# Patient Record
Sex: FEMALE | Race: WHITE | NOT HISPANIC OR LATINO | Employment: OTHER | URBAN - METROPOLITAN AREA
[De-identification: names, ages, dates, MRNs, and addresses within clinical notes are randomized per-mention and may not be internally consistent; named-entity substitution may affect disease eponyms.]

---

## 2022-01-27 ENCOUNTER — HOSPITAL ENCOUNTER (OUTPATIENT)
Facility: HOSPITAL | Age: 67
Setting detail: OBSERVATION
Discharge: HOME | End: 2022-01-29
Attending: EMERGENCY MEDICINE | Admitting: HOSPITALIST

## 2022-01-27 ENCOUNTER — OFFICE VISIT (OUTPATIENT)
Dept: FAMILY MEDICINE CLINIC | Facility: CLINIC | Age: 67
End: 2022-01-27

## 2022-01-27 VITALS
BODY MASS INDEX: 31.39 KG/M2 | TEMPERATURE: 97.3 F | DIASTOLIC BLOOD PRESSURE: 64 MMHG | HEIGHT: 67 IN | SYSTOLIC BLOOD PRESSURE: 86 MMHG | OXYGEN SATURATION: 99 % | HEART RATE: 92 BPM

## 2022-01-27 DIAGNOSIS — I95.9 HYPOTENSION, UNSPECIFIED HYPOTENSION TYPE: Primary | ICD-10-CM

## 2022-01-27 DIAGNOSIS — E83.51 HYPOCALCEMIA: ICD-10-CM

## 2022-01-27 DIAGNOSIS — I26.94 MULTIPLE SUBSEGMENTAL PULMONARY EMBOLI WITHOUT ACUTE COR PULMONALE: ICD-10-CM

## 2022-01-27 DIAGNOSIS — R53.83 FATIGUE, UNSPECIFIED TYPE: ICD-10-CM

## 2022-01-27 DIAGNOSIS — E87.6 HYPOKALEMIA: Primary | ICD-10-CM

## 2022-01-27 DIAGNOSIS — R73.9 ELEVATED BLOOD SUGAR: ICD-10-CM

## 2022-01-27 PROBLEM — Z91.89 HISTORY OF ANXIETY STATE: Status: ACTIVE | Noted: 2019-04-29

## 2022-01-27 PROBLEM — E66.9 OBESITY (BMI 30-39.9): Status: ACTIVE | Noted: 2022-01-27

## 2022-01-27 PROBLEM — E66.3 OVERWEIGHT WITH BODY MASS INDEX (BMI) 25.0-29.9: Status: ACTIVE | Noted: 2019-06-27

## 2022-01-27 PROBLEM — J45.909 ASTHMA: Status: ACTIVE | Noted: 2022-01-27

## 2022-01-27 PROBLEM — F03.90 DEMENTIA: Status: ACTIVE | Noted: 2020-08-20

## 2022-01-27 PROBLEM — F41.8 MIXED ANXIETY AND DEPRESSIVE DISORDER: Status: ACTIVE | Noted: 2018-12-03

## 2022-01-27 PROBLEM — R07.9 CHEST PAIN: Status: ACTIVE | Noted: 2022-01-27

## 2022-01-27 PROBLEM — Z86.711 HISTORY OF PULMONARY EMBOLISM: Status: ACTIVE | Noted: 2022-01-27

## 2022-01-27 PROBLEM — Z66 DNR (DO NOT RESUSCITATE): Status: ACTIVE | Noted: 2022-01-27

## 2022-01-27 PROBLEM — R41.3 MEMORY LOSS: Status: ACTIVE | Noted: 2019-04-25

## 2022-01-27 PROBLEM — Z86.59 HISTORY OF ANXIETY STATE: Status: ACTIVE | Noted: 2019-04-29

## 2022-01-27 LAB
ALBUMIN SERPL-MCNC: 1.6 G/DL (ref 3.5–5.2)
ALBUMIN/GLOB SERPL: 0.8 G/DL
ALP SERPL-CCNC: 65 U/L (ref 39–117)
ALT SERPL W P-5'-P-CCNC: 43 U/L (ref 1–33)
ANION GAP SERPL CALCULATED.3IONS-SCNC: 8.2 MMOL/L (ref 5–15)
AST SERPL-CCNC: 46 U/L (ref 1–32)
BACTERIA UR QL AUTO: ABNORMAL /HPF
BASOPHILS # BLD AUTO: 0.05 10*3/MM3 (ref 0–0.2)
BASOPHILS NFR BLD AUTO: 0.5 % (ref 0–1.5)
BILIRUB SERPL-MCNC: 0.3 MG/DL (ref 0–1.2)
BILIRUB UR QL STRIP: NEGATIVE
BUN SERPL-MCNC: 14 MG/DL (ref 8–23)
BUN/CREAT SERPL: 21.2 (ref 7–25)
CALCIUM SPEC-SCNC: 5.1 MG/DL (ref 8.6–10.5)
CHLORIDE SERPL-SCNC: 114 MMOL/L (ref 98–107)
CLARITY UR: ABNORMAL
CO2 SERPL-SCNC: 17.8 MMOL/L (ref 22–29)
COLOR UR: ABNORMAL
CREAT SERPL-MCNC: 0.66 MG/DL (ref 0.57–1)
DEPRECATED RDW RBC AUTO: 39.1 FL (ref 37–54)
EOSINOPHIL # BLD AUTO: 0.37 10*3/MM3 (ref 0–0.4)
EOSINOPHIL NFR BLD AUTO: 3.5 % (ref 0.3–6.2)
ERYTHROCYTE [DISTWIDTH] IN BLOOD BY AUTOMATED COUNT: 12.1 % (ref 12.3–15.4)
GFR SERPL CREATININE-BSD FRML MDRD: 90 ML/MIN/1.73
GLOBULIN UR ELPH-MCNC: 2 GM/DL
GLUCOSE BLDC GLUCOMTR-MCNC: 161 MG/DL (ref 70–130)
GLUCOSE SERPL-MCNC: 75 MG/DL (ref 65–99)
GLUCOSE UR STRIP-MCNC: NEGATIVE MG/DL
HCT VFR BLD AUTO: 30 % (ref 34–46.6)
HGB BLD-MCNC: 10.5 G/DL (ref 12–15.9)
HGB UR QL STRIP.AUTO: NEGATIVE
HYALINE CASTS UR QL AUTO: ABNORMAL /LPF
IMM GRANULOCYTES # BLD AUTO: 0.3 10*3/MM3 (ref 0–0.05)
IMM GRANULOCYTES NFR BLD AUTO: 2.9 % (ref 0–0.5)
KETONES UR QL STRIP: ABNORMAL
LEUKOCYTE ESTERASE UR QL STRIP.AUTO: ABNORMAL
LYMPHOCYTES # BLD AUTO: 1.01 10*3/MM3 (ref 0.7–3.1)
LYMPHOCYTES NFR BLD AUTO: 9.7 % (ref 19.6–45.3)
MCH RBC QN AUTO: 31.2 PG (ref 26.6–33)
MCHC RBC AUTO-ENTMCNC: 35 G/DL (ref 31.5–35.7)
MCV RBC AUTO: 89 FL (ref 79–97)
MONOCYTES # BLD AUTO: 0.97 10*3/MM3 (ref 0.1–0.9)
MONOCYTES NFR BLD AUTO: 9.3 % (ref 5–12)
NEUTROPHILS NFR BLD AUTO: 7.76 10*3/MM3 (ref 1.7–7)
NEUTROPHILS NFR BLD AUTO: 74.1 % (ref 42.7–76)
NITRITE UR QL STRIP: NEGATIVE
NRBC BLD AUTO-RTO: 0 /100 WBC (ref 0–0.2)
PH UR STRIP.AUTO: <=5 [PH] (ref 5–8)
PLATELET # BLD AUTO: 391 10*3/MM3 (ref 140–450)
PMV BLD AUTO: 10.3 FL (ref 6–12)
POTASSIUM SERPL-SCNC: 2.2 MMOL/L (ref 3.5–5.2)
PROT SERPL-MCNC: 3.6 G/DL (ref 6–8.5)
PROT UR QL STRIP: ABNORMAL
QT INTERVAL: 424 MS
RBC # BLD AUTO: 3.37 10*6/MM3 (ref 3.77–5.28)
RBC # UR STRIP: ABNORMAL /HPF
REF LAB TEST METHOD: ABNORMAL
SODIUM SERPL-SCNC: 140 MMOL/L (ref 136–145)
SP GR UR STRIP: 1.02 (ref 1–1.03)
SQUAMOUS #/AREA URNS HPF: ABNORMAL /HPF
TROPONIN T SERPL-MCNC: <0.01 NG/ML (ref 0–0.03)
UROBILINOGEN UR QL STRIP: ABNORMAL
WBC # UR STRIP: ABNORMAL /HPF
WBC NRBC COR # BLD: 10.46 10*3/MM3 (ref 3.4–10.8)

## 2022-01-27 PROCEDURE — 82962 GLUCOSE BLOOD TEST: CPT | Performed by: NURSE PRACTITIONER

## 2022-01-27 PROCEDURE — 93005 ELECTROCARDIOGRAM TRACING: CPT | Performed by: EMERGENCY MEDICINE

## 2022-01-27 PROCEDURE — U0004 COV-19 TEST NON-CDC HGH THRU: HCPCS | Performed by: EMERGENCY MEDICINE

## 2022-01-27 PROCEDURE — G0378 HOSPITAL OBSERVATION PER HR: HCPCS

## 2022-01-27 PROCEDURE — 99285 EMERGENCY DEPT VISIT HI MDM: CPT

## 2022-01-27 PROCEDURE — 99203 OFFICE O/P NEW LOW 30 MIN: CPT | Performed by: NURSE PRACTITIONER

## 2022-01-27 PROCEDURE — 84484 ASSAY OF TROPONIN QUANT: CPT | Performed by: EMERGENCY MEDICINE

## 2022-01-27 PROCEDURE — C9803 HOPD COVID-19 SPEC COLLECT: HCPCS

## 2022-01-27 PROCEDURE — 96365 THER/PROPH/DIAG IV INF INIT: CPT

## 2022-01-27 PROCEDURE — 93010 ELECTROCARDIOGRAM REPORT: CPT | Performed by: INTERNAL MEDICINE

## 2022-01-27 PROCEDURE — 25010000002 CALCIUM GLUCONATE-NACL 1-0.675 GM/50ML-% SOLUTION: Performed by: EMERGENCY MEDICINE

## 2022-01-27 PROCEDURE — 81001 URINALYSIS AUTO W/SCOPE: CPT | Performed by: EMERGENCY MEDICINE

## 2022-01-27 PROCEDURE — 80053 COMPREHEN METABOLIC PANEL: CPT | Performed by: EMERGENCY MEDICINE

## 2022-01-27 PROCEDURE — 85025 COMPLETE CBC W/AUTO DIFF WBC: CPT | Performed by: EMERGENCY MEDICINE

## 2022-01-27 RX ORDER — ACETAMINOPHEN 650 MG/1
650 SUPPOSITORY RECTAL EVERY 4 HOURS PRN
Status: DISCONTINUED | OUTPATIENT
Start: 2022-01-27 | End: 2022-01-29 | Stop reason: HOSPADM

## 2022-01-27 RX ORDER — ONDANSETRON 4 MG/1
4 TABLET, FILM COATED ORAL EVERY 6 HOURS PRN
Status: DISCONTINUED | OUTPATIENT
Start: 2022-01-27 | End: 2022-01-29 | Stop reason: HOSPADM

## 2022-01-27 RX ORDER — MEMANTINE HYDROCHLORIDE 10 MG/1
10 TABLET ORAL 2 TIMES DAILY
COMMUNITY
Start: 2022-01-24 | End: 2022-05-03 | Stop reason: SDUPTHER

## 2022-01-27 RX ORDER — BENZONATATE 100 MG/1
200 CAPSULE ORAL 3 TIMES DAILY PRN
COMMUNITY
Start: 2022-01-20 | End: 2022-02-07

## 2022-01-27 RX ORDER — SODIUM CHLORIDE 0.9 % (FLUSH) 0.9 %
10 SYRINGE (ML) INJECTION AS NEEDED
Status: DISCONTINUED | OUTPATIENT
Start: 2022-01-27 | End: 2022-01-29 | Stop reason: HOSPADM

## 2022-01-27 RX ORDER — MAGNESIUM SULFATE HEPTAHYDRATE 40 MG/ML
2 INJECTION, SOLUTION INTRAVENOUS AS NEEDED
Status: DISCONTINUED | OUTPATIENT
Start: 2022-01-27 | End: 2022-01-29 | Stop reason: HOSPADM

## 2022-01-27 RX ORDER — POTASSIUM CHLORIDE 1.5 G/1.77G
40 POWDER, FOR SOLUTION ORAL AS NEEDED
Status: DISCONTINUED | OUTPATIENT
Start: 2022-01-27 | End: 2022-01-29 | Stop reason: HOSPADM

## 2022-01-27 RX ORDER — DONEPEZIL HYDROCHLORIDE 10 MG/1
10 TABLET, FILM COATED ORAL NIGHTLY
COMMUNITY
Start: 2021-12-27

## 2022-01-27 RX ORDER — MAGNESIUM SULFATE HEPTAHYDRATE 40 MG/ML
4 INJECTION, SOLUTION INTRAVENOUS AS NEEDED
Status: DISCONTINUED | OUTPATIENT
Start: 2022-01-27 | End: 2022-01-29 | Stop reason: HOSPADM

## 2022-01-27 RX ORDER — NITROGLYCERIN 0.4 MG/1
0.4 TABLET SUBLINGUAL
Status: DISCONTINUED | OUTPATIENT
Start: 2022-01-27 | End: 2022-01-29 | Stop reason: HOSPADM

## 2022-01-27 RX ORDER — SODIUM CHLORIDE 0.9 % (FLUSH) 0.9 %
10 SYRINGE (ML) INJECTION EVERY 12 HOURS SCHEDULED
Status: DISCONTINUED | OUTPATIENT
Start: 2022-01-27 | End: 2022-01-29 | Stop reason: HOSPADM

## 2022-01-27 RX ORDER — POTASSIUM CHLORIDE 7.45 MG/ML
10 INJECTION INTRAVENOUS
Status: DISCONTINUED | OUTPATIENT
Start: 2022-01-27 | End: 2022-01-29 | Stop reason: HOSPADM

## 2022-01-27 RX ORDER — ONDANSETRON 2 MG/ML
4 INJECTION INTRAMUSCULAR; INTRAVENOUS EVERY 6 HOURS PRN
Status: DISCONTINUED | OUTPATIENT
Start: 2022-01-27 | End: 2022-01-29 | Stop reason: HOSPADM

## 2022-01-27 RX ORDER — ACETAMINOPHEN 325 MG/1
650 TABLET ORAL EVERY 4 HOURS PRN
Status: DISCONTINUED | OUTPATIENT
Start: 2022-01-27 | End: 2022-01-29 | Stop reason: HOSPADM

## 2022-01-27 RX ORDER — ACETAMINOPHEN 160 MG/5ML
650 SOLUTION ORAL EVERY 4 HOURS PRN
Status: DISCONTINUED | OUTPATIENT
Start: 2022-01-27 | End: 2022-01-29 | Stop reason: HOSPADM

## 2022-01-27 RX ORDER — FUROSEMIDE 40 MG/1
40 TABLET ORAL DAILY PRN
COMMUNITY
Start: 2021-12-27 | End: 2022-01-29 | Stop reason: HOSPADM

## 2022-01-27 RX ORDER — FENOFIBRATE 160 MG/1
1 TABLET ORAL DAILY
COMMUNITY

## 2022-01-27 RX ORDER — POTASSIUM CHLORIDE 750 MG/1
40 TABLET, FILM COATED, EXTENDED RELEASE ORAL AS NEEDED
Status: DISCONTINUED | OUTPATIENT
Start: 2022-01-27 | End: 2022-01-29 | Stop reason: HOSPADM

## 2022-01-27 RX ORDER — CALCIUM GLUCONATE 20 MG/ML
1 INJECTION, SOLUTION INTRAVENOUS AS NEEDED
Status: DISCONTINUED | OUTPATIENT
Start: 2022-01-27 | End: 2022-01-29 | Stop reason: HOSPADM

## 2022-01-27 RX ORDER — GUAIFENESIN 600 MG/1
1200 TABLET, EXTENDED RELEASE ORAL
COMMUNITY
Start: 2022-01-20 | End: 2022-02-07

## 2022-01-27 RX ADMIN — POTASSIUM CHLORIDE 40 MEQ: 750 TABLET, EXTENDED RELEASE ORAL at 23:53

## 2022-01-27 RX ADMIN — POTASSIUM CHLORIDE 40 MEQ: 750 TABLET, EXTENDED RELEASE ORAL at 18:46

## 2022-01-27 RX ADMIN — CALCIUM GLUCONATE 1 G: 20 INJECTION, SOLUTION INTRAVENOUS at 18:50

## 2022-01-27 RX ADMIN — SODIUM CHLORIDE 500 ML: 9 INJECTION, SOLUTION INTRAVENOUS at 16:55

## 2022-01-27 RX ADMIN — SODIUM CHLORIDE, PRESERVATIVE FREE 10 ML: 5 INJECTION INTRAVENOUS at 23:54

## 2022-01-27 RX ADMIN — APIXABAN 5 MG: 5 TABLET, FILM COATED ORAL at 23:53

## 2022-01-27 NOTE — PROGRESS NOTES
"Chief Complaint  Establish Care and Fatigue    Subjective          Erin Jones presents to [facility]  History of Present Illness  This is a 66-year-old female patient here today to establish care.  Patient was recently hospitalized for bilateral pulmonary emboli and DVT of the right popliteal vein.  She was started on Eliquis and has an appointment with Dr. Simmons in [place] next week.  Her sister also suffers from blood clot . Echo showed EF of 55 to 60%. She will follow up with Dr Menezes. She has a history of asthma and dementia. She is on aricept and namenda. She is currently being followed by  neurology. Her  is with her today and states since returning home from hospital she is become more lethargic each day.  She does have a mild cough.  She denies any chest pain or shortness of breath.  She is very weak and lightheaded.  Patient's blood pressure today is 86/64.    Objective   Vital Signs:   BP (!) 86/64   Pulse 92   Temp 97.3 °F (36.3 °C)   Ht 170 cm (66.93\")   SpO2 99%   BMI 31.39 kg/m²     Physical Exam  Vitals and nursing note reviewed.   HENT:      Head: Normocephalic.      Nose: Nose normal.   Eyes:      Pupils: Pupils are equal, round, and reactive to light.   Cardiovascular:      Rate and Rhythm: Normal rate and regular rhythm.      Pulses: Normal pulses.      Heart sounds: Normal heart sounds.   Pulmonary:      Effort: Pulmonary effort is normal. No respiratory distress.      Breath sounds: Normal breath sounds. No wheezing or rales.   Abdominal:      General: Bowel sounds are normal. There is no distension.      Tenderness: There is no abdominal tenderness.   Musculoskeletal:         General: No swelling.      Cervical back: Neck supple.      Right lower leg: No edema.      Left lower leg: No edema.   Skin:     General: Skin is warm and dry.   Neurological:      Mental Status: She is alert and oriented to person, place, and time.   Psychiatric:    "      Mood and Affect: Mood normal.        Result Review :                 Assessment and Plan    Diagnoses and all orders for this visit:    1. Hypotension, unspecified hypotension type (Primary)    2. Fatigue, unspecified type  -     POCT Glucose    3. Elevated blood sugar  -     POCT Glucose    4. Multiple subsegmental pulmonary emboli without acute cor pulmonale (HCC)    Due to patient's presentation and blood pressure I will send her to ER for further evaluation.  Patient's  verbalizes understanding and will have her return once more stable at home      Follow Up   No follow-ups on file.  Patient was given instructions and counseling regarding her condition or for health maintenance advice. Please see specific information pulled into the AVS if appropriate.

## 2022-01-28 LAB
ANION GAP SERPL CALCULATED.3IONS-SCNC: 8.9 MMOL/L (ref 5–15)
BASOPHILS # BLD AUTO: 0.11 10*3/MM3 (ref 0–0.2)
BASOPHILS NFR BLD AUTO: 1 % (ref 0–1.5)
BUN SERPL-MCNC: 16 MG/DL (ref 8–23)
BUN/CREAT SERPL: 13.1 (ref 7–25)
CALCIUM SPEC-SCNC: 10 MG/DL (ref 8.6–10.5)
CALCIUM SPEC-SCNC: 9 MG/DL (ref 8.6–10.5)
CHLORIDE SERPL-SCNC: 101 MMOL/L (ref 98–107)
CO2 SERPL-SCNC: 25.1 MMOL/L (ref 22–29)
CREAT SERPL-MCNC: 1.22 MG/DL (ref 0.57–1)
DEPRECATED RDW RBC AUTO: 39.1 FL (ref 37–54)
EOSINOPHIL # BLD AUTO: 0.88 10*3/MM3 (ref 0–0.4)
EOSINOPHIL NFR BLD AUTO: 7.7 % (ref 0.3–6.2)
ERYTHROCYTE [DISTWIDTH] IN BLOOD BY AUTOMATED COUNT: 12.3 % (ref 12.3–15.4)
GFR SERPL CREATININE-BSD FRML MDRD: 44 ML/MIN/1.73
GLUCOSE SERPL-MCNC: 99 MG/DL (ref 65–99)
HCT VFR BLD AUTO: 35 % (ref 34–46.6)
HGB BLD-MCNC: 12.3 G/DL (ref 12–15.9)
IMM GRANULOCYTES # BLD AUTO: 0.4 10*3/MM3 (ref 0–0.05)
IMM GRANULOCYTES NFR BLD AUTO: 3.5 % (ref 0–0.5)
LYMPHOCYTES # BLD AUTO: 1.8 10*3/MM3 (ref 0.7–3.1)
LYMPHOCYTES NFR BLD AUTO: 15.8 % (ref 19.6–45.3)
MAGNESIUM SERPL-MCNC: 2.4 MG/DL (ref 1.6–2.4)
MCH RBC QN AUTO: 31 PG (ref 26.6–33)
MCHC RBC AUTO-ENTMCNC: 35.1 G/DL (ref 31.5–35.7)
MCV RBC AUTO: 88.2 FL (ref 79–97)
MONOCYTES # BLD AUTO: 1.05 10*3/MM3 (ref 0.1–0.9)
MONOCYTES NFR BLD AUTO: 9.2 % (ref 5–12)
NEUTROPHILS NFR BLD AUTO: 62.8 % (ref 42.7–76)
NEUTROPHILS NFR BLD AUTO: 7.15 10*3/MM3 (ref 1.7–7)
NRBC BLD AUTO-RTO: 0.1 /100 WBC (ref 0–0.2)
PLATELET # BLD AUTO: 440 10*3/MM3 (ref 140–450)
PMV BLD AUTO: 10.6 FL (ref 6–12)
POTASSIUM SERPL-SCNC: 5.2 MMOL/L (ref 3.5–5.2)
RBC # BLD AUTO: 3.97 10*6/MM3 (ref 3.77–5.28)
SARS-COV-2 ORF1AB RESP QL NAA+PROBE: NOT DETECTED
SODIUM SERPL-SCNC: 135 MMOL/L (ref 136–145)
WBC NRBC COR # BLD: 11.39 10*3/MM3 (ref 3.4–10.8)

## 2022-01-28 PROCEDURE — 83735 ASSAY OF MAGNESIUM: CPT | Performed by: EMERGENCY MEDICINE

## 2022-01-28 PROCEDURE — G0378 HOSPITAL OBSERVATION PER HR: HCPCS

## 2022-01-28 PROCEDURE — 85025 COMPLETE CBC W/AUTO DIFF WBC: CPT | Performed by: INTERNAL MEDICINE

## 2022-01-28 PROCEDURE — 97110 THERAPEUTIC EXERCISES: CPT

## 2022-01-28 PROCEDURE — 80048 BASIC METABOLIC PNL TOTAL CA: CPT | Performed by: INTERNAL MEDICINE

## 2022-01-28 PROCEDURE — 25010000002 CALCIUM GLUCONATE PER 10 ML: Performed by: EMERGENCY MEDICINE

## 2022-01-28 PROCEDURE — 36415 COLL VENOUS BLD VENIPUNCTURE: CPT | Performed by: INTERNAL MEDICINE

## 2022-01-28 PROCEDURE — 82310 ASSAY OF CALCIUM: CPT | Performed by: HOSPITALIST

## 2022-01-28 PROCEDURE — 97161 PT EVAL LOW COMPLEX 20 MIN: CPT

## 2022-01-28 RX ADMIN — APIXABAN 5 MG: 5 TABLET, FILM COATED ORAL at 09:08

## 2022-01-28 RX ADMIN — APIXABAN 5 MG: 5 TABLET, FILM COATED ORAL at 20:19

## 2022-01-28 RX ADMIN — CALCIUM GLUCONATE 6 G: 98 INJECTION, SOLUTION INTRAVENOUS at 06:24

## 2022-01-28 RX ADMIN — SODIUM CHLORIDE, PRESERVATIVE FREE 10 ML: 5 INJECTION INTRAVENOUS at 09:09

## 2022-01-28 RX ADMIN — POTASSIUM CHLORIDE 40 MEQ: 750 TABLET, EXTENDED RELEASE ORAL at 04:06

## 2022-01-28 RX ADMIN — SODIUM CHLORIDE, PRESERVATIVE FREE 10 ML: 5 INJECTION INTRAVENOUS at 20:19

## 2022-01-29 ENCOUNTER — READMISSION MANAGEMENT (OUTPATIENT)
Dept: CALL CENTER | Facility: HOSPITAL | Age: 67
End: 2022-01-29

## 2022-01-29 VITALS
BODY MASS INDEX: 30.31 KG/M2 | SYSTOLIC BLOOD PRESSURE: 122 MMHG | DIASTOLIC BLOOD PRESSURE: 76 MMHG | TEMPERATURE: 98.2 F | RESPIRATION RATE: 18 BRPM | HEART RATE: 76 BPM | OXYGEN SATURATION: 99 % | HEIGHT: 66 IN | WEIGHT: 188.6 LBS

## 2022-01-29 LAB
ALBUMIN SERPL-MCNC: 2.7 G/DL (ref 3.5–5.2)
ALBUMIN/GLOB SERPL: 0.9 G/DL
ALP SERPL-CCNC: 106 U/L (ref 39–117)
ALT SERPL W P-5'-P-CCNC: 93 U/L (ref 1–33)
ANION GAP SERPL CALCULATED.3IONS-SCNC: 9.4 MMOL/L (ref 5–15)
AST SERPL-CCNC: 75 U/L (ref 1–32)
BILIRUB SERPL-MCNC: 0.4 MG/DL (ref 0–1.2)
BUN SERPL-MCNC: 14 MG/DL (ref 8–23)
BUN/CREAT SERPL: 13.5 (ref 7–25)
CA-I BLD-MCNC: 5.2 MG/DL (ref 4.6–5.4)
CA-I SERPL ISE-MCNC: 1.31 MMOL/L (ref 1.15–1.35)
CALCIUM SPEC-SCNC: 8.8 MG/DL (ref 8.6–10.5)
CHLORIDE SERPL-SCNC: 100 MMOL/L (ref 98–107)
CO2 SERPL-SCNC: 26.6 MMOL/L (ref 22–29)
CREAT SERPL-MCNC: 1.04 MG/DL (ref 0.57–1)
GFR SERPL CREATININE-BSD FRML MDRD: 53 ML/MIN/1.73
GLOBULIN UR ELPH-MCNC: 3 GM/DL
GLUCOSE SERPL-MCNC: 98 MG/DL (ref 65–99)
MAGNESIUM SERPL-MCNC: 2.2 MG/DL (ref 1.6–2.4)
PHOSPHATE SERPL-MCNC: 3.5 MG/DL (ref 2.5–4.5)
POTASSIUM SERPL-SCNC: 4.3 MMOL/L (ref 3.5–5.2)
PREALB SERPL-MCNC: 7.2 MG/DL (ref 20–40)
PROT SERPL-MCNC: 5.7 G/DL (ref 6–8.5)
SODIUM SERPL-SCNC: 136 MMOL/L (ref 136–145)
TSH SERPL DL<=0.05 MIU/L-ACNC: 2.91 UIU/ML (ref 0.27–4.2)

## 2022-01-29 PROCEDURE — 84443 ASSAY THYROID STIM HORMONE: CPT | Performed by: HOSPITALIST

## 2022-01-29 PROCEDURE — 90686 IIV4 VACC NO PRSV 0.5 ML IM: CPT | Performed by: INTERNAL MEDICINE

## 2022-01-29 PROCEDURE — 84100 ASSAY OF PHOSPHORUS: CPT | Performed by: HOSPITALIST

## 2022-01-29 PROCEDURE — 25010000002 INFLUENZA VAC SPLIT QUAD 0.5 ML SUSPENSION PREFILLED SYRINGE: Performed by: INTERNAL MEDICINE

## 2022-01-29 PROCEDURE — 84134 ASSAY OF PREALBUMIN: CPT | Performed by: HOSPITALIST

## 2022-01-29 PROCEDURE — G0008 ADMIN INFLUENZA VIRUS VAC: HCPCS | Performed by: INTERNAL MEDICINE

## 2022-01-29 PROCEDURE — G0378 HOSPITAL OBSERVATION PER HR: HCPCS

## 2022-01-29 PROCEDURE — 83735 ASSAY OF MAGNESIUM: CPT | Performed by: HOSPITALIST

## 2022-01-29 PROCEDURE — 80053 COMPREHEN METABOLIC PANEL: CPT | Performed by: HOSPITALIST

## 2022-01-29 PROCEDURE — 82330 ASSAY OF CALCIUM: CPT | Performed by: HOSPITALIST

## 2022-01-29 RX ADMIN — INFLUENZA VIRUS VACCINE 0.5 ML: 15; 15; 15; 15 SUSPENSION INTRAMUSCULAR at 15:17

## 2022-01-29 RX ADMIN — APIXABAN 5 MG: 5 TABLET, FILM COATED ORAL at 08:43

## 2022-01-29 NOTE — OUTREACH NOTE
Prep Survey      Responses   [facility] facility patient discharged from? [place]   Is LACE score < 7 ? No   Emergency Room discharge w/ pulse ox? No   Eligibility [facility]   Date of Admission 01/27/22   Date of Discharge 01/29/22   Discharge Disposition Home or Self Care   Discharge diagnosis hypokalemia   Does the patient have one of the following disease processes/diagnoses(primary or secondary)? Other   Does the patient have Home health ordered? No   Is there a DME ordered? No   Prep survey completed? Yes          Viviane Lowery RN

## 2022-01-31 ENCOUNTER — TELEPHONE (OUTPATIENT)
Dept: FAMILY MEDICINE CLINIC | Facility: CLINIC | Age: 67
End: 2022-01-31

## 2022-01-31 ENCOUNTER — TRANSITIONAL CARE MANAGEMENT TELEPHONE ENCOUNTER (OUTPATIENT)
Dept: CALL CENTER | Facility: HOSPITAL | Age: 67
End: 2022-01-31

## 2022-01-31 NOTE — OUTREACH NOTE
Call Center TCM Note      Responses   [facility] patient discharged from? [place]   Does the patient have one of the following disease processes/diagnoses(primary or secondary)? Other   TCM attempt successful? Yes   Call start time 1059   Call end time 1100   Discharge diagnosis hypokalemia   Person spoke with today (if not patient) and relationship Candace, daughter   Meds reviewed with patient/caregiver? Yes   Is the patient having any side effects they believe may be caused by any medication additions or changes? No   Does the patient have all medications ordered at discharge? N/A   Is the patient taking all medications as directed (includes completed medication regime)? Yes   Does the patient have a primary care provider?  Yes   Does the patient have an appointment with their PCP within 7 days of discharge? Yes   Comments regarding PCP hospital fu appt on 1/31/22 at 2:00 PM   Has the patient kept scheduled appointments due by today? N/A   Psychosocial issues? No   Did the patient receive a copy of their discharge instructions? Yes   Nursing interventions Reviewed instructions with patient   What is the patient's perception of their health status since discharge? Improving   Is the patient/caregiver able to teach back signs and symptoms related to disease process for when to call PCP? Yes   Is the patient/caregiver able to teach back signs and symptoms related to disease process for when to call 911? Yes   Is the patient/caregiver able to teach back the hierarchy of who to call/visit for symptoms/problems? PCP, Specialist, Home health nurse, Urgent Care, ED, 911 Yes   If the patient is a current smoker, are they able to teach back resources for cessation? Not a smoker   TCM call completed? Yes   Wrap up additional comments Candace, daughter, states pt is not doing well, and is at [facility] ER. Candace reports pt had an appt with hematologist this morning, but could not go r/t being lethargic.  Hematologist office advised pt to go to ER. Candace guzman they are waiting for lab results from blood draw taken at [facility] ER.           Viviana Curiel RN    1/31/2022, 11:07 EST

## 2022-02-07 ENCOUNTER — OFFICE VISIT (OUTPATIENT)
Dept: FAMILY MEDICINE CLINIC | Facility: CLINIC | Age: 67
End: 2022-02-07

## 2022-02-07 VITALS
TEMPERATURE: 97.5 F | HEIGHT: 66 IN | HEART RATE: 78 BPM | DIASTOLIC BLOOD PRESSURE: 58 MMHG | SYSTOLIC BLOOD PRESSURE: 98 MMHG | BODY MASS INDEX: 28.45 KG/M2 | OXYGEN SATURATION: 98 % | RESPIRATION RATE: 20 BRPM | WEIGHT: 177 LBS

## 2022-02-07 DIAGNOSIS — I26.94 MULTIPLE SUBSEGMENTAL PULMONARY EMBOLI WITHOUT ACUTE COR PULMONALE: ICD-10-CM

## 2022-02-07 DIAGNOSIS — R53.1 GENERALIZED WEAKNESS: ICD-10-CM

## 2022-02-07 DIAGNOSIS — G30.9 ALZHEIMER'S DISEASE, UNSPECIFIED (CODE): ICD-10-CM

## 2022-02-07 DIAGNOSIS — R53.83 LETHARGY: Primary | ICD-10-CM

## 2022-02-07 PROCEDURE — 99214 OFFICE O/P EST MOD 30 MIN: CPT | Performed by: NURSE PRACTITIONER

## 2022-02-07 NOTE — PROGRESS NOTES
"Chief Complaint  Hospital Follow Up Visit    Subjective          Erin Jones presents to [facility]  History of Present Illness  This is a 66-year-old female patient being seen today for an additional hospital visit.  Patient was seen by me on 1/27/2022 after being diagnosed with bilateral PEs and started on Eliquis.  She was lethargic in my office with a very low blood pressure and was sent to the hospital by ambulance.  She was found to have potassium of 2.2 and low calcium.  She received replacement and improved.  She was discharged back home.  She had an appointment with Dr. Simmons following week with signs of lethargy again and was sent to the emergency room at [facility].  CT of head was normal but CT of chest showed large right pleural effusion.  She was sent home and return the following day after holding Eliquis to do a right pleurocentesis where they removed 1.2 L of fluid.  Since returning home her  and daughter reports she has slowly become more lethargic.  She does have Alzheimer's dementia and is on Namenda and Aricept.  Those medicines were held last night due to continuing lethargy.  Family reports she is night wanting to eat.  Ms. Jones is very lethargic in my office today.  All blood work was normal other than WBC of 13.2.  Blood cultures and urine was both negative.  Flu and Covid was both negative.  Patient denies any chest pain or shortness of breath today.  She does report weakness.  She denies any nausea vomiting or abdominal pain.  She is afebrile today.       Objective   Vital Signs:   BP 98/58   Pulse 78   Temp 97.5 °F (36.4 °C)   Resp 20   Ht 167 cm (65.75\")   Wt 80.3 kg (177 lb)   SpO2 98%   BMI 28.79 kg/m²     Physical Exam  Vitals and nursing note reviewed. Exam conducted with a chaperone present.   HENT:      Head: Normocephalic.      Nose: Nose normal.   Eyes:      Pupils: Pupils are equal, round, and reactive to light. "   Cardiovascular:      Rate and Rhythm: Normal rate and regular rhythm.      Pulses: Normal pulses.      Heart sounds: Normal heart sounds.   Pulmonary:      Effort: Pulmonary effort is normal. No respiratory distress.      Breath sounds: Normal breath sounds. No wheezing or rales.   Abdominal:      General: Bowel sounds are normal. There is no distension.      Tenderness: There is no abdominal tenderness.   Musculoskeletal:         General: No swelling.      Cervical back: Neck supple.      Right lower leg: No edema.      Left lower leg: No edema.   Skin:     General: Skin is warm and dry.   Neurological:      Mental Status: She is alert. She is disoriented.   Psychiatric:         Mood and Affect: Mood normal.        Result Review :                 Assessment and Plan    Diagnoses and all orders for this visit:    1. Lethargy (Primary)    2. Multiple subsegmental pulmonary emboli without acute cor pulmonale (HCC)    3. Generalized weakness    4. Alzheimer's disease, unspecified (CODE) (HCC)         Due to patient's continuing lethargy I have asked for them to return to ER for further evaluation.  Patient continues to be weak and with so many hospitalizations in the last month I do feel like she could benefit from home health.  We have discussed this and will consider this depending on plans for evaluation.  Family would also like to get neurologist in the [facility] system so we will work on getting referral for that as well.  Follow Up   No follow-ups on file.  Patient was given instructions and counseling regarding her condition or for health maintenance advice. Please see specific information pulled into the AVS if appropriate.

## 2022-02-08 ENCOUNTER — READMISSION MANAGEMENT (OUTPATIENT)
Dept: CALL CENTER | Facility: HOSPITAL | Age: 67
End: 2022-02-08

## 2022-02-08 NOTE — OUTREACH NOTE
Medical Week 2 Survey      Responses   [facility] patient discharged from? [place]   Does the patient have one of the following disease processes/diagnoses(primary or secondary)? Other   Week 2 attempt successful? No   Unsuccessful attempts Attempt 1   Discharge diagnosis hypokalemia          Cyndy Bowman RN

## 2022-02-10 ENCOUNTER — READMISSION MANAGEMENT (OUTPATIENT)
Dept: CALL CENTER | Facility: HOSPITAL | Age: 67
End: 2022-02-10

## 2022-02-10 NOTE — OUTREACH NOTE
Medical Week 2 Survey      Responses   [facility] patient discharged from? [place]   Does the patient have one of the following disease processes/diagnoses(primary or secondary)? Other   Week 2 attempt successful? Yes   Call start time 1228   Discharge diagnosis hypokalemia   Call end time 1230   Is patient permission given to speak with other caregiver? Yes   Person spoke with today (if not patient) and relationship Candace, daughter   Meds reviewed with patient/caregiver? Yes   Is the patient having any side effects they believe may be caused by any medication additions or changes? No   Does the patient have all medications ordered at discharge? Yes   Is the patient taking all medications as directed (includes completed medication regime)? Yes   Does the patient have a primary care provider?  Yes   Does the patient have an appointment with their PCP within 7 days of discharge? Yes   Has the patient kept scheduled appointments due by today? Yes   Has home health visited the patient within 72 hours of discharge? Yes   Psychosocial issues? No   Did the patient receive a copy of their discharge instructions? Yes   Nursing interventions Reviewed instructions with patient   What is the patient's perception of their health status since discharge? Improving   Is the patient/caregiver able to teach back signs and symptoms related to disease process for when to call PCP? Yes   Is the patient/caregiver able to teach back signs and symptoms related to disease process for when to call 911? Yes   Is the patient/caregiver able to teach back the hierarchy of who to call/visit for symptoms/problems? PCP, Specialist, Home health nurse, Urgent Care, ED, 911 Yes   If the patient is a current smoker, are they able to teach back resources for cessation? Not a smoker   Week 2 Call Completed? Yes   Wrap up additional comments Daughter states she is doing a lot better, She has HH now.           Polina James RN

## 2022-02-17 ENCOUNTER — READMISSION MANAGEMENT (OUTPATIENT)
Dept: CALL CENTER | Facility: HOSPITAL | Age: 67
End: 2022-02-17

## 2022-02-17 NOTE — OUTREACH NOTE
Medical Week 3 Survey      Responses   [facility] patient discharged from? [place]   Does the patient have one of the following disease processes/diagnoses(primary or secondary)? Other   Week 3 attempt successful? Yes   Call start time 1537   Call end time 1539   Discharge diagnosis hypokalemia   Is patient permission given to speak with other caregiver? Yes   List who call center can speak with MIRELLA REYES   Person spoke with today (if not patient) and relationship MIRELLA REYES- DAUGHTER    Meds reviewed with patient/caregiver? Yes   Is the patient having any side effects they believe may be caused by any medication additions or changes? No   Does the patient have all medications ordered at discharge? N/A   Is the patient taking all medications as directed (includes completed medication regime)? Yes   Does the patient have a primary care provider?  Yes   Has the patient kept scheduled appointments due by today? Yes   Has home health visited the patient within 72 hours of discharge? N/A   Psychosocial issues? No   Did the patient receive a copy of their discharge instructions? Yes   Nursing interventions Reviewed instructions with patient   What is the patient's perception of their health status since discharge? Improving   Is the patient/caregiver able to teach back signs and symptoms related to disease process for when to call PCP? Yes   Is the patient/caregiver able to teach back signs and symptoms related to disease process for when to call 911? Yes   Is the patient/caregiver able to teach back the hierarchy of who to call/visit for symptoms/problems? PCP, Specialist, Home health nurse, Urgent Care, ED, 911 Yes   Week 3 Call Completed? Yes          Violet Nair LPN

## 2022-02-22 ENCOUNTER — TELEPHONE (OUTPATIENT)
Dept: FAMILY MEDICINE CLINIC | Facility: CLINIC | Age: 67
End: 2022-02-22

## 2022-03-08 DIAGNOSIS — R05.9 COUGH: Primary | ICD-10-CM

## 2022-03-15 RX ORDER — AZITHROMYCIN 250 MG/1
TABLET, FILM COATED ORAL
Qty: 6 TABLET | Refills: 0 | Status: SHIPPED | OUTPATIENT
Start: 2022-03-15 | End: 2022-07-12

## 2022-05-03 ENCOUNTER — OFFICE VISIT (OUTPATIENT)
Dept: NEUROLOGY | Facility: CLINIC | Age: 67
End: 2022-05-03

## 2022-05-03 ENCOUNTER — LAB (OUTPATIENT)
Dept: LAB | Facility: HOSPITAL | Age: 67
End: 2022-05-03

## 2022-05-03 VITALS
HEIGHT: 65 IN | HEART RATE: 80 BPM | SYSTOLIC BLOOD PRESSURE: 139 MMHG | WEIGHT: 193.9 LBS | BODY MASS INDEX: 32.3 KG/M2 | DIASTOLIC BLOOD PRESSURE: 78 MMHG

## 2022-05-03 DIAGNOSIS — R41.3 MEMORY LOSS: Primary | ICD-10-CM

## 2022-05-03 DIAGNOSIS — R41.3 MEMORY LOSS: ICD-10-CM

## 2022-05-03 DIAGNOSIS — E83.51 HYPOCALCEMIA: ICD-10-CM

## 2022-05-03 DIAGNOSIS — E87.6 HYPOKALEMIA: ICD-10-CM

## 2022-05-03 LAB
ALBUMIN SERPL-MCNC: 4.3 G/DL (ref 3.5–5.2)
ALBUMIN/GLOB SERPL: 1.7 G/DL
ALP SERPL-CCNC: 101 U/L (ref 39–117)
ALT SERPL W P-5'-P-CCNC: 13 U/L (ref 1–33)
ANION GAP SERPL CALCULATED.3IONS-SCNC: 10 MMOL/L (ref 5–15)
AST SERPL-CCNC: 17 U/L (ref 1–32)
BILIRUB SERPL-MCNC: 0.4 MG/DL (ref 0–1.2)
BUN SERPL-MCNC: 14 MG/DL (ref 8–23)
BUN/CREAT SERPL: 9.9 (ref 7–25)
CALCIUM SPEC-SCNC: 9.5 MG/DL (ref 8.6–10.5)
CHLORIDE SERPL-SCNC: 106 MMOL/L (ref 98–107)
CO2 SERPL-SCNC: 26 MMOL/L (ref 22–29)
CREAT SERPL-MCNC: 1.41 MG/DL (ref 0.57–1)
DEPRECATED RDW RBC AUTO: 47.9 FL (ref 37–54)
EGFRCR SERPLBLD CKD-EPI 2021: 41.2 ML/MIN/1.73
ERYTHROCYTE [DISTWIDTH] IN BLOOD BY AUTOMATED COUNT: 14.1 % (ref 12.3–15.4)
FOLATE SERPL-MCNC: 6.34 NG/ML (ref 4.78–24.2)
GLOBULIN UR ELPH-MCNC: 2.6 GM/DL
GLUCOSE SERPL-MCNC: 91 MG/DL (ref 65–99)
HCT VFR BLD AUTO: 43.4 % (ref 34–46.6)
HCYS SERPL-MCNC: 23.8 UMOL/L (ref 0–15)
HGB BLD-MCNC: 14.3 G/DL (ref 12–15.9)
MAGNESIUM SERPL-MCNC: 2.4 MG/DL (ref 1.6–2.4)
MCH RBC QN AUTO: 30.6 PG (ref 26.6–33)
MCHC RBC AUTO-ENTMCNC: 32.9 G/DL (ref 31.5–35.7)
MCV RBC AUTO: 92.7 FL (ref 79–97)
PHOSPHATE SERPL-MCNC: 4 MG/DL (ref 2.5–4.5)
PLATELET # BLD AUTO: 273 10*3/MM3 (ref 140–450)
PMV BLD AUTO: 9.6 FL (ref 6–12)
POTASSIUM SERPL-SCNC: 4.2 MMOL/L (ref 3.5–5.2)
PROT SERPL-MCNC: 6.9 G/DL (ref 6–8.5)
RBC # BLD AUTO: 4.68 10*6/MM3 (ref 3.77–5.28)
SODIUM SERPL-SCNC: 142 MMOL/L (ref 136–145)
VIT B12 BLD-MCNC: 274 PG/ML (ref 211–946)
WBC NRBC COR # BLD: 7.66 10*3/MM3 (ref 3.4–10.8)

## 2022-05-03 PROCEDURE — 83735 ASSAY OF MAGNESIUM: CPT

## 2022-05-03 PROCEDURE — 85027 COMPLETE CBC AUTOMATED: CPT

## 2022-05-03 PROCEDURE — 84100 ASSAY OF PHOSPHORUS: CPT

## 2022-05-03 PROCEDURE — 82746 ASSAY OF FOLIC ACID SERUM: CPT

## 2022-05-03 PROCEDURE — 83090 ASSAY OF HOMOCYSTEINE: CPT

## 2022-05-03 PROCEDURE — 80053 COMPREHEN METABOLIC PANEL: CPT

## 2022-05-03 PROCEDURE — 99215 OFFICE O/P EST HI 40 MIN: CPT | Performed by: NURSE PRACTITIONER

## 2022-05-03 PROCEDURE — 36415 COLL VENOUS BLD VENIPUNCTURE: CPT

## 2022-05-03 PROCEDURE — 82607 VITAMIN B-12: CPT

## 2022-05-03 RX ORDER — RIVAROXABAN 20 MG/1
20 TABLET, FILM COATED ORAL DAILY
COMMUNITY
Start: 2022-04-22 | End: 2022-06-29

## 2022-05-03 RX ORDER — MEMANTINE HYDROCHLORIDE 5 MG/1
TABLET ORAL
Qty: 120 TABLET | Refills: 3 | Status: SHIPPED | OUTPATIENT
Start: 2022-05-03

## 2022-05-03 RX ORDER — MEMANTINE HYDROCHLORIDE 5 MG/1
5 TABLET ORAL 2 TIMES DAILY
Qty: 60 TABLET | Refills: 3 | Status: SHIPPED | OUTPATIENT
Start: 2022-05-03 | End: 2022-07-12 | Stop reason: SDUPTHER

## 2022-05-03 NOTE — PROGRESS NOTES
"Chief Complaint  Memory Loss    Subjective          Erin Jones presents to [facility]  States she was diagnosed with Alzheimer's disease by [facility] neurology about 5-6 years ago.  Was admitted to [facility] in Feb for pulmonary embolism.  States she has difficulty with short term memory.  Endorses repetitiveness in conversations. Denies progression in memory over the last several years.   states memory has remained the same as when she was initially diagnosed.       Objective   Vital Signs:   /78   Pulse 80   Ht 165.1 cm (65\")   Wt 88 kg (193 lb 14.4 oz)   BMI 32.27 kg/m²     Physical Exam  HENT:      Head: Normocephalic.   Pulmonary:      Effort: Pulmonary effort is normal.   Neurological:      Mental Status: She is alert and oriented to person, place, and time.      Cranial Nerves: Cranial nerves are intact.      Sensory: Sensation is intact.      Motor: Motor function is intact.      Coordination: Coordination is intact.      Deep Tendon Reflexes: Reflexes are normal and symmetric.        Neurologic Exam     Mental Status   Oriented to person, place, and time.        Result Review :            MoCa 21/30      CT Head [place]: Normal     Assessment and Plan    Diagnoses and all orders for this visit:    1. Memory loss (Primary)  Assessment & Plan:  Will request records from Dr. Harris at [facility], as well as previous imaging.  Will restart memantine for memory.  Will order labs to further evaluate memory.  Discussed that alzheimer's disease is typically progressive and she has not had any progression per the  in the last 5 years.  I am not confident in that diagnosis at this point.  Will obtain records and will order repeat MRI brain.     Orders:  -     CBC (No Diff); Future  -     Comprehensive Metabolic Panel; Future  -     Vitamin B12 & Folate; Future  -     Homocysteine, serum; Future  -     MRI Brain With & Without Contrast; Future    Other " orders  -     memantine (NAMENDA) 5 MG tablet; 1 tab daily for 1 week, then 1 tab twice daily for 1 week, then 2 tabs in the morning and 1 tab at night for 1 week, then 2 tabs twice daily  Dispense: 120 tablet; Refill: 3  -     memantine (NAMENDA) 5 MG tablet; Take 1 tablet by mouth 2 (Two) Times a Day.  Dispense: 60 tablet; Refill: 3    I spent 60 minutes caring for Erin on this date of service. This time includes time spent by me in the following activities:preparing for the visit, reviewing tests, obtaining and/or reviewing a separately obtained history, performing a medically appropriate examination and/or evaluation , counseling and educating the patient/family/caregiver, ordering medications, tests, or procedures, documenting information in the medical record and independently interpreting results and communicating that information with the patient/family/caregiver  Follow Up   Return in about 2 months (around 7/3/2022) for memory f/u.  Patient was given instructions and counseling regarding her condition or for health maintenance advice. Please see specific information pulled into the AVS if appropriate.

## 2022-05-04 RX ORDER — FOLIC ACID 1 MG/1
1 TABLET ORAL DAILY
Qty: 30 TABLET | Refills: 3 | Status: SHIPPED | OUTPATIENT
Start: 2022-05-04 | End: 2022-07-12 | Stop reason: SDUPTHER

## 2022-05-04 RX ORDER — MAGNESIUM 200 MG
1000 TABLET ORAL DAILY
Qty: 30 EACH | Refills: 3 | Status: SHIPPED | OUTPATIENT
Start: 2022-05-04 | End: 2022-07-12 | Stop reason: SDUPTHER

## 2022-05-04 RX ORDER — MULTIVITAMIN WITH IRON
100 TABLET ORAL DAILY
Qty: 30 TABLET | Refills: 3 | Status: SHIPPED | OUTPATIENT
Start: 2022-05-04 | End: 2022-07-12 | Stop reason: SDUPTHER

## 2022-05-04 NOTE — PROGRESS NOTES
Please notify patient that homocysteine was elevated.  I am calling in B12, B6 and Folic acid supplements to help decrease homocysteine level.

## 2022-05-05 NOTE — ASSESSMENT & PLAN NOTE
Will request records from Dr. Harris at [facility], as well as previous imaging.  Will restart memantine for memory.  Will order labs to further evaluate memory.  Discussed that alzheimer's disease is typically progressive and she has not had any progression per the  in the last 5 years.  I am not confident in that diagnosis at this point.  Will obtain records and will order repeat MRI brain.

## 2022-05-25 ENCOUNTER — PRIOR AUTHORIZATION (OUTPATIENT)
Dept: NEUROLOGY | Facility: CLINIC | Age: 67
End: 2022-05-25

## 2022-06-03 ENCOUNTER — APPOINTMENT (OUTPATIENT)
Dept: MRI IMAGING | Facility: HOSPITAL | Age: 67
End: 2022-06-03

## 2022-06-03 ENCOUNTER — HOSPITAL ENCOUNTER (OUTPATIENT)
Dept: MRI IMAGING | Facility: HOSPITAL | Age: 67
Discharge: HOME | End: 2022-06-03
Admitting: NURSE PRACTITIONER

## 2022-06-03 DIAGNOSIS — R41.3 MEMORY LOSS: ICD-10-CM

## 2022-06-03 PROCEDURE — 0 GADOBENATE DIMEGLUMINE 529 MG/ML SOLUTION: Performed by: NURSE PRACTITIONER

## 2022-06-03 PROCEDURE — A9577 INJ MULTIHANCE: HCPCS | Performed by: NURSE PRACTITIONER

## 2022-06-03 PROCEDURE — 70553 MRI BRAIN STEM W/O & W/DYE: CPT

## 2022-06-03 RX ADMIN — GADOBENATE DIMEGLUMINE 17 ML: 529 INJECTION, SOLUTION INTRAVENOUS at 09:31

## 2022-06-29 RX ORDER — RIVAROXABAN 20 MG/1
TABLET, FILM COATED ORAL
Qty: 30 TABLET | Refills: 3 | Status: SHIPPED | OUTPATIENT
Start: 2022-06-29

## 2022-07-06 ENCOUNTER — OFFICE VISIT (OUTPATIENT)
Dept: FAMILY MEDICINE CLINIC | Facility: CLINIC | Age: 67
End: 2022-07-06

## 2022-07-06 VITALS
HEIGHT: 65 IN | BODY MASS INDEX: 31.89 KG/M2 | DIASTOLIC BLOOD PRESSURE: 82 MMHG | HEART RATE: 79 BPM | OXYGEN SATURATION: 98 % | WEIGHT: 191.4 LBS | TEMPERATURE: 97.4 F | SYSTOLIC BLOOD PRESSURE: 128 MMHG

## 2022-07-06 DIAGNOSIS — G30.9 ALZHEIMER'S DISEASE, UNSPECIFIED (CODE): ICD-10-CM

## 2022-07-06 DIAGNOSIS — R06.02 SHORTNESS OF BREATH: Primary | ICD-10-CM

## 2022-07-06 DIAGNOSIS — Z11.59 NEED FOR HEPATITIS C SCREENING TEST: ICD-10-CM

## 2022-07-06 DIAGNOSIS — I95.9 HYPOTENSION, UNSPECIFIED HYPOTENSION TYPE: ICD-10-CM

## 2022-07-06 DIAGNOSIS — Z13.220 SCREENING FOR HYPERLIPIDEMIA: ICD-10-CM

## 2022-07-06 DIAGNOSIS — Z13.0 SCREENING FOR DEFICIENCY ANEMIA: ICD-10-CM

## 2022-07-06 PROCEDURE — 99214 OFFICE O/P EST MOD 30 MIN: CPT | Performed by: NURSE PRACTITIONER

## 2022-07-06 NOTE — PROGRESS NOTES
"Chief Complaint  Follow-up (Patient is here to follow up regarding medications. ) and Shortness of Breath    Subjective        Erin Jones presents to [facility]  History of Present Illness  This is a 65 yo female patient here today for follow up. Patient has a history of bilateral pulmonary emboli. She is currently been evaluated by DR Simmons with hematology and remains on xareto daily. She does have history of having rt pulonary effusion where fluid was drained at [facility] and in march had rt pleural effusion. She has a hx of Alzheimers dementia and is being followed by neurology. She is here with her sister today. She reports to me that last week she felt short of breath for a couple of days and mainly on her right side. She states it then resolved and has not had any issues. She denies chest pain or shortness of breath today. No fever or chills.    Objective   Vital Signs:  /82 (BP Location: Left arm, Patient Position: Sitting, Cuff Size: Adult)   Pulse 79   Temp 97.4 °F (36.3 °C) (Infrared)   Ht 165.1 cm (65\")   Wt 86.8 kg (191 lb 6.4 oz)   SpO2 98%   BMI 31.85 kg/m²   Estimated body mass index is 31.85 kg/m² as calculated from the following:    Height as of this encounter: 165.1 cm (65\").    Weight as of this encounter: 86.8 kg (191 lb 6.4 oz).          Physical Exam  Vitals and nursing note reviewed.   HENT:      Head: Normocephalic.      Nose: Nose normal.   Eyes:      Pupils: Pupils are equal, round, and reactive to light.   Cardiovascular:      Rate and Rhythm: Normal rate and regular rhythm.      Pulses: Normal pulses.      Heart sounds: Normal heart sounds.   Pulmonary:      Effort: Pulmonary effort is normal. No respiratory distress.      Breath sounds: Normal breath sounds. No wheezing or rales.   Abdominal:      General: Bowel sounds are normal. There is no distension.      Tenderness: There is no abdominal tenderness.   Musculoskeletal:         " General: No swelling.      Cervical back: Neck supple.      Right lower leg: No edema.      Left lower leg: No edema.   Skin:     General: Skin is warm and dry.   Neurological:      Mental Status: She is alert and oriented to person, place, and time.   Psychiatric:         Mood and Affect: Mood normal.        Result Review :                Assessment and Plan   Diagnoses and all orders for this visit:    1. Shortness of breath (Primary)  -     XR Chest PA & Lateral (In Office)    2. Hypotension, unspecified hypotension type    3. Need for hepatitis C screening test  -     Hepatitis C Antibody    4. Screening for deficiency anemia  -     CBC & Differential    5. Screening for hyperlipidemia  -     Comprehensive Metabolic Panel  -     Lipid Panel    6. Alzheimer's disease, unspecified (CODE) (HCC)    Other orders  -     SCANNED - IMAGING    chest xray appears normal but will send for radiology to review  I will obtain routine labs today  She will let me know if shortness of breath returns    I spent a total of  30minutes with the patient today including face-to-face encounter, reviewing data in the system, coordination of care with the nursing staff as well as consultants, documentation and entering orders.              Needs mammo   Follow Up   No follow-ups on file.  Patient was given instructions and counseling regarding her condition or for health maintenance advice. Please see specific information pulled into the AVS if appropriate.

## 2022-07-07 LAB
ALBUMIN SERPL-MCNC: 4.8 G/DL (ref 3.8–4.8)
ALBUMIN/GLOB SERPL: 2.4 {RATIO} (ref 1.2–2.2)
ALP SERPL-CCNC: 94 IU/L (ref 44–121)
ALT SERPL-CCNC: 11 IU/L (ref 0–32)
AST SERPL-CCNC: 17 IU/L (ref 0–40)
BASOPHILS # BLD AUTO: 0.1 X10E3/UL (ref 0–0.2)
BASOPHILS NFR BLD AUTO: 1 %
BILIRUB SERPL-MCNC: 0.8 MG/DL (ref 0–1.2)
BUN SERPL-MCNC: 11 MG/DL (ref 8–27)
BUN/CREAT SERPL: 8 (ref 12–28)
CALCIUM SERPL-MCNC: 9.9 MG/DL (ref 8.7–10.3)
CHLORIDE SERPL-SCNC: 101 MMOL/L (ref 96–106)
CHOLEST SERPL-MCNC: 201 MG/DL (ref 100–199)
CO2 SERPL-SCNC: 24 MMOL/L (ref 20–29)
CREAT SERPL-MCNC: 1.44 MG/DL (ref 0.57–1)
EGFRCR SERPLBLD CKD-EPI 2021: 40 ML/MIN/1.73
EOSINOPHIL # BLD AUTO: 0.5 X10E3/UL (ref 0–0.4)
EOSINOPHIL NFR BLD AUTO: 6 %
ERYTHROCYTE [DISTWIDTH] IN BLOOD BY AUTOMATED COUNT: 13.3 % (ref 11.7–15.4)
GLOBULIN SER CALC-MCNC: 2 G/DL (ref 1.5–4.5)
GLUCOSE SERPL-MCNC: 99 MG/DL (ref 65–99)
HCT VFR BLD AUTO: 46 % (ref 34–46.6)
HCV AB S/CO SERPL IA: 0.1 S/CO RATIO (ref 0–0.9)
HDLC SERPL-MCNC: 67 MG/DL
HGB BLD-MCNC: 15.6 G/DL (ref 11.1–15.9)
IMM GRANULOCYTES # BLD AUTO: 0 X10E3/UL (ref 0–0.1)
IMM GRANULOCYTES NFR BLD AUTO: 0 %
LDLC SERPL CALC-MCNC: 119 MG/DL (ref 0–99)
LYMPHOCYTES # BLD AUTO: 1.6 X10E3/UL (ref 0.7–3.1)
LYMPHOCYTES NFR BLD AUTO: 19 %
MCH RBC QN AUTO: 31.4 PG (ref 26.6–33)
MCHC RBC AUTO-ENTMCNC: 33.9 G/DL (ref 31.5–35.7)
MCV RBC AUTO: 93 FL (ref 79–97)
MONOCYTES # BLD AUTO: 0.4 X10E3/UL (ref 0.1–0.9)
MONOCYTES NFR BLD AUTO: 5 %
NEUTROPHILS # BLD AUTO: 5.6 X10E3/UL (ref 1.4–7)
NEUTROPHILS NFR BLD AUTO: 69 %
PLATELET # BLD AUTO: 259 X10E3/UL (ref 150–450)
POTASSIUM SERPL-SCNC: 3.9 MMOL/L (ref 3.5–5.2)
PROT SERPL-MCNC: 6.8 G/DL (ref 6–8.5)
RBC # BLD AUTO: 4.97 X10E6/UL (ref 3.77–5.28)
SODIUM SERPL-SCNC: 139 MMOL/L (ref 134–144)
TRIGL SERPL-MCNC: 83 MG/DL (ref 0–149)
VLDLC SERPL CALC-MCNC: 15 MG/DL (ref 5–40)
WBC # BLD AUTO: 8.2 X10E3/UL (ref 3.4–10.8)

## 2022-07-12 ENCOUNTER — OFFICE VISIT (OUTPATIENT)
Dept: NEUROLOGY | Facility: CLINIC | Age: 67
End: 2022-07-12

## 2022-07-12 VITALS
SYSTOLIC BLOOD PRESSURE: 140 MMHG | WEIGHT: 194.6 LBS | HEART RATE: 72 BPM | BODY MASS INDEX: 32.42 KG/M2 | DIASTOLIC BLOOD PRESSURE: 79 MMHG | HEIGHT: 65 IN

## 2022-07-12 DIAGNOSIS — I67.9 CEREBROVASCULAR DISEASE: ICD-10-CM

## 2022-07-12 DIAGNOSIS — R79.89 ELEVATED SERUM HOMOCYSTEINE LEVEL: ICD-10-CM

## 2022-07-12 DIAGNOSIS — F03.90 DEMENTIA WITHOUT BEHAVIORAL DISTURBANCE, UNSPECIFIED DEMENTIA TYPE: Primary | ICD-10-CM

## 2022-07-12 PROCEDURE — 99214 OFFICE O/P EST MOD 30 MIN: CPT | Performed by: NURSE PRACTITIONER

## 2022-07-12 RX ORDER — MEMANTINE HYDROCHLORIDE 10 MG/1
10 TABLET ORAL 2 TIMES DAILY
Qty: 60 TABLET | Refills: 3 | Status: SHIPPED | OUTPATIENT
Start: 2022-07-12

## 2022-07-12 RX ORDER — FOLIC ACID 1 MG/1
1 TABLET ORAL DAILY
Qty: 30 TABLET | Refills: 3 | Status: SHIPPED | OUTPATIENT
Start: 2022-07-12 | End: 2022-12-15

## 2022-07-12 RX ORDER — MAGNESIUM 200 MG
1000 TABLET ORAL DAILY
Qty: 30 EACH | Refills: 3 | Status: SHIPPED | OUTPATIENT
Start: 2022-07-12

## 2022-07-12 RX ORDER — MULTIVITAMIN WITH IRON
100 TABLET ORAL DAILY
Qty: 30 TABLET | Refills: 3 | Status: SHIPPED | OUTPATIENT
Start: 2022-07-12

## 2022-07-12 NOTE — ASSESSMENT & PLAN NOTE
Continue xarelto.  Discussed signs and symptoms of stroke and instructed her to go to the ER immediately at the onset of those symptoms.

## 2022-07-12 NOTE — ASSESSMENT & PLAN NOTE
Significant cerebrovascular disease as well as medial temporal atrophy noted on MRI brain.  Likely a mixed etiology of dementia. Will order neurocognitive testing for further evaluation.

## 2022-07-12 NOTE — PROGRESS NOTES
"Chief Complaint  Neurologic Problem    Subjective          Erin Jones presents to [facility]  Presents to the office to discuss MRI brain results.  Previously diagnosed with Alzheimer's disease.  Continuing to endorse short term memory loss and repetitiveness.  Did start B12, B6 and folic acid supplements as recommended. Is currently on memantine 5mg BID.     Interval History:   States she was diagnosed with Alzheimer's disease by [facility] neurology about 5-6 years ago.  Was admitted to [facility] in Feb for pulmonary embolism.  States she has difficulty with short term memory.  Endorses repetitiveness in conversations. Denies progression in memory over the last several years.   states memory has remained the same as when she was initially diagnosed.       Objective   Vital Signs:   /79   Pulse 72   Ht 165.1 cm (65\")   Wt 88.3 kg (194 lb 9.6 oz)   BMI 32.38 kg/m²     Physical Exam  HENT:      Head: Normocephalic.   Pulmonary:      Effort: Pulmonary effort is normal.   Neurological:      Mental Status: She is alert and oriented to person, place, and time.      Cranial Nerves: Cranial nerves are intact.      Sensory: Sensation is intact.      Motor: Motor function is intact.      Coordination: Coordination is intact.      Deep Tendon Reflexes: Reflexes are normal and symmetric.        Neurologic Exam     Mental Status   Oriented to person, place, and time.        Result Review :   CBC:  Lab Results   Component Value Date    WBC 8.2 07/06/2022    RBC 4.97 07/06/2022    HGB 15.6 07/06/2022    HCT 46.0 07/06/2022    MCV 93 07/06/2022    MCH 31.4 07/06/2022    MCHC 33.9 07/06/2022    RDW 13.3 07/06/2022     07/06/2022     CMP:  Lab Results   Component Value Date    BUN 11 07/06/2022    CREATININE 1.44 (H) 07/06/2022    EGFRIFNONA 53 (L) 01/29/2022     07/06/2022    K 3.9 07/06/2022     07/06/2022    CALCIUM 9.9 07/06/2022    PROTENTOTREF " 6.8 07/06/2022    ALBUMIN 4.8 07/06/2022    LABGLOBREF 2.0 07/06/2022    BILITOT 0.8 07/06/2022    ALKPHOS 94 07/06/2022    AST 17 07/06/2022    ALT 11 07/06/2022     B12:   Lab Results   Component Value Date    WHODHKJN17 274 05/03/2022      FOLATE:   Lab Results   Component Value Date    FOLATE 6.34 05/03/2022     Homocysteine, Plasma (Quant)   0.0 - 15.0 umol/L 23.8 High       Total Cholesterol   100 - 199 mg/dL 201 High     Triglycerides   0 - 149 mg/dL 83    HDL Cholesterol   >39 mg/dL 67    VLDL Cholesterol Spencer   5 - 40 mg/dL 15    LDL Chol Calc (NIH)   0 - 99 mg/dL 119 High         Data reviewed: Consult[facility]        MRI Brain:   1. No acute ischemic change noted on diffusion weighted imaging.     2. Progression of FLAIR and T2 white matter changes most compatible small-vessel ischemic disease   in this age group.  Other etiologies such as demyelinating disease, sequela of chronic migraine   headaches, sequela inflammatory infectious change and mild other etiologies are considered less   likely .     3. No abnormal areas of postcontrast enhancement        Assessment and Plan    Diagnoses and all orders for this visit:    1. Dementia without behavioral disturbance, unspecified dementia type (HCC) (Primary)  Assessment & Plan:  Significant cerebrovascular disease as well as medial temporal atrophy noted on MRI brain.  Likely a mixed etiology of dementia. Will order neurocognitive testing for further evaluation.     Orders:  -     Ambulatory Referral to Neuropsychology    2. Cerebrovascular disease  Assessment & Plan:  Continue xarelto.  Discussed signs and symptoms of stroke and instructed her to go to the ER immediately at the onset of those symptoms.       3. Elevated serum homocysteine level  Assessment & Plan:  Continue B12, B6 and folic acid supplementation.       Other orders  -     memantine (NAMENDA) 10 MG tablet; Take 1 tablet by mouth 2 (Two) Times a Day.  Dispense: 60  tablet; Refill: 3  -     vitamin B-6 (PYRIDOXINE) 100 MG tablet; Take 1 tablet by mouth Daily.  Dispense: 30 tablet; Refill: 3  -     Cyanocobalamin (Vitamin B-12) 1000 MCG sublingual tablet; Place 1 tablet under the tongue Daily.  Dispense: 30 each; Refill: 3  -     folic acid (FOLVITE) 1 MG tablet; Take 1 tablet by mouth Daily.  Dispense: 30 tablet; Refill: 3      Follow Up   Return in about 4 months (around 11/12/2022) for memory f/u.  Patient was given instructions and counseling regarding her condition or for health maintenance advice. Please see specific information pulled into the AVS if appropriate.

## 2022-07-12 NOTE — PATIENT INSTRUCTIONS
Alzheimer's Disease Caregiver Guide  Alzheimer's disease is a condition that makes a person:  Forget things.  Act differently.  Have trouble paying attention and doing simple tasks.  These things get worse with time. The tips below can help you care for the person.  How to help manage lifestyle changes  Tips to help with symptoms  Be calm and patient.  Give simple, short answers to questions.  Avoid correcting the person in a negative way.  Try not to take things personally, even if the person forgets your name.  Do not argue with the person. This may make the person more upset.  Tips to lessen frustration  Make appointments and do daily tasks when the person is at his or her best.  Take your time. Simple tasks may take longer. Allow plenty of time to complete tasks.  Limit choices for the person.  Involve the person in what you are doing.  Keep things organized:  Keep a daily routine.  Organize medicines in a pillbox for each day of the week.  Keep a calendar in a central location to remind the person of meetings or other activities.  Avoid new or crowded places, if possible.  Use simple words, short sentences, and a calm voice. Only give one direction at a time.  Buy clothes and shoes that are easy to put on and take off.  Try to change the subject if the person becomes frustrated or angry.  Tips to prevent injury    Keep floors clear. Remove rugs, magazine racks, and floor lamps.  Keep hallways well-lit.  Put a handrail and non-slip mat in the bathtub or shower.  Put childproof locks on cabinets that have dangerous items in them. These items include medicine, alcohol, guns, toxic cleaning items, sharp tools, matches, and lighters.  Put locks on doors where the person cannot see or reach them. This helps keep the person from going out of the house and getting lost.  Be ready for emergencies. Keep a list of emergency phone numbers and addresses close by.  Remove car keys and lock garage doors so that the person  does not try to drive.  Bracelets may be worn that track location and identify the person as having memory problems. This should be worn at all times for safety.    Tips for the future    Discuss financial and legal planning early. People with this disease have trouble managing their money as the disease gets worse. Get help from a professional.  Talk about advance directives, safety, and daily care. Take these steps:  Create a living will and choose a power of . This is someone who can make decisions for the person with Alzheimer's disease when he or she can no longer do so.  Discuss driving safety and when to stop driving. The person's doctor can help with this.  If the person lives alone, make sure he or she is safe. Some people need extra help at home. Other people need more care at a nursing home or care center.    How to recognize changes in the person's condition  With this disease, memory problems and confusion slowly get worse. In time, the person may not know his or her friends and family members.  The disease can also cause changes in behavior and mood, such as anxiety or anger. The person may see, hear, taste, smell, or feel things that are not real (hallucinate). These changes can come on all of a sudden. They may happen in response to something such as:  Pain.  An infection.  Changes in temperature or noise.  Too much stimulation.  Feeling lost or scared.  Medicines.  Where to find support  Find out about services that can provide short-term care (respite care). These can allow you to take a break when you need it.  Join a support group near you. These groups can help you:  Learn ways to manage stress.  Share experiences with others.  Get emotional comfort and support.  Learn about caregiving as the disease gets worse.  Know what community resources are available.  Where to find more information  Alzheimer's Association: www.alz.org  Contact a doctor if:  The person has a fever.  The person has  a sudden behavior change that does not get better with calming strategies.  The person is not able to take care of himself or herself at home.  You are no longer able to care for the person.  Get help right away if:  The person has a sudden increase in confusion or new hallucinations.  The person threatens you or anyone else, including himself or herself.  Get help right away if you feel like your loved one may hurt himself or herself or others, or has thoughts about taking his or her own life. Go to your nearest emergency room or:  Call your local emergency services (911 in the [place]  Call the National Suicide Prevention Lifeline at 1-575.683.7006. This is open 24 hours a day.  Text the Crisis Text Line at 209065.  Summary  Alzheimer's disease causes a person to forget things.  A person who has this condition may have trouble doing simple tasks.  Take steps to keep the person from getting hurt. Plan for future care.  You can find support by joining a support group near you.  This information is not intended to replace advice given to you by your health care provider. Make sure you discuss any questions you have with your health care provider.  Document Revised: 04/05/2021 Document Reviewed: 04/05/2021  Elsevier Patient Education © 2021 Elsevier Inc.

## 2022-08-15 ENCOUNTER — TELEPHONE (OUTPATIENT)
Dept: FAMILY MEDICINE CLINIC | Facility: CLINIC | Age: 67
End: 2022-08-15

## 2022-08-15 NOTE — TELEPHONE ENCOUNTER
"Patient called stating she has swelling in her ankles, pain under her right breast, and a history of blood clots. Due to not having any openings today or the rest of the week, I told her next Monday would be the soonest we could get her in. I advised pt to go to urgent care or the ER and she stated \"\"Im just gonna forget about it\"   "

## 2022-08-30 RX ORDER — BUDESONIDE AND FORMOTEROL FUMARATE DIHYDRATE 80; 4.5 UG/1; UG/1
2 AEROSOL RESPIRATORY (INHALATION)
Qty: 10.2 G | Refills: 6 | Status: SHIPPED | OUTPATIENT
Start: 2022-08-30

## 2022-12-15 RX ORDER — FOLIC ACID 1 MG/1
TABLET ORAL
Qty: 30 TABLET | Refills: 3 | Status: SHIPPED | OUTPATIENT
Start: 2022-12-15

## 2023-02-13 ENCOUNTER — TELEPHONE (OUTPATIENT)
Dept: FAMILY MEDICINE CLINIC | Facility: CLINIC | Age: 68
End: 2023-02-13

## 2023-02-13 NOTE — TELEPHONE ENCOUNTER
Caller: Erin Jones    Relationship to patient: Self    Best call back number: [phone]    Patient is needing: PATIENT IS REQUESTING TO KNOW IF THERE ARE ANY SAMPLES OF budesonide-formoterol (Symbicort) 80-4.5 MCG/ACT inhaler AVAILABLE FOR HER TO . PATIENT STATES THAT PHARMACY IS NOT ABLE TO FILL THE MEDICATION UNTIL 2/22/23 DUE TO INSURANCE AND RECOMMENDED THAT REQUEST SAMPLES.

## 2023-02-14 RX ORDER — ALBUTEROL SULFATE 90 UG/1
2 AEROSOL, METERED RESPIRATORY (INHALATION) EVERY 4 HOURS PRN
Qty: 8 G | Refills: 0 | Status: SHIPPED | OUTPATIENT
Start: 2023-02-14

## 2023-02-24 ENCOUNTER — OFFICE VISIT (OUTPATIENT)
Dept: FAMILY MEDICINE CLINIC | Facility: CLINIC | Age: 68
End: 2023-02-24
Payer: MEDICARE

## 2023-02-24 VITALS
OXYGEN SATURATION: 98 % | WEIGHT: 203 LBS | DIASTOLIC BLOOD PRESSURE: 78 MMHG | RESPIRATION RATE: 16 BRPM | TEMPERATURE: 97.1 F | BODY MASS INDEX: 33.82 KG/M2 | SYSTOLIC BLOOD PRESSURE: 124 MMHG | HEART RATE: 79 BPM | HEIGHT: 65 IN

## 2023-02-24 DIAGNOSIS — F03.90 DEMENTIA WITHOUT BEHAVIORAL DISTURBANCE: ICD-10-CM

## 2023-02-24 DIAGNOSIS — Z78.0 POST-MENOPAUSAL: ICD-10-CM

## 2023-02-24 DIAGNOSIS — Z13.220 SCREENING FOR HYPERLIPIDEMIA: ICD-10-CM

## 2023-02-24 DIAGNOSIS — Z13.89 SCREENING FOR HEMATURIA OR PROTEINURIA: ICD-10-CM

## 2023-02-24 DIAGNOSIS — R79.89 ELEVATED SERUM HOMOCYSTEINE LEVEL: ICD-10-CM

## 2023-02-24 DIAGNOSIS — I67.9 CEREBROVASCULAR DISEASE: ICD-10-CM

## 2023-02-24 DIAGNOSIS — Z13.0 SCREENING FOR DEFICIENCY ANEMIA: ICD-10-CM

## 2023-02-24 DIAGNOSIS — I10 ESSENTIAL (PRIMARY) HYPERTENSION: ICD-10-CM

## 2023-02-24 DIAGNOSIS — R05.3 CHRONIC COUGH: ICD-10-CM

## 2023-02-24 DIAGNOSIS — R41.3 MEMORY LOSS: ICD-10-CM

## 2023-02-24 DIAGNOSIS — R06.02 SHORTNESS OF BREATH: Primary | ICD-10-CM

## 2023-02-24 DIAGNOSIS — Z12.31 ENCOUNTER FOR SCREENING MAMMOGRAM FOR MALIGNANT NEOPLASM OF BREAST: ICD-10-CM

## 2023-02-24 DIAGNOSIS — Z12.11 SCREENING FOR COLON CANCER: ICD-10-CM

## 2023-02-24 PROCEDURE — 90732 PPSV23 VACC 2 YRS+ SUBQ/IM: CPT | Performed by: NURSE PRACTITIONER

## 2023-02-24 PROCEDURE — G0009 ADMIN PNEUMOCOCCAL VACCINE: HCPCS | Performed by: NURSE PRACTITIONER

## 2023-02-24 PROCEDURE — 99214 OFFICE O/P EST MOD 30 MIN: CPT | Performed by: NURSE PRACTITIONER

## 2023-03-17 LAB
ALBUMIN SERPL-MCNC: 4.1 G/DL (ref 3.8–4.8)
ALBUMIN/GLOB SERPL: 1.6 {RATIO} (ref 1.2–2.2)
ALP SERPL-CCNC: 106 IU/L (ref 44–121)
ALT SERPL-CCNC: 12 IU/L (ref 0–32)
APPEARANCE UR: ABNORMAL
AST SERPL-CCNC: 16 IU/L (ref 0–40)
BACTERIA #/AREA URNS HPF: ABNORMAL /[HPF]
BASOPHILS # BLD AUTO: 0.1 X10E3/UL (ref 0–0.2)
BASOPHILS NFR BLD AUTO: 1 %
BILIRUB SERPL-MCNC: 0.5 MG/DL (ref 0–1.2)
BUN SERPL-MCNC: 16 MG/DL (ref 8–27)
BUN/CREAT SERPL: 11 (ref 12–28)
CALCIUM SERPL-MCNC: 9.4 MG/DL (ref 8.7–10.3)
CASTS URNS QL MICRO: ABNORMAL /LPF
CHLORIDE SERPL-SCNC: 104 MMOL/L (ref 96–106)
CHOLEST SERPL-MCNC: 191 MG/DL (ref 100–199)
CO2 SERPL-SCNC: 24 MMOL/L (ref 20–29)
COLOR UR: YELLOW
CREAT SERPL-MCNC: 1.42 MG/DL (ref 0.57–1)
EGFRCR SERPLBLD CKD-EPI 2021: 41 ML/MIN/1.73
EOSINOPHIL # BLD AUTO: 0.2 X10E3/UL (ref 0–0.4)
EOSINOPHIL NFR BLD AUTO: 3 %
EPI CELLS #/AREA URNS HPF: >10 /HPF (ref 0–10)
ERYTHROCYTE [DISTWIDTH] IN BLOOD BY AUTOMATED COUNT: 13 % (ref 11.7–15.4)
FOLATE SERPL-MCNC: >20 NG/ML
GLOBULIN SER CALC-MCNC: 2.6 G/DL (ref 1.5–4.5)
GLUCOSE SERPL-MCNC: 89 MG/DL (ref 70–99)
GLUCOSE UR QL STRIP: ABNORMAL
HCT VFR BLD AUTO: 42.1 % (ref 34–46.6)
HCYS SERPL-SCNC: 18.1 UMOL/L (ref 0–17.2)
HDLC SERPL-MCNC: 75 MG/DL
HGB BLD-MCNC: 14.5 G/DL (ref 11.1–15.9)
IMM GRANULOCYTES # BLD AUTO: 0.1 X10E3/UL (ref 0–0.1)
IMM GRANULOCYTES NFR BLD AUTO: 1 %
KETONES UR QL STRIP: ABNORMAL
LDLC SERPL CALC-MCNC: 103 MG/DL (ref 0–99)
LYMPHOCYTES # BLD AUTO: 1.8 X10E3/UL (ref 0.7–3.1)
LYMPHOCYTES NFR BLD AUTO: 21 %
MCH RBC QN AUTO: 30.7 PG (ref 26.6–33)
MCHC RBC AUTO-ENTMCNC: 34.4 G/DL (ref 31.5–35.7)
MCV RBC AUTO: 89 FL (ref 79–97)
MICRO URNS: ABNORMAL
MICRO URNS: ABNORMAL
MONOCYTES # BLD AUTO: 0.5 X10E3/UL (ref 0.1–0.9)
MONOCYTES NFR BLD AUTO: 5 %
NEUTROPHILS # BLD AUTO: 5.9 X10E3/UL (ref 1.4–7)
NEUTROPHILS NFR BLD AUTO: 69 %
PH UR STRIP: ABNORMAL [PH]
PLATELET # BLD AUTO: 229 X10E3/UL (ref 150–450)
POTASSIUM SERPL-SCNC: 4.1 MMOL/L (ref 3.5–5.2)
PROT SERPL-MCNC: 6.7 G/DL (ref 6–8.5)
PROT UR QL STRIP: ABNORMAL
RBC # BLD AUTO: 4.72 X10E6/UL (ref 3.77–5.28)
RBC #/AREA URNS HPF: ABNORMAL /HPF (ref 0–2)
SODIUM SERPL-SCNC: 141 MMOL/L (ref 134–144)
SP GR UR STRIP: ABNORMAL
TRIGL SERPL-MCNC: 69 MG/DL (ref 0–149)
VIT B12 SERPL-MCNC: 738 PG/ML (ref 232–1245)
VLDLC SERPL CALC-MCNC: 13 MG/DL (ref 5–40)
WBC # BLD AUTO: 8.5 X10E3/UL (ref 3.4–10.8)
WBC #/AREA URNS HPF: ABNORMAL /HPF (ref 0–5)

## 2023-05-11 RX ORDER — ALBUTEROL SULFATE 90 UG/1
AEROSOL, METERED RESPIRATORY (INHALATION)
Qty: 8.5 G | Refills: 0 | Status: SHIPPED | OUTPATIENT
Start: 2023-05-11

## 2023-05-19 RX ORDER — FOLIC ACID 1 MG/1
TABLET ORAL
Qty: 30 TABLET | Refills: 0 | Status: SHIPPED | OUTPATIENT
Start: 2023-05-19

## 2023-05-26 ENCOUNTER — HOSPITAL ENCOUNTER (OUTPATIENT)
Dept: MAMMOGRAPHY | Facility: HOSPITAL | Age: 68
Discharge: HOME | End: 2023-05-26
Payer: MEDICARE

## 2023-05-26 ENCOUNTER — HOSPITAL ENCOUNTER (OUTPATIENT)
Dept: BONE DENSITY | Facility: HOSPITAL | Age: 68
Discharge: HOME | End: 2023-05-26
Payer: MEDICARE

## 2023-05-26 DIAGNOSIS — Z12.31 ENCOUNTER FOR SCREENING MAMMOGRAM FOR MALIGNANT NEOPLASM OF BREAST: ICD-10-CM

## 2023-05-26 PROCEDURE — 77080 DXA BONE DENSITY AXIAL: CPT

## 2023-05-26 PROCEDURE — 77063 BREAST TOMOSYNTHESIS BI: CPT

## 2023-05-26 PROCEDURE — 77067 SCR MAMMO BI INCL CAD: CPT

## 2023-05-30 ENCOUNTER — TELEPHONE (OUTPATIENT)
Dept: FAMILY MEDICINE CLINIC | Facility: CLINIC | Age: 68
End: 2023-05-30

## 2023-08-09 RX ORDER — FOLIC ACID 1 MG/1
TABLET ORAL
Qty: 30 TABLET | Refills: 0 | OUTPATIENT
Start: 2023-08-09

## 2023-08-09 RX ORDER — DILTIAZEM HYDROCHLORIDE 60 MG/1
TABLET, FILM COATED ORAL
Qty: 10.2 G | Refills: 6 | Status: SHIPPED | OUTPATIENT
Start: 2023-08-09

## 2023-08-24 RX ORDER — FOLIC ACID 1 MG/1
TABLET ORAL
Qty: 30 TABLET | Refills: 0 | Status: SHIPPED | OUTPATIENT
Start: 2023-08-24

## 2023-09-12 ENCOUNTER — OFFICE VISIT (OUTPATIENT)
Dept: FAMILY MEDICINE CLINIC | Facility: CLINIC | Age: 68
End: 2023-09-12
Payer: MEDICARE

## 2023-09-12 VITALS
OXYGEN SATURATION: 98 % | TEMPERATURE: 97.1 F | BODY MASS INDEX: 34.32 KG/M2 | SYSTOLIC BLOOD PRESSURE: 118 MMHG | WEIGHT: 206 LBS | HEART RATE: 78 BPM | HEIGHT: 65 IN | DIASTOLIC BLOOD PRESSURE: 82 MMHG

## 2023-09-12 DIAGNOSIS — I67.9 CEREBROVASCULAR DISEASE: ICD-10-CM

## 2023-09-12 DIAGNOSIS — Z13.89 SCREENING FOR HEMATURIA OR PROTEINURIA: ICD-10-CM

## 2023-09-12 DIAGNOSIS — Z13.0 SCREENING FOR DEFICIENCY ANEMIA: ICD-10-CM

## 2023-09-12 DIAGNOSIS — Z23 NEED FOR SHINGLES VACCINE: ICD-10-CM

## 2023-09-12 DIAGNOSIS — T78.40XA ALLERGY, INITIAL ENCOUNTER: ICD-10-CM

## 2023-09-12 DIAGNOSIS — F03.90 DEMENTIA WITHOUT BEHAVIORAL DISTURBANCE: ICD-10-CM

## 2023-09-12 DIAGNOSIS — I10 ESSENTIAL (PRIMARY) HYPERTENSION: Primary | ICD-10-CM

## 2023-09-12 DIAGNOSIS — Z00.00 ENCOUNTER FOR ANNUAL WELLNESS EXAM IN MEDICARE PATIENT: ICD-10-CM

## 2023-09-12 DIAGNOSIS — Z13.220 SCREENING FOR HYPERLIPIDEMIA: ICD-10-CM

## 2023-09-12 RX ORDER — CETIRIZINE HYDROCHLORIDE 10 MG/1
10 TABLET ORAL DAILY
Qty: 30 TABLET | Refills: 3 | Status: SHIPPED | OUTPATIENT
Start: 2023-09-12

## 2023-09-12 NOTE — PROGRESS NOTES
"Chief Complaint  Follow-up (Patient is here for a 6 month follow up ) and Shortness of Breath (Patient states that she does have SOB every once in a while )    Subjective        Erin Jones presents to [facility]  History of Present Illness  This is a 68-year-old female patient here today to follow-up.  She does have Alzheimer's where she is being followed by neurology and has an appointment next week.  We have been dealing with some mild shortness of breath where she is been using an inhaler.  Her  is a long-term smoker and she has been referred to allergy and asthma for further testing.  She does report constant drainage and cough.  She is using her Zyrtec.  She denies any increased shortness of breath fever or chills.  She has had pulmonary embolus in the past and is currently on Xarelto.  She was evaluated hematology where she will continue to stay on Xarelto.  She does have hyperlipidemia and is on fenofibrate and Lipitor.  She is tolerating those medications well.       WELLNESS VISIT   Objective   Vital Signs:  /82   Pulse 78   Temp 97.1 °F (36.2 °C)   Ht 165.1 cm (65\")   Wt 93.4 kg (206 lb)   SpO2 98%   BMI 34.28 kg/m²   Estimated body mass index is 34.28 kg/m² as calculated from the following:    Height as of this encounter: 165.1 cm (65\").    Weight as of this encounter: 93.4 kg (206 lb).       BMI is >= 30 and <35. (Class 1 Obesity). The following options were offered after discussion;: exercise counseling/recommendations and nutrition counseling/recommendations      Physical Exam  Vitals and nursing note reviewed.   Constitutional:       Appearance: Normal appearance.   HENT:      Head: Normocephalic.      Right Ear: Tympanic membrane normal.      Left Ear: Tympanic membrane normal.      Nose: Nose normal. Congestion present.      Mouth/Throat:      Mouth: Mucous membranes are moist.      Pharynx: No oropharyngeal exudate or posterior oropharyngeal " erythema.   Eyes:      Pupils: Pupils are equal, round, and reactive to light.   Cardiovascular:      Rate and Rhythm: Normal rate and regular rhythm.      Pulses: Normal pulses.      Heart sounds: Normal heart sounds.   Pulmonary:      Effort: Pulmonary effort is normal. No respiratory distress.      Breath sounds: Normal breath sounds. No wheezing or rales.   Abdominal:      General: Bowel sounds are normal. There is no distension.      Tenderness: There is no abdominal tenderness.   Musculoskeletal:         General: No swelling.      Cervical back: Neck supple.      Right lower leg: No edema.      Left lower leg: No edema.   Lymphadenopathy:      Cervical: No cervical adenopathy.   Skin:     General: Skin is warm and dry.   Neurological:      Mental Status: She is alert and oriented to person, place, and time.   Psychiatric:         Mood and Affect: Mood normal.      Result Review :                   Assessment and Plan   Diagnoses and all orders for this visit:    1. Essential (primary) hypertension (Primary)    2. Dementia without behavioral disturbance    3. Screening for hyperlipidemia  -     Comprehensive Metabolic Panel  -     Lipid Panel    4. Screening for deficiency anemia  -     CBC & Differential    5. Cerebrovascular disease    6. Screening for hematuria or proteinuria  -     Urinalysis With Culture If Indicated - Urine, Clean Catch    7. Need for shingles vaccine  -     Shingrix Vaccine    8. Allergy, initial encounter    Other orders  -     cetirizine (zyrTEC) 10 MG tablet; Take 1 tablet by mouth Daily.  Dispense: 30 tablet; Refill: 3  -     Microscopic Examination -  -     Urine culture, Comprehensive - ,  -     Verbal Order  -     Hemoglobin A1c  -     Written Authorization    Continue her current medications.  I will give her Zyrtec for her increased drainage and she will go to allergy and asthma.  We will obtain fasting labs today  She will get her shingles vaccine here in the office  today      Follow Up   No follow-ups on file.  Patient was given instructions and counseling regarding her condition or for health maintenance advice. Please see specific information pulled into the AVS if appropriate.

## 2023-09-14 DIAGNOSIS — R73.09 ELEVATED GLUCOSE: Primary | ICD-10-CM

## 2023-09-14 LAB
ALBUMIN SERPL-MCNC: 4.3 G/DL (ref 3.9–4.9)
ALBUMIN/GLOB SERPL: 1.8 {RATIO} (ref 1.2–2.2)
ALP SERPL-CCNC: 121 IU/L (ref 44–121)
ALT SERPL-CCNC: 9 IU/L (ref 0–32)
APPEARANCE UR: ABNORMAL
AST SERPL-CCNC: 12 IU/L (ref 0–40)
BACTERIA #/AREA URNS HPF: ABNORMAL /[HPF]
BACTERIA UR CULT: NORMAL
BACTERIA UR CULT: NORMAL
BASOPHILS # BLD AUTO: 0.1 X10E3/UL (ref 0–0.2)
BASOPHILS NFR BLD AUTO: 1 %
BILIRUB SERPL-MCNC: 0.7 MG/DL (ref 0–1.2)
BILIRUB UR QL STRIP: NEGATIVE
BUN SERPL-MCNC: 10 MG/DL (ref 8–27)
BUN/CREAT SERPL: 6 (ref 12–28)
CALCIUM SERPL-MCNC: 9.8 MG/DL (ref 8.7–10.3)
CASTS URNS QL MICRO: ABNORMAL /LPF
CHLORIDE SERPL-SCNC: 103 MMOL/L (ref 96–106)
CHOLEST SERPL-MCNC: 209 MG/DL (ref 100–199)
CO2 SERPL-SCNC: 23 MMOL/L (ref 20–29)
COLOR UR: YELLOW
CREAT SERPL-MCNC: 1.8 MG/DL (ref 0.57–1)
EGFRCR SERPLBLD CKD-EPI 2021: 30 ML/MIN/1.73
EOSINOPHIL # BLD AUTO: 0.6 X10E3/UL (ref 0–0.4)
EOSINOPHIL NFR BLD AUTO: 6 %
EPI CELLS #/AREA URNS HPF: >10 /HPF (ref 0–10)
ERYTHROCYTE [DISTWIDTH] IN BLOOD BY AUTOMATED COUNT: 13.5 % (ref 11.7–15.4)
GLOBULIN SER CALC-MCNC: 2.4 G/DL (ref 1.5–4.5)
GLUCOSE SERPL-MCNC: 115 MG/DL (ref 70–99)
GLUCOSE UR QL STRIP: NEGATIVE
HCT VFR BLD AUTO: 42.4 % (ref 34–46.6)
HDLC SERPL-MCNC: 70 MG/DL
HGB BLD-MCNC: 14.6 G/DL (ref 11.1–15.9)
HGB UR QL STRIP: NEGATIVE
IMM GRANULOCYTES # BLD AUTO: 0.1 X10E3/UL (ref 0–0.1)
IMM GRANULOCYTES NFR BLD AUTO: 1 %
KETONES UR QL STRIP: ABNORMAL
LDLC SERPL CALC-MCNC: 123 MG/DL (ref 0–99)
LEUKOCYTE ESTERASE UR QL STRIP: NEGATIVE
LYMPHOCYTES # BLD AUTO: 1.9 X10E3/UL (ref 0.7–3.1)
LYMPHOCYTES NFR BLD AUTO: 20 %
Lab: NORMAL
Lab: NORMAL
MCH RBC QN AUTO: 30.7 PG (ref 26.6–33)
MCHC RBC AUTO-ENTMCNC: 34.4 G/DL (ref 31.5–35.7)
MCV RBC AUTO: 89 FL (ref 79–97)
MICRO URNS: ABNORMAL
MICRO URNS: ABNORMAL
MONOCYTES # BLD AUTO: 0.6 X10E3/UL (ref 0.1–0.9)
MONOCYTES NFR BLD AUTO: 6 %
MUCOUS THREADS URNS QL MICRO: PRESENT
NEUTROPHILS # BLD AUTO: 6.3 X10E3/UL (ref 1.4–7)
NEUTROPHILS NFR BLD AUTO: 66 %
NITRITE UR QL STRIP: NEGATIVE
PH UR STRIP: 5.5 [PH] (ref 5–7.5)
PLATELET # BLD AUTO: 272 X10E3/UL (ref 150–450)
POTASSIUM SERPL-SCNC: 5 MMOL/L (ref 3.5–5.2)
PROT SERPL-MCNC: 6.7 G/DL (ref 6–8.5)
PROT UR QL STRIP: ABNORMAL
RBC # BLD AUTO: 4.76 X10E6/UL (ref 3.77–5.28)
RBC #/AREA URNS HPF: ABNORMAL /HPF (ref 0–2)
SODIUM SERPL-SCNC: 142 MMOL/L (ref 134–144)
SP GR UR STRIP: 1.02 (ref 1–1.03)
TRIGL SERPL-MCNC: 91 MG/DL (ref 0–149)
URINALYSIS REFLEX: ABNORMAL
UROBILINOGEN UR STRIP-MCNC: 0.2 MG/DL (ref 0.2–1)
VLDLC SERPL CALC-MCNC: 16 MG/DL (ref 5–40)
WBC # BLD AUTO: 9.5 X10E3/UL (ref 3.4–10.8)
WBC #/AREA URNS HPF: ABNORMAL /HPF (ref 0–5)

## 2023-09-15 LAB
HBA1C MFR BLD: 5.5 % (ref 4.8–5.6)
WRITTEN AUTHORIZATION: NORMAL

## 2023-09-19 ENCOUNTER — OFFICE VISIT (OUTPATIENT)
Dept: NEUROLOGY | Facility: CLINIC | Age: 68
End: 2023-09-19
Payer: COMMERCIAL

## 2023-09-19 VITALS
HEART RATE: 79 BPM | HEIGHT: 65 IN | SYSTOLIC BLOOD PRESSURE: 141 MMHG | DIASTOLIC BLOOD PRESSURE: 94 MMHG | WEIGHT: 207.4 LBS | BODY MASS INDEX: 34.55 KG/M2

## 2023-09-19 DIAGNOSIS — F02.B4 MODERATE LATE ONSET ALZHEIMER'S DEMENTIA WITH ANXIETY: Primary | ICD-10-CM

## 2023-09-19 DIAGNOSIS — I67.9 CEREBROVASCULAR DISEASE: ICD-10-CM

## 2023-09-19 DIAGNOSIS — G30.1 MODERATE LATE ONSET ALZHEIMER'S DEMENTIA WITH ANXIETY: Primary | ICD-10-CM

## 2023-09-19 RX ORDER — DONEPEZIL HYDROCHLORIDE 10 MG/1
10 TABLET, FILM COATED ORAL NIGHTLY
Qty: 90 TABLET | Refills: 1 | Status: SHIPPED | OUTPATIENT
Start: 2023-09-19

## 2023-09-19 RX ORDER — MEMANTINE HYDROCHLORIDE 10 MG/1
10 TABLET ORAL 2 TIMES DAILY
Qty: 180 TABLET | Refills: 1 | Status: SHIPPED | OUTPATIENT
Start: 2023-09-19

## 2023-09-19 NOTE — PATIENT INSTRUCTIONS
Alzheimer's Disease Caregiver Guide  Alzheimer's disease is a condition that makes a person:  Forget things.  Act differently.  Have trouble paying attention and doing simple tasks.  These things get worse with time. The tips below can help you care for the person.  How to help manage lifestyle changes  Tips to help with symptoms  Be calm and patient.  Give simple, short answers to questions.  Avoid correcting the person in a negative way.  Try not to take things personally, even if the person forgets your name.  Do not argue with the person. This may make the person more upset.  Tips to lessen frustration  Make appointments and do daily tasks when the person is at his or her best.  Take your time. Simple tasks may take longer. Allow plenty of time to complete tasks.  Limit choices for the person.  Involve the person in what you are doing.  Keep things organized:  Keep a daily routine.  Organize medicines in a pillbox for each day of the week.  Keep a calendar in a central location to remind the person of meetings or other activities.  Avoid new or crowded places, if possible.  Use simple words, short sentences, and a calm voice. Only give one direction at a time.  Buy clothes and shoes that are easy to put on and take off.  Try to change the subject if the person becomes frustrated or angry.  Tips to prevent injury    Keep floors clear. Remove rugs, magazine racks, and floor lamps.  Keep hallways well-lit.  Put a handrail and non-slip mat in the bathtub or shower.  Put childproof locks on cabinets that have dangerous items in them. These items include medicine, alcohol, guns, toxic cleaning items, sharp tools, matches, and lighters.  Put locks on doors where the person cannot see or reach them. This helps keep the person from going out of the house and getting lost.  Be ready for emergencies. Keep a list of emergency phone numbers and addresses close by.  Remove car keys and lock garage doors so that the person  does not try to drive.  Bracelets may be worn that track location and identify the person as having memory problems. This should be worn at all times for safety.  Tips for the future    Discuss financial and legal planning early. People with this disease have trouble managing their money as the disease gets worse. Get help from a professional.  Talk about advance directives, safety, and daily care. Take these steps:  Create a living will and choose a power of . This is someone who can make decisions for the person with Alzheimer's disease when he or she can no longer do so.  Discuss driving safety and when to stop driving. The person's doctor can help with this.  If the person lives alone, make sure he or she is safe. Some people need extra help at home. Other people need more care at a nursing home or care center.  How to recognize changes in the person's condition  With this disease, memory problems and confusion slowly get worse. In time, the person may not know his or her friends and family members.  The disease can also cause changes in behavior and mood, such as anxiety or anger. The person may see, hear, taste, smell, or feel things that are not real (hallucinate). These changes can come on all of a sudden. They may happen in response to something such as:  Pain.  An infection.  Changes in temperature or noise.  Too much stimulation.  Feeling lost or scared.  Medicines.  Where to find support  Find out about services that can provide short-term care (respite care). These can allow you to take a break when you need it.  Join a support group near you. These groups can help you:  Learn ways to manage stress.  Share experiences with others.  Get emotional comfort and support.  Learn about caregiving as the disease gets worse.  Know what community resources are available.  Where to find more information  Alzheimer's Association: www.alz.org  Contact a doctor if:  The person has a fever.  The person has a  sudden behavior change that does not get better with calming strategies.  The person is not able to take care of himself or herself at home.  You are no longer able to care for the person.  Get help right away if:  The person has a sudden increase in confusion or new hallucinations.  The person threatens you or anyone else, including himself or herself.  Get help right away if you feel like your loved one may hurt himself or herself or others, or has thoughts about taking his or her own life. Go to your nearest emergency room or:  Call your local emergency services (911 in the [place]  Call the National Suicide Prevention Lifeline at 1-395.211.2082 or 296 in the [place] This is open 24 hours a day.  Text the Crisis Text Line at 538329.  Summary  Alzheimer's disease causes a person to forget things.  A person who has this condition may have trouble doing simple tasks.  Take steps to keep the person from getting hurt. Plan for future care.  You can find support by joining a support group near you.  This information is not intended to replace advice given to you by your health care provider. Make sure you discuss any questions you have with your health care provider.  Document Revised: 07/13/2022 Document Reviewed: 04/05/2021  Elsevier Patient Education © 2023 Elsevier Inc.

## 2023-09-19 NOTE — PROGRESS NOTES
"Chief Complaint  Memory Loss    Subjective          Erin Jones presents to [facility]  History of Present Illness  Presents to the office to discuss neurocognitive testing.  Presents with her daughter. Previously diagnosed with Alzheimer's disease.  Continuing to endorse short term memory loss and repetitiveness.        Interval History:   States she was diagnosed with Alzheimer's disease by [facility] neurology about 5-6 years ago.  Was admitted to [facility] in Feb for pulmonary embolism.  States she has difficulty with short term memory.  Endorses repetitiveness in conversations. Denies progression in memory over the last several years.   states memory has remained the same as when she was initially diagnosed.     Objective   Vital Signs:   /94   Pulse 79   Ht 165.1 cm (65\")   Wt 94.1 kg (207 lb 6.4 oz)   BMI 34.51 kg/m²     Physical Exam  HENT:      Head: Normocephalic.   Pulmonary:      Effort: Pulmonary effort is normal.   Neurological:      Mental Status: She is alert and oriented to person, place, and time.      Sensory: Sensation is intact.      Motor: Motor function is intact.      Coordination: Coordination is intact.      Deep Tendon Reflexes: Reflexes are normal and symmetric.      Neurologic Exam     Mental Status   Oriented to person, place, and time.      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   Adjustment disorder with mixed anxiety and depression     Assessment and Plan    Diagnoses and all orders for this visit:    1. Moderate late onset Alzheimer's dementia with anxiety (Primary)  Assessment & Plan:  Continue memantine and donepezil.       2. Cerebrovascular disease    Other orders  -     donepezil (ARICEPT) 10 MG tablet; Take 1 tablet by mouth Every Night.  Dispense: 90 tablet; Refill: 1  -     " memantine (NAMENDA) 10 MG tablet; Take 1 tablet by mouth 2 (Two) Times a Day.  Dispense: 180 tablet; Refill: 1        Follow Up   Return in about 6 months (around 3/19/2024) for memory f/u.  Patient was given instructions and counseling regarding her condition or for health maintenance advice. Please see specific information pulled into the AVS if appropriate.

## 2023-09-24 DIAGNOSIS — N18.32 STAGE 3B CHRONIC KIDNEY DISEASE: Primary | ICD-10-CM

## 2023-09-24 RX ORDER — ATORVASTATIN CALCIUM 10 MG/1
10 TABLET, FILM COATED ORAL DAILY
Qty: 90 TABLET | Refills: 1 | Status: SHIPPED | OUTPATIENT
Start: 2023-09-24

## 2023-09-24 NOTE — PROGRESS NOTES
Kidney function has declined and would like to refer her to kidney specialist. They is a group in [facility] so I will plan for that group. Avoid ibuprofen, advil, motrin if possible. Cholesterol is elevated. I would like for her to start a statin. Will send to pharmacy. All other labs are ok. Would like for her to return in 6 months.

## 2023-09-27 PROBLEM — G30.1 MODERATE LATE ONSET ALZHEIMER'S DEMENTIA WITH ANXIETY: Status: ACTIVE | Noted: 2020-08-20

## 2023-09-27 PROBLEM — F02.B4 MODERATE LATE ONSET ALZHEIMER'S DEMENTIA WITH ANXIETY: Status: ACTIVE | Noted: 2020-08-20

## 2023-09-28 NOTE — PROGRESS NOTES
The ABCs of the Annual Wellness Visit  Subsequent Medicare Wellness Visit    Subjective      Erin Jones is a 68 y.o. female who presents for a Subsequent Medicare Wellness Visit.    The following portions of the patient's history were reviewed and   updated as appropriate: allergies, current medications, past family history, past medical history, past social history, past surgical history, and problem list.    Compared to one year ago, the patient feels her physical   health is the same.    Compared to one year ago, the patient feels her mental   health is the same.    Recent Hospitalizations:  She was not admitted to the hospital during the last year.       Current Medical Providers:  Patient Care Team:  Laurie De La Rosa APRN as PCP - General (Family Medicine)    Outpatient Medications Prior to Visit   Medication Sig Dispense Refill    albuterol sulfate  (90 Base) MCG/ACT inhaler inhale TWO puffs BY MOUTH EVERY 4 HOURS AS NEEDED FOR WHEEZING 8.5 g 0    Aspirin Buf,CaCarb-MgCarb-MgO, 81 MG tablet Take  by mouth.      Cyanocobalamin (Vitamin B-12) 1000 MCG sublingual tablet Place 1 tablet under the tongue Daily. 30 each 3    fenofibrate 160 MG tablet Take 1 tablet by mouth Daily.      folic acid (FOLVITE) 1 MG tablet TAKE ONE TABLET BY MOUTH DAILY 30 tablet 0    Symbicort 80-4.5 MCG/ACT inhaler INHALE TWO puffs TWICE DAILY 10.2 g 6    vitamin B-6 (PYRIDOXINE) 100 MG tablet Take 1 tablet by mouth Daily. 30 tablet 3    Xarelto 20 MG tablet TAKE ONE TABLET BY MOUTH EVERY DAY 30 tablet 3    donepezil (ARICEPT) 10 MG tablet Take 1 tablet by mouth Every Night.      memantine (NAMENDA) 10 MG tablet Take 1 tablet by mouth 2 (Two) Times a Day. 60 tablet 3    memantine (NAMENDA) 5 MG tablet 1 tab daily for 1 week, then 1 tab twice daily for 1 week, then 2 tabs in the morning and 1 tab at night for 1 week, then 2 tabs twice daily (Patient not taking: Reported on 9/19/2023) 120 tablet 3     No  "facility-administered medications prior to visit.       No opioid medication identified on active medication list. I have reviewed chart for other potential  high risk medication/s and harmful drug interactions in the elderly.        Aspirin is on active medication list. Aspirin use is not indicated based on review of current medical condition/s. Risk of harm outweighs potential benefits. Patient instructed to discontinue this medication.  .      Patient Active Problem List   Diagnosis    Moderate late onset Alzheimer's dementia with anxiety    Asthma    Chest pain    History of anxiety state    Hyperlipidemia    Hypertension    Memory loss    Mixed anxiety and depressive disorder    Stage 3 chronic kidney disease    Overweight with body mass index (BMI) 25.0-29.9    Hypokalemia    Hypocalcemia    Obesity (BMI 30-39.9)    DNR (do not resuscitate)    History of pulmonary embolism    Cerebrovascular disease    Elevated serum homocysteine level     Advance Care Planning   Advance Care Planning     Advance Directive is not on file.  ACP discussion was held with the patient during this visit. Patient does not have an advance directive, declines further assistance.     Objective    Vitals:    09/12/23 1455   BP: 118/82   Pulse: 78   Temp: 97.1 °F (36.2 °C)   SpO2: 98%   Weight: 93.4 kg (206 lb)   Height: 165.1 cm (65\")     Estimated body mass index is 34.28 kg/m² as calculated from the following:    Height as of this encounter: 165.1 cm (65\").    Weight as of this encounter: 93.4 kg (206 lb).    BMI is >= 30 and <35. (Class 1 Obesity). The following options were offered after discussion;: exercise counseling/recommendations and nutrition counseling/recommendations      Does the patient have evidence of cognitive impairment?   Yes: Referral to neurology ordered.    Lab Results   Component Value Date    CHLPL 209 (H) 09/12/2023    TRIG 91 09/12/2023    HDL 70 09/12/2023     (H) 09/12/2023    VLDL 16 09/12/2023    " HGBA1C 5.5 2023          HEALTH RISK ASSESSMENT    Smoking Status:  Social History     Tobacco Use   Smoking Status Never   Smokeless Tobacco Never     Alcohol Consumption:  Social History     Substance and Sexual Activity   Alcohol Use Not Currently     Fall Risk Screen:    MATEO Fall Risk Assessment was completed, and patient is at LOW risk for falls.Assessment completed on:2023    Depression Screenin/12/2023     2:58 PM   PHQ-2/PHQ-9 Depression Screening   Little Interest or Pleasure in Doing Things 0-->not at all   Feeling Down, Depressed or Hopeless 0-->not at all   PHQ-9: Brief Depression Severity Measure Score 0       Health Habits and Functional and Cognitive Screenin/12/2023     3:00 PM   Functional & Cognitive Status   Do you have difficulty preparing food and eating? No   Do you have difficulty bathing yourself, getting dressed or grooming yourself? No   Do you have difficulty using the toilet? No   Do you have difficulty moving around from place to place? No   Do you have trouble with steps or getting out of a bed or a chair? No   Current Diet Unhealthy Diet   Dental Exam Up to date   Eye Exam Not up to date   Exercise (times per week) 0 times per week   Do you need help using the phone?  No   Are you deaf or do you have serious difficulty hearing?  No   Do you need help to go to places out of walking distance? No   Do you need help shopping? No   Do you need help preparing meals?  No   Do you need help with housework?  No   Do you need help with laundry? No   Do you need help taking your medications? No   Do you need help managing money? No   Do you ever drive or ride in a car without wearing a seat belt? No   Have you felt unusual stress, anger or loneliness in the last month? No   Who do you live with? Spouse   If you need help, do you have trouble finding someone available to you? No   Have you been bothered in the last four weeks by sexual problems? No   Do you have  difficulty concentrating, remembering or making decisions? Yes       Age-appropriate Screening Schedule:  Refer to the list below for future screening recommendations based on patient's age, sex and/or medical conditions. Orders for these recommended tests are listed in the plan section. The patient has been provided with a written plan.    Health Maintenance   Topic Date Due    BMI FOLLOWUP  Never done    COLORECTAL CANCER SCREENING  Never done    TDAP/TD VACCINES (1 - Tdap) Never done    COVID-19 Vaccine (3 - Mixed Product series) 05/27/2021    ANNUAL WELLNESS VISIT  Never done    INFLUENZA VACCINE  10/01/2023    ZOSTER VACCINE (2 of 2) 11/07/2023    Pneumococcal Vaccine 65+ (2 - PCV) 02/24/2024    LIPID PANEL  09/12/2024    MAMMOGRAM  05/26/2025    DXA SCAN  05/26/2025    HEPATITIS C SCREENING  Completed                  CMS Preventative Services Quick Reference  Risk Factors Identified During Encounter:    Fall Risk-High or Moderate: Discussed Fall Prevention in the home    The above risks/problems have been discussed with the patient.  Pertinent information has been shared with the patient in the After Visit Summary.    Diagnoses and all orders for this visit:    1. Essential (primary) hypertension (Primary)    2. Dementia without behavioral disturbance    3. Screening for hyperlipidemia  -     Comprehensive Metabolic Panel  -     Lipid Panel    4. Screening for deficiency anemia  -     CBC & Differential    5. Cerebrovascular disease    6. Screening for hematuria or proteinuria  -     Urinalysis With Culture If Indicated - Urine, Clean Catch    7. Need for shingles vaccine  -     Shingrix Vaccine    8. Allergy, initial encounter    9. Encounter for annual wellness exam in Medicare patient    Other orders  -     cetirizine (zyrTEC) 10 MG tablet; Take 1 tablet by mouth Daily.  Dispense: 30 tablet; Refill: 3  -     Microscopic Examination -  -     Urine culture, Comprehensive - ,  -     Verbal Order  -      Hemoglobin A1c  -     Written Authorization        Follow Up:   Next Medicare Wellness visit to be scheduled in 1 year.      An After Visit Summary and PPPS were made available to the patient.

## 2023-10-19 DIAGNOSIS — R73.09 ELEVATED GLUCOSE: ICD-10-CM

## 2023-10-19 DIAGNOSIS — R79.89 ELEVATED SERUM CREATININE: Primary | ICD-10-CM

## 2023-10-26 LAB
BUN SERPL-MCNC: 11 MG/DL (ref 8–27)
BUN/CREAT SERPL: 7 (ref 12–28)
CALCIUM SERPL-MCNC: 9.8 MG/DL (ref 8.7–10.3)
CHLORIDE SERPL-SCNC: 103 MMOL/L (ref 96–106)
CO2 SERPL-SCNC: 22 MMOL/L (ref 20–29)
CREAT SERPL-MCNC: 1.62 MG/DL (ref 0.57–1)
EGFRCR SERPLBLD CKD-EPI 2021: 34 ML/MIN/1.73
GLUCOSE SERPL-MCNC: 134 MG/DL (ref 70–99)
HBA1C MFR BLD: 5.3 % (ref 4.8–5.6)
POTASSIUM SERPL-SCNC: 3.9 MMOL/L (ref 3.5–5.2)
SODIUM SERPL-SCNC: 141 MMOL/L (ref 134–144)

## 2023-10-31 RX ORDER — FOLIC ACID 1 MG/1
TABLET ORAL
Qty: 30 TABLET | Refills: 0 | Status: SHIPPED | OUTPATIENT
Start: 2023-10-31

## 2024-02-20 RX ORDER — FOLIC ACID 1 MG/1
TABLET ORAL
Qty: 30 TABLET | Refills: 0 | Status: SHIPPED | OUTPATIENT
Start: 2024-02-20

## 2024-03-18 ENCOUNTER — TELEPHONE (OUTPATIENT)
Dept: NEUROLOGY | Facility: CLINIC | Age: 69
End: 2024-03-18

## 2024-03-18 NOTE — TELEPHONE ENCOUNTER
LVM for patients daughter. We can r/s in [place] on Friday 3/22 in the afternoon if she is available.

## 2024-06-11 ENCOUNTER — APPOINTMENT (OUTPATIENT)
Dept: CT IMAGING | Facility: HOSPITAL | Age: 69
DRG: 271 | End: 2024-06-11
Payer: MEDICARE

## 2024-06-11 ENCOUNTER — ANESTHESIA EVENT (OUTPATIENT)
Dept: PERIOP | Facility: HOSPITAL | Age: 69
End: 2024-06-11
Payer: MEDICARE

## 2024-06-11 ENCOUNTER — ANESTHESIA (OUTPATIENT)
Dept: PERIOP | Facility: HOSPITAL | Age: 69
End: 2024-06-11
Payer: MEDICARE

## 2024-06-11 ENCOUNTER — HOSPITAL ENCOUNTER (INPATIENT)
Facility: HOSPITAL | Age: 69
LOS: 3 days | Discharge: NURSING, REHAB OR LONG-TERM CARE | DRG: 271 | End: 2024-06-14
Attending: STUDENT IN AN ORGANIZED HEALTH CARE EDUCATION/TRAINING PROGRAM | Admitting: INTERNAL MEDICINE
Payer: MEDICARE

## 2024-06-11 ENCOUNTER — APPOINTMENT (OUTPATIENT)
Dept: GENERAL RADIOLOGY | Facility: HOSPITAL | Age: 69
DRG: 271 | End: 2024-06-11
Payer: MEDICARE

## 2024-06-11 DIAGNOSIS — I99.8 ISCHEMIC LEG: Primary | ICD-10-CM

## 2024-06-11 DIAGNOSIS — I82.409 DVT (DEEP VENOUS THROMBOSIS): ICD-10-CM

## 2024-06-11 PROBLEM — I70.209 ARTERIAL OCCLUSION, LOWER EXTREMITY: Status: ACTIVE | Noted: 2024-06-11

## 2024-06-11 LAB
ABO GROUP BLD: NORMAL
ALBUMIN SERPL-MCNC: 3.8 G/DL (ref 3.5–5.2)
ALBUMIN/GLOB SERPL: 1.4 G/DL
ALP SERPL-CCNC: 132 U/L (ref 39–117)
ALT SERPL W P-5'-P-CCNC: 14 U/L (ref 1–33)
ANION GAP SERPL CALCULATED.3IONS-SCNC: 11.5 MMOL/L (ref 5–15)
APTT PPP: 26.5 SECONDS (ref 22.7–35.4)
AST SERPL-CCNC: 25 U/L (ref 1–32)
BASOPHILS # BLD AUTO: 0.07 10*3/MM3 (ref 0–0.2)
BASOPHILS NFR BLD AUTO: 0.9 % (ref 0–1.5)
BILIRUB SERPL-MCNC: 0.7 MG/DL (ref 0–1.2)
BLD GP AB SCN SERPL QL: NEGATIVE
BUN SERPL-MCNC: 13 MG/DL (ref 8–23)
BUN/CREAT SERPL: 10.2 (ref 7–25)
CALCIUM SPEC-SCNC: 9.5 MG/DL (ref 8.6–10.5)
CHLORIDE SERPL-SCNC: 103 MMOL/L (ref 98–107)
CO2 SERPL-SCNC: 21.5 MMOL/L (ref 22–29)
CREAT SERPL-MCNC: 1.28 MG/DL (ref 0.57–1)
DEPRECATED RDW RBC AUTO: 41.5 FL (ref 37–54)
EGFRCR SERPLBLD CKD-EPI 2021: 45.7 ML/MIN/1.73
EOSINOPHIL # BLD AUTO: 0.27 10*3/MM3 (ref 0–0.4)
EOSINOPHIL NFR BLD AUTO: 3.3 % (ref 0.3–6.2)
ERYTHROCYTE [DISTWIDTH] IN BLOOD BY AUTOMATED COUNT: 13.1 % (ref 12.3–15.4)
GLOBULIN UR ELPH-MCNC: 2.8 GM/DL
GLUCOSE SERPL-MCNC: 119 MG/DL (ref 65–99)
HCT VFR BLD AUTO: 42.4 % (ref 34–46.6)
HGB BLD-MCNC: 14.7 G/DL (ref 12–15.9)
IMM GRANULOCYTES # BLD AUTO: 0.08 10*3/MM3 (ref 0–0.05)
IMM GRANULOCYTES NFR BLD AUTO: 1 % (ref 0–0.5)
INR PPP: 1.02 (ref 0.9–1.1)
LYMPHOCYTES # BLD AUTO: 1.56 10*3/MM3 (ref 0.7–3.1)
LYMPHOCYTES NFR BLD AUTO: 19.2 % (ref 19.6–45.3)
MCH RBC QN AUTO: 30.4 PG (ref 26.6–33)
MCHC RBC AUTO-ENTMCNC: 34.7 G/DL (ref 31.5–35.7)
MCV RBC AUTO: 87.8 FL (ref 79–97)
MONOCYTES # BLD AUTO: 0.57 10*3/MM3 (ref 0.1–0.9)
MONOCYTES NFR BLD AUTO: 7 % (ref 5–12)
NEUTROPHILS NFR BLD AUTO: 5.58 10*3/MM3 (ref 1.7–7)
NEUTROPHILS NFR BLD AUTO: 68.6 % (ref 42.7–76)
NRBC BLD AUTO-RTO: 0 /100 WBC (ref 0–0.2)
PLATELET # BLD AUTO: 246 10*3/MM3 (ref 140–450)
PMV BLD AUTO: 10.2 FL (ref 6–12)
POTASSIUM SERPL-SCNC: 3.6 MMOL/L (ref 3.5–5.2)
PROT SERPL-MCNC: 6.6 G/DL (ref 6–8.5)
PROTHROMBIN TIME: 13.6 SECONDS (ref 11.7–14.2)
RBC # BLD AUTO: 4.83 10*6/MM3 (ref 3.77–5.28)
RH BLD: POSITIVE
SODIUM SERPL-SCNC: 136 MMOL/L (ref 136–145)
T&S EXPIRATION DATE: NORMAL
WBC NRBC COR # BLD AUTO: 8.13 10*3/MM3 (ref 3.4–10.8)

## 2024-06-11 PROCEDURE — 25510000001 IOPAMIDOL PER 1 ML: Performed by: STUDENT IN AN ORGANIZED HEALTH CARE EDUCATION/TRAINING PROGRAM

## 2024-06-11 PROCEDURE — 04CS3ZZ EXTIRPATION OF MATTER FROM LEFT POSTERIOR TIBIAL ARTERY, PERCUTANEOUS APPROACH: ICD-10-PCS | Performed by: SURGERY

## 2024-06-11 PROCEDURE — 04CJ3ZZ EXTIRPATION OF MATTER FROM LEFT EXTERNAL ILIAC ARTERY, PERCUTANEOUS APPROACH: ICD-10-PCS | Performed by: SURGERY

## 2024-06-11 PROCEDURE — C1769 GUIDE WIRE: HCPCS | Performed by: SURGERY

## 2024-06-11 PROCEDURE — C1894 INTRO/SHEATH, NON-LASER: HCPCS | Performed by: SURGERY

## 2024-06-11 PROCEDURE — 85610 PROTHROMBIN TIME: CPT | Performed by: STUDENT IN AN ORGANIZED HEALTH CARE EDUCATION/TRAINING PROGRAM

## 2024-06-11 PROCEDURE — 80053 COMPREHEN METABOLIC PANEL: CPT | Performed by: STUDENT IN AN ORGANIZED HEALTH CARE EDUCATION/TRAINING PROGRAM

## 2024-06-11 PROCEDURE — 25010000002 NICARDIPINE 2.5 MG/ML SOLUTION: Performed by: NURSE ANESTHETIST, CERTIFIED REGISTERED

## 2024-06-11 PROCEDURE — 25010000002 CEFAZOLIN PER 500 MG: Performed by: SURGERY

## 2024-06-11 PROCEDURE — 75710 ARTERY X-RAYS ARM/LEG: CPT | Performed by: SURGERY

## 2024-06-11 PROCEDURE — 25010000002 SUGAMMADEX 200 MG/2ML SOLUTION: Performed by: NURSE ANESTHETIST, CERTIFIED REGISTERED

## 2024-06-11 PROCEDURE — 25810000003 LACTATED RINGERS PER 1000 ML: Performed by: NURSE ANESTHETIST, CERTIFIED REGISTERED

## 2024-06-11 PROCEDURE — 86900 BLOOD TYPING SEROLOGIC ABO: CPT | Performed by: SURGERY

## 2024-06-11 PROCEDURE — C1757 CATH, THROMBECTOMY/EMBOLECT: HCPCS | Performed by: SURGERY

## 2024-06-11 PROCEDURE — 85025 COMPLETE CBC W/AUTO DIFF WBC: CPT | Performed by: STUDENT IN AN ORGANIZED HEALTH CARE EDUCATION/TRAINING PROGRAM

## 2024-06-11 PROCEDURE — 25010000002 FENTANYL CITRATE (PF) 50 MCG/ML SOLUTION: Performed by: NURSE ANESTHETIST, CERTIFIED REGISTERED

## 2024-06-11 PROCEDURE — 25010000002 MORPHINE PER 10 MG: Performed by: SURGERY

## 2024-06-11 PROCEDURE — 04CQ3ZZ EXTIRPATION OF MATTER FROM LEFT ANTERIOR TIBIAL ARTERY, PERCUTANEOUS APPROACH: ICD-10-PCS | Performed by: SURGERY

## 2024-06-11 PROCEDURE — 88304 TISSUE EXAM BY PATHOLOGIST: CPT | Performed by: SURGERY

## 2024-06-11 PROCEDURE — 25010000002 FENTANYL CITRATE (PF) 50 MCG/ML SOLUTION

## 2024-06-11 PROCEDURE — 25010000002 HEPARIN (PORCINE) PER 1000 UNITS: Performed by: SURGERY

## 2024-06-11 PROCEDURE — C1887 CATHETER, GUIDING: HCPCS | Performed by: SURGERY

## 2024-06-11 PROCEDURE — 25510000001 IOPAMIDOL PER 1 ML: Performed by: INTERNAL MEDICINE

## 2024-06-11 PROCEDURE — 04CL3ZZ EXTIRPATION OF MATTER FROM LEFT FEMORAL ARTERY, PERCUTANEOUS APPROACH: ICD-10-PCS | Performed by: SURGERY

## 2024-06-11 PROCEDURE — 86850 RBC ANTIBODY SCREEN: CPT | Performed by: SURGERY

## 2024-06-11 PROCEDURE — 04CN3ZZ EXTIRPATION OF MATTER FROM LEFT POPLITEAL ARTERY, PERCUTANEOUS APPROACH: ICD-10-PCS | Performed by: SURGERY

## 2024-06-11 PROCEDURE — 75635 CT ANGIO ABDOMINAL ARTERIES: CPT

## 2024-06-11 PROCEDURE — B41G1ZZ FLUOROSCOPY OF LEFT LOWER EXTREMITY ARTERIES USING LOW OSMOLAR CONTRAST: ICD-10-PCS | Performed by: SURGERY

## 2024-06-11 PROCEDURE — 25010000002 HEPARIN (PORCINE) 25000-0.45 UT/250ML-% SOLUTION: Performed by: SURGERY

## 2024-06-11 PROCEDURE — 34201 REMOVAL OF ARTERY CLOT: CPT | Performed by: SURGERY

## 2024-06-11 PROCEDURE — 04CD3ZZ EXTIRPATION OF MATTER FROM LEFT COMMON ILIAC ARTERY, PERCUTANEOUS APPROACH: ICD-10-PCS | Performed by: SURGERY

## 2024-06-11 PROCEDURE — 99223 1ST HOSP IP/OBS HIGH 75: CPT | Performed by: SURGERY

## 2024-06-11 PROCEDURE — 25010000002 MORPHINE PER 10 MG: Performed by: STUDENT IN AN ORGANIZED HEALTH CARE EDUCATION/TRAINING PROGRAM

## 2024-06-11 PROCEDURE — 25010000002 HEPARIN (PORCINE) 25000-0.45 UT/250ML-% SOLUTION: Performed by: STUDENT IN AN ORGANIZED HEALTH CARE EDUCATION/TRAINING PROGRAM

## 2024-06-11 PROCEDURE — 82565 ASSAY OF CREATININE: CPT

## 2024-06-11 PROCEDURE — 25010000002 HEPARIN (PORCINE) PER 1000 UNITS: Performed by: NURSE ANESTHETIST, CERTIFIED REGISTERED

## 2024-06-11 PROCEDURE — 25010000002 HEPARIN (PORCINE) PER 1000 UNITS: Performed by: STUDENT IN AN ORGANIZED HEALTH CARE EDUCATION/TRAINING PROGRAM

## 2024-06-11 PROCEDURE — 85347 COAGULATION TIME ACTIVATED: CPT

## 2024-06-11 PROCEDURE — 85730 THROMBOPLASTIN TIME PARTIAL: CPT | Performed by: STUDENT IN AN ORGANIZED HEALTH CARE EDUCATION/TRAINING PROGRAM

## 2024-06-11 PROCEDURE — 34203 REMOVAL OF LEG ARTERY CLOT: CPT | Performed by: SURGERY

## 2024-06-11 PROCEDURE — 99291 CRITICAL CARE FIRST HOUR: CPT

## 2024-06-11 PROCEDURE — 86901 BLOOD TYPING SEROLOGIC RH(D): CPT | Performed by: SURGERY

## 2024-06-11 PROCEDURE — 25010000002 PROPOFOL 200 MG/20ML EMULSION: Performed by: ANESTHESIOLOGY

## 2024-06-11 PROCEDURE — 25010000002 ONDANSETRON PER 1 MG: Performed by: NURSE ANESTHETIST, CERTIFIED REGISTERED

## 2024-06-11 PROCEDURE — 99285 EMERGENCY DEPT VISIT HI MDM: CPT

## 2024-06-11 DEVICE — LIGACLIP MCA MULTIPLE CLIP APPLIERS, 20 MEDIUM CLIPS
Type: IMPLANTABLE DEVICE | Site: GROIN | Status: FUNCTIONAL
Brand: LIGACLIP

## 2024-06-11 DEVICE — LIGACLIP MCA MULTIPLE CLIP APPLIERS, 20 SMALL CLIPS
Type: IMPLANTABLE DEVICE | Site: GROIN | Status: FUNCTIONAL
Brand: LIGACLIP

## 2024-06-11 DEVICE — FLOSEAL WITH RECOTHROM - 10ML.
Type: IMPLANTABLE DEVICE | Site: GROIN | Status: FUNCTIONAL
Brand: FLOSEAL HEMOSTATIC MATRIX

## 2024-06-11 RX ORDER — SODIUM CHLORIDE 0.9 % (FLUSH) 0.9 %
10 SYRINGE (ML) INJECTION AS NEEDED
Status: DISCONTINUED | OUTPATIENT
Start: 2024-06-11 | End: 2024-06-15 | Stop reason: HOSPADM

## 2024-06-11 RX ORDER — SODIUM CHLORIDE, SODIUM LACTATE, POTASSIUM CHLORIDE, CALCIUM CHLORIDE 600; 310; 30; 20 MG/100ML; MG/100ML; MG/100ML; MG/100ML
INJECTION, SOLUTION INTRAVENOUS CONTINUOUS PRN
Status: DISCONTINUED | OUTPATIENT
Start: 2024-06-11 | End: 2024-06-11 | Stop reason: SURG

## 2024-06-11 RX ORDER — FLUMAZENIL 0.1 MG/ML
0.2 INJECTION INTRAVENOUS AS NEEDED
Status: DISCONTINUED | OUTPATIENT
Start: 2024-06-11 | End: 2024-06-11 | Stop reason: HOSPADM

## 2024-06-11 RX ORDER — SODIUM CHLORIDE 0.9 % (FLUSH) 0.9 %
10 SYRINGE (ML) INJECTION EVERY 12 HOURS SCHEDULED
Status: DISCONTINUED | OUTPATIENT
Start: 2024-06-11 | End: 2024-06-15 | Stop reason: HOSPADM

## 2024-06-11 RX ORDER — LIDOCAINE HYDROCHLORIDE 20 MG/ML
INJECTION, SOLUTION INFILTRATION; PERINEURAL AS NEEDED
Status: DISCONTINUED | OUTPATIENT
Start: 2024-06-11 | End: 2024-06-11 | Stop reason: SURG

## 2024-06-11 RX ORDER — IPRATROPIUM BROMIDE AND ALBUTEROL SULFATE 2.5; .5 MG/3ML; MG/3ML
3 SOLUTION RESPIRATORY (INHALATION) ONCE AS NEEDED
Status: DISCONTINUED | OUTPATIENT
Start: 2024-06-11 | End: 2024-06-11 | Stop reason: HOSPADM

## 2024-06-11 RX ORDER — ACETAMINOPHEN 160 MG/5ML
650 SOLUTION ORAL EVERY 4 HOURS PRN
Status: DISCONTINUED | OUTPATIENT
Start: 2024-06-11 | End: 2024-06-11

## 2024-06-11 RX ORDER — ALBUTEROL SULFATE 90 UG/1
2 AEROSOL, METERED RESPIRATORY (INHALATION) EVERY 4 HOURS PRN
Status: DISCONTINUED | OUTPATIENT
Start: 2024-06-11 | End: 2024-06-11 | Stop reason: CLARIF

## 2024-06-11 RX ORDER — EPHEDRINE SULFATE 50 MG/ML
5 INJECTION, SOLUTION INTRAVENOUS ONCE AS NEEDED
Status: DISCONTINUED | OUTPATIENT
Start: 2024-06-11 | End: 2024-06-11 | Stop reason: HOSPADM

## 2024-06-11 RX ORDER — MORPHINE SULFATE 2 MG/ML
4 INJECTION, SOLUTION INTRAMUSCULAR; INTRAVENOUS ONCE
Status: COMPLETED | OUTPATIENT
Start: 2024-06-11 | End: 2024-06-11

## 2024-06-11 RX ORDER — FENTANYL CITRATE 50 UG/ML
INJECTION, SOLUTION INTRAMUSCULAR; INTRAVENOUS
Status: COMPLETED
Start: 2024-06-11 | End: 2024-06-11

## 2024-06-11 RX ORDER — FOLIC ACID 1 MG/1
1000 TABLET ORAL DAILY
Status: DISCONTINUED | OUTPATIENT
Start: 2024-06-12 | End: 2024-06-15 | Stop reason: HOSPADM

## 2024-06-11 RX ORDER — HEPARIN SODIUM 10000 [USP'U]/100ML
5 INJECTION, SOLUTION INTRAVENOUS
Status: DISCONTINUED | OUTPATIENT
Start: 2024-06-11 | End: 2024-06-13

## 2024-06-11 RX ORDER — HEPARIN SODIUM 5000 [USP'U]/ML
44.1 INJECTION, SOLUTION INTRAVENOUS; SUBCUTANEOUS ONCE
Status: COMPLETED | OUTPATIENT
Start: 2024-06-11 | End: 2024-06-11

## 2024-06-11 RX ORDER — PROPOFOL 10 MG/ML
INJECTION, EMULSION INTRAVENOUS AS NEEDED
Status: DISCONTINUED | OUTPATIENT
Start: 2024-06-11 | End: 2024-06-11 | Stop reason: SURG

## 2024-06-11 RX ORDER — HYDROCODONE BITARTRATE AND ACETAMINOPHEN 7.5; 325 MG/1; MG/1
1 TABLET ORAL EVERY 4 HOURS PRN
Status: DISCONTINUED | OUTPATIENT
Start: 2024-06-11 | End: 2024-06-11 | Stop reason: HOSPADM

## 2024-06-11 RX ORDER — NICARDIPINE HYDROCHLORIDE 2.5 MG/ML
INJECTION INTRAVENOUS AS NEEDED
Status: DISCONTINUED | OUTPATIENT
Start: 2024-06-11 | End: 2024-06-11 | Stop reason: SURG

## 2024-06-11 RX ORDER — SODIUM CHLORIDE 9 MG/ML
40 INJECTION, SOLUTION INTRAVENOUS AS NEEDED
Status: DISCONTINUED | OUTPATIENT
Start: 2024-06-11 | End: 2024-06-15 | Stop reason: HOSPADM

## 2024-06-11 RX ORDER — CETIRIZINE HYDROCHLORIDE 10 MG/1
10 TABLET ORAL DAILY
Status: DISCONTINUED | OUTPATIENT
Start: 2024-06-12 | End: 2024-06-15 | Stop reason: HOSPADM

## 2024-06-11 RX ORDER — ASPIRIN 81 MG/1
81 TABLET ORAL DAILY
Status: DISCONTINUED | OUTPATIENT
Start: 2024-06-12 | End: 2024-06-15 | Stop reason: HOSPADM

## 2024-06-11 RX ORDER — MORPHINE SULFATE 2 MG/ML
4 INJECTION, SOLUTION INTRAMUSCULAR; INTRAVENOUS EVERY 4 HOURS PRN
Status: DISCONTINUED | OUTPATIENT
Start: 2024-06-11 | End: 2024-06-15 | Stop reason: HOSPADM

## 2024-06-11 RX ORDER — DIPHENHYDRAMINE HYDROCHLORIDE 50 MG/ML
12.5 INJECTION INTRAMUSCULAR; INTRAVENOUS
Status: DISCONTINUED | OUTPATIENT
Start: 2024-06-11 | End: 2024-06-11 | Stop reason: HOSPADM

## 2024-06-11 RX ORDER — ROCURONIUM BROMIDE 10 MG/ML
INJECTION, SOLUTION INTRAVENOUS AS NEEDED
Status: DISCONTINUED | OUTPATIENT
Start: 2024-06-11 | End: 2024-06-11 | Stop reason: SURG

## 2024-06-11 RX ORDER — ACETAMINOPHEN 325 MG/1
650 TABLET ORAL EVERY 4 HOURS PRN
Status: DISCONTINUED | OUTPATIENT
Start: 2024-06-11 | End: 2024-06-11

## 2024-06-11 RX ORDER — ONDANSETRON 2 MG/ML
4 INJECTION INTRAMUSCULAR; INTRAVENOUS ONCE AS NEEDED
Status: DISCONTINUED | OUTPATIENT
Start: 2024-06-11 | End: 2024-06-11 | Stop reason: HOSPADM

## 2024-06-11 RX ORDER — HEPARIN SODIUM 1000 [USP'U]/ML
INJECTION, SOLUTION INTRAVENOUS; SUBCUTANEOUS AS NEEDED
Status: DISCONTINUED | OUTPATIENT
Start: 2024-06-11 | End: 2024-06-11 | Stop reason: SURG

## 2024-06-11 RX ORDER — LABETALOL HYDROCHLORIDE 5 MG/ML
5 INJECTION, SOLUTION INTRAVENOUS
Status: DISCONTINUED | OUTPATIENT
Start: 2024-06-11 | End: 2024-06-11 | Stop reason: HOSPADM

## 2024-06-11 RX ORDER — ONDANSETRON 2 MG/ML
INJECTION INTRAMUSCULAR; INTRAVENOUS AS NEEDED
Status: DISCONTINUED | OUTPATIENT
Start: 2024-06-11 | End: 2024-06-11 | Stop reason: SURG

## 2024-06-11 RX ORDER — FENOFIBRATE 145 MG/1
145 TABLET, COATED ORAL DAILY
Status: DISCONTINUED | OUTPATIENT
Start: 2024-06-12 | End: 2024-06-15 | Stop reason: HOSPADM

## 2024-06-11 RX ORDER — ATORVASTATIN CALCIUM 10 MG/1
10 TABLET, FILM COATED ORAL DAILY
Status: DISCONTINUED | OUTPATIENT
Start: 2024-06-12 | End: 2024-06-15 | Stop reason: HOSPADM

## 2024-06-11 RX ORDER — HYDRALAZINE HYDROCHLORIDE 20 MG/ML
5 INJECTION INTRAMUSCULAR; INTRAVENOUS
Status: DISCONTINUED | OUTPATIENT
Start: 2024-06-11 | End: 2024-06-11 | Stop reason: HOSPADM

## 2024-06-11 RX ORDER — BUDESONIDE AND FORMOTEROL FUMARATE DIHYDRATE 160; 4.5 UG/1; UG/1
2 AEROSOL RESPIRATORY (INHALATION) 2 TIMES DAILY
Status: DISCONTINUED | OUTPATIENT
Start: 2024-06-12 | End: 2024-06-15 | Stop reason: HOSPADM

## 2024-06-11 RX ORDER — DONEPEZIL HYDROCHLORIDE 10 MG/1
10 TABLET, FILM COATED ORAL NIGHTLY
Status: DISCONTINUED | OUTPATIENT
Start: 2024-06-11 | End: 2024-06-15 | Stop reason: HOSPADM

## 2024-06-11 RX ORDER — ALBUTEROL SULFATE 2.5 MG/3ML
2.5 SOLUTION RESPIRATORY (INHALATION) EVERY 4 HOURS PRN
Status: DISCONTINUED | OUTPATIENT
Start: 2024-06-11 | End: 2024-06-15 | Stop reason: HOSPADM

## 2024-06-11 RX ORDER — ACETAMINOPHEN 650 MG/1
650 SUPPOSITORY RECTAL EVERY 4 HOURS PRN
Status: DISCONTINUED | OUTPATIENT
Start: 2024-06-11 | End: 2024-06-11

## 2024-06-11 RX ORDER — NALOXONE HCL 0.4 MG/ML
0.2 VIAL (ML) INJECTION AS NEEDED
Status: DISCONTINUED | OUTPATIENT
Start: 2024-06-11 | End: 2024-06-11 | Stop reason: HOSPADM

## 2024-06-11 RX ORDER — MEMANTINE HYDROCHLORIDE 10 MG/1
10 TABLET ORAL 2 TIMES DAILY
Status: DISCONTINUED | OUTPATIENT
Start: 2024-06-11 | End: 2024-06-15 | Stop reason: HOSPADM

## 2024-06-11 RX ORDER — MIDAZOLAM HYDROCHLORIDE 1 MG/ML
INJECTION INTRAMUSCULAR; INTRAVENOUS
Status: ACTIVE
Start: 2024-06-11 | End: 2024-06-12

## 2024-06-11 RX ORDER — SODIUM CHLORIDE 9 MG/ML
75 INJECTION, SOLUTION INTRAVENOUS CONTINUOUS
Status: DISCONTINUED | OUTPATIENT
Start: 2024-06-11 | End: 2024-06-15 | Stop reason: HOSPADM

## 2024-06-11 RX ORDER — PROMETHAZINE HYDROCHLORIDE 25 MG/1
25 TABLET ORAL ONCE AS NEEDED
Status: DISCONTINUED | OUTPATIENT
Start: 2024-06-11 | End: 2024-06-11 | Stop reason: HOSPADM

## 2024-06-11 RX ORDER — HEPARIN SODIUM 10000 [USP'U]/100ML
11 INJECTION, SOLUTION INTRAVENOUS
Status: DISCONTINUED | OUTPATIENT
Start: 2024-06-11 | End: 2024-06-11

## 2024-06-11 RX ORDER — FENTANYL CITRATE 50 UG/ML
25 INJECTION, SOLUTION INTRAMUSCULAR; INTRAVENOUS
Status: DISCONTINUED | OUTPATIENT
Start: 2024-06-11 | End: 2024-06-11 | Stop reason: HOSPADM

## 2024-06-11 RX ORDER — PROMETHAZINE HYDROCHLORIDE 25 MG/1
25 SUPPOSITORY RECTAL ONCE AS NEEDED
Status: DISCONTINUED | OUTPATIENT
Start: 2024-06-11 | End: 2024-06-11 | Stop reason: HOSPADM

## 2024-06-11 RX ORDER — HEPARIN SODIUM 5000 [USP'U]/ML
30-44.1 INJECTION, SOLUTION INTRAVENOUS; SUBCUTANEOUS EVERY 6 HOURS PRN
Status: DISCONTINUED | OUTPATIENT
Start: 2024-06-11 | End: 2024-06-11

## 2024-06-11 RX ORDER — FENTANYL CITRATE 50 UG/ML
INJECTION, SOLUTION INTRAMUSCULAR; INTRAVENOUS AS NEEDED
Status: DISCONTINUED | OUTPATIENT
Start: 2024-06-11 | End: 2024-06-11 | Stop reason: SURG

## 2024-06-11 RX ORDER — HYDROMORPHONE HYDROCHLORIDE 1 MG/ML
0.25 INJECTION, SOLUTION INTRAMUSCULAR; INTRAVENOUS; SUBCUTANEOUS
Status: DISCONTINUED | OUTPATIENT
Start: 2024-06-11 | End: 2024-06-11 | Stop reason: HOSPADM

## 2024-06-11 RX ORDER — DROPERIDOL 2.5 MG/ML
0.62 INJECTION, SOLUTION INTRAMUSCULAR; INTRAVENOUS
Status: DISCONTINUED | OUTPATIENT
Start: 2024-06-11 | End: 2024-06-11 | Stop reason: HOSPADM

## 2024-06-11 RX ORDER — HYDROCODONE BITARTRATE AND ACETAMINOPHEN 5; 325 MG/1; MG/1
1 TABLET ORAL ONCE AS NEEDED
Status: DISCONTINUED | OUTPATIENT
Start: 2024-06-11 | End: 2024-06-11 | Stop reason: HOSPADM

## 2024-06-11 RX ORDER — LANOLIN ALCOHOL/MO/W.PET/CERES
100 CREAM (GRAM) TOPICAL DAILY
Status: DISCONTINUED | OUTPATIENT
Start: 2024-06-12 | End: 2024-06-15 | Stop reason: HOSPADM

## 2024-06-11 RX ORDER — NITROGLYCERIN 0.4 MG/1
0.4 TABLET SUBLINGUAL
Status: DISCONTINUED | OUTPATIENT
Start: 2024-06-11 | End: 2024-06-15 | Stop reason: HOSPADM

## 2024-06-11 RX ORDER — PHENYLEPHRINE HCL IN 0.9% NACL 1 MG/10 ML
SYRINGE (ML) INTRAVENOUS AS NEEDED
Status: DISCONTINUED | OUTPATIENT
Start: 2024-06-11 | End: 2024-06-11 | Stop reason: SURG

## 2024-06-11 RX ADMIN — PROPOFOL 120 MG: 10 INJECTION, EMULSION INTRAVENOUS at 17:10

## 2024-06-11 RX ADMIN — MORPHINE SULFATE 4 MG: 2 INJECTION, SOLUTION INTRAMUSCULAR; INTRAVENOUS at 15:40

## 2024-06-11 RX ADMIN — Medication 100 MCG: at 17:13

## 2024-06-11 RX ADMIN — HEPARIN SODIUM 11 UNITS/KG/HR: 10000 INJECTION, SOLUTION INTRAVENOUS at 15:44

## 2024-06-11 RX ADMIN — SODIUM CHLORIDE, POTASSIUM CHLORIDE, SODIUM LACTATE AND CALCIUM CHLORIDE: 600; 310; 30; 20 INJECTION, SOLUTION INTRAVENOUS at 16:59

## 2024-06-11 RX ADMIN — IOPAMIDOL 80 ML: 510 INJECTION, SOLUTION INTRAVASCULAR at 19:30

## 2024-06-11 RX ADMIN — ROCURONIUM BROMIDE 15 MG: 10 INJECTION, SOLUTION INTRAVENOUS at 18:41

## 2024-06-11 RX ADMIN — NICARDIPINE HYDROCHLORIDE 0.75 MG: 25 INJECTION, SOLUTION INTRAVENOUS at 17:47

## 2024-06-11 RX ADMIN — NICARDIPINE HYDROCHLORIDE 0.75 MG: 25 INJECTION, SOLUTION INTRAVENOUS at 18:12

## 2024-06-11 RX ADMIN — FENTANYL CITRATE 25 MCG: 50 INJECTION, SOLUTION INTRAMUSCULAR; INTRAVENOUS at 20:26

## 2024-06-11 RX ADMIN — HEPARIN SODIUM 10000 UNITS: 1000 INJECTION, SOLUTION INTRAVENOUS; SUBCUTANEOUS at 17:42

## 2024-06-11 RX ADMIN — HEPARIN SODIUM 4000 UNITS: 5000 INJECTION INTRAVENOUS; SUBCUTANEOUS at 15:42

## 2024-06-11 RX ADMIN — IOPAMIDOL 100 ML: 755 INJECTION, SOLUTION INTRAVENOUS at 14:10

## 2024-06-11 RX ADMIN — ROCURONIUM BROMIDE 35 MG: 10 INJECTION, SOLUTION INTRAVENOUS at 17:10

## 2024-06-11 RX ADMIN — SODIUM CHLORIDE 2000 MG: 900 INJECTION INTRAVENOUS at 16:46

## 2024-06-11 RX ADMIN — HEPARIN SODIUM 5 UNITS/KG/HR: 10000 INJECTION, SOLUTION INTRAVENOUS at 20:12

## 2024-06-11 RX ADMIN — FENTANYL CITRATE 50 MCG: 50 INJECTION, SOLUTION INTRAMUSCULAR; INTRAVENOUS at 16:25

## 2024-06-11 RX ADMIN — ONDANSETRON 4 MG: 2 INJECTION INTRAMUSCULAR; INTRAVENOUS at 19:16

## 2024-06-11 RX ADMIN — NICARDIPINE HYDROCHLORIDE 0.75 MG: 25 INJECTION, SOLUTION INTRAVENOUS at 19:21

## 2024-06-11 RX ADMIN — SUGAMMADEX 200 MG: 100 INJECTION, SOLUTION INTRAVENOUS at 19:27

## 2024-06-11 RX ADMIN — FENTANYL CITRATE 25 MCG: 50 INJECTION, SOLUTION INTRAMUSCULAR; INTRAVENOUS at 18:00

## 2024-06-11 RX ADMIN — MEMANTINE HYDROCHLORIDE 10 MG: 10 TABLET, FILM COATED ORAL at 23:38

## 2024-06-11 RX ADMIN — SODIUM CHLORIDE, POTASSIUM CHLORIDE, SODIUM LACTATE AND CALCIUM CHLORIDE: 600; 310; 30; 20 INJECTION, SOLUTION INTRAVENOUS at 18:47

## 2024-06-11 RX ADMIN — DONEPEZIL HYDROCHLORIDE 10 MG: 10 TABLET, FILM COATED ORAL at 23:38

## 2024-06-11 RX ADMIN — LIDOCAINE HYDROCHLORIDE 60 MG: 20 INJECTION, SOLUTION INFILTRATION; PERINEURAL at 17:10

## 2024-06-11 RX ADMIN — FENTANYL CITRATE 25 MCG: 50 INJECTION, SOLUTION INTRAMUSCULAR; INTRAVENOUS at 20:43

## 2024-06-11 RX ADMIN — FENTANYL CITRATE 25 MCG: 50 INJECTION, SOLUTION INTRAMUSCULAR; INTRAVENOUS at 18:39

## 2024-06-11 RX ADMIN — Medication 10 ML: at 23:50

## 2024-06-11 RX ADMIN — MORPHINE SULFATE 4 MG: 2 INJECTION, SOLUTION INTRAMUSCULAR; INTRAVENOUS at 22:06

## 2024-06-11 RX ADMIN — PROPOFOL 50 MG: 10 INJECTION, EMULSION INTRAVENOUS at 17:41

## 2024-06-11 NOTE — H&P
Internal medicine history and physical  INTERNAL MEDICINE   [facility]       Patient Identification:  Name: Erin Jones  Age: 68 y.o.  Sex: female  :  1955  MRN: [id number]                   Primary Care Physician: Laurie De La Rosa APRN                               Date of admission:2024    Chief Complaint: Came to the emergency room with sudden onset of left foot pain and left ankle pain started today.    History of Present Illness:   Source of information review of records as patient was seen after emergent revascularization surgery in PACU and currently still under the effect of anesthesia in the setting of underlying Alzheimer's disease.  Patient is a 68-year-old male who has memory issues and being treated for Alzheimer's dementia along with underlying history of asthma hypertension chronic kidney disease and prior history of DVT and pulmonary embolism and CVA apparently was in her usual state of her health until today when she started complaining of severe pain in her left foot and ankle.  Patient was taken to the emergency room where workup revealed dusky appearing left foot with significant tenderness to palpation and inability by the providers in the ER to feel her posterior tibial and dorsal pedis pulse.  Suspicion for acute arterial occlusion/ischemia of the left foot was raised vascular surgery was consulted and CT angiogram was performed that showed complete occlusion of the left mid popliteal artery as well as nonocclusive thrombus in the left iliac artery and atherosclerotic changes of the right renal artery.  Patient was immediately started on IV heparin and kept n.p.o. and then taken to the operating room ER and had emergent surgery performed to restore acute on chronic left lower extremity ischemia.  Patient underwent left femoral based embolectomy of the common iliac artery, external iliac artery, superficial femoral artery and popliteal artery along with  left anterior tibial artery embolectomy via separate ankle incision and left posterior tibial artery embolectomy via separate incision at the ankle.  Patient was then subsequently transferred to PACU after successful extubation.  Patient able to answer simple questions but does not point to the details of the conversation and according to the caring nurse in the PACU she just received another pain medication for discomfort in her left leg.  She is able to maintain oxygen saturation greater than 95% on room air and noted to be hemodynamically stable.  Patient being admitted to vascular unit with telemetry to monitor and further care.      Past Medical History:  Past Medical History:   Diagnosis Date    Asthma 01/27/2022    Dementia 08/20/2020    Hyperlipidemia 09/21/2015    Hypertension 09/21/2015    Memory loss 04/25/2019    Mixed anxiety and depressive disorder 12/03/2018    Obesity (BMI 30-39.9) 01/27/2022    Stage 3 chronic kidney disease 09/21/2015     Past Surgical History:  History reviewed. No pertinent surgical history.   Home Meds:  Medications Prior to Admission   Medication Sig Dispense Refill Last Dose    albuterol sulfate  (90 Base) MCG/ACT inhaler inhale TWO puffs BY MOUTH EVERY 4 HOURS AS NEEDED FOR WHEEZING 8.5 g 0     Aspirin Buf,CaCarb-MgCarb-MgO, 81 MG tablet Take  by mouth.       atorvastatin (Lipitor) 10 MG tablet Take 1 tablet by mouth Daily. 90 tablet 1     cetirizine (zyrTEC) 10 MG tablet Take 1 tablet by mouth Daily. 30 tablet 3     Cyanocobalamin (Vitamin B-12) 1000 MCG sublingual tablet Place 1 tablet under the tongue Daily. 30 each 3     donepezil (ARICEPT) 10 MG tablet Take 1 tablet by mouth Every Night. 90 tablet 1     fenofibrate 160 MG tablet Take 1 tablet by mouth Daily.       folic acid (FOLVITE) 1 MG tablet TAKE ONE TABLET BY MOUTH DAILY 30 tablet 0     memantine (NAMENDA) 10 MG tablet Take 1 tablet by mouth 2 (Two) Times a Day. 180 tablet 1     Symbicort 80-4.5 MCG/ACT  "inhaler INHALE TWO puffs TWICE DAILY 10.2 g 6     vitamin B-6 (PYRIDOXINE) 100 MG tablet Take 1 tablet by mouth Daily. 30 tablet 3     Xarelto 20 MG tablet TAKE ONE TABLET BY MOUTH EVERY DAY 30 tablet 3      Current Meds:     Current Facility-Administered Medications:     ceFAZolin 2000 mg IVPB in 100 mL NS (MBP), 2,000 mg, Intravenous, Once, Clifton Guy MD, 2,000 mg at 06/11/24 1646    [Transfer Hold] heparin (porcine) 5000 UNIT/ML injection 2,700-4,000 Units, 30-44.1 Units/kg, Intravenous, Q6H PRN, Gilbert Wright MD    heparin 95169 units/250 mL (100 units/mL) in 0.45 % NaCl infusion, 11 Units/kg/hr, Intravenous, Titrated, Gilbert Wright MD, Last Rate: 9.97 mL/hr at 06/11/24 1547, 11 Units/kg/hr at 06/11/24 1547    midazolam (VERSED) 2 MG/2ML injection  - ADS Override Pull, , , ,   Allergies:  No Known Allergies  Social History:   Social History     Tobacco Use    Smoking status: Never    Smokeless tobacco: Never   Substance Use Topics    Alcohol use: Not Currently      Family History:  Family History   Family history unknown: Yes          Review of Systems  See history of present illness and past medical history.        Vitals:   /93   Pulse 81   Temp 97.7 °F (36.5 °C)   Resp 18   Ht 165.1 cm (65\")   Wt 90.7 kg (200 lb)   SpO2 98%   BMI 33.28 kg/m²   I/O: No intake or output data in the 24 hours ending 06/11/24 1653  Exam:  Patient is examined using the personal protective equipment as per guidelines from infection control for this particular patient as enacted.  Hand washing was performed before and after patient interaction.  General Appearance:  Somnolent lethargic but cooperative female   Head:    Normocephalic, without obvious abnormality, atraumatic   Eyes:    PERRL, conjunctiva/corneas clear, EOM's intact, both eyes   Ears:    Normal external ear canals, both ears   Nose:   Nares normal, septum midline, mucosa normal, no drainage    or sinus tenderness   Throat:   Lips, tongue, " gums normal; oral mucosa pink and moist   Neck: Supple and no adenopathy   Back:     Symmetric, no curvature, ROM normal, no CVA tenderness   Lungs:     Clear to auscultation bilaterally, respirations unlabored   Chest Wall:    No tenderness or deformity    Heart:  S1-S2 regular    Abdomen:   Soft nontender   Extremities: Left wrist warm with left leg lateral medial incision.  Capillary refill improved.   Pulses:   Pulses palpable in all extremities; symmetric all extremities   Skin: No rash noted   Neurologic: Somnolent but arousable grossly nonfocal examination       Data Review:      I reviewed the patient's new clinical results.  Results from last 7 days   Lab Units 06/11/24  1354   WBC 10*3/mm3 8.13   HEMOGLOBIN g/dL 14.7   PLATELETS 10*3/mm3 246     Results from last 7 days   Lab Units 06/11/24  1354   SODIUM mmol/L 136   POTASSIUM mmol/L 3.6   CHLORIDE mmol/L 103   CO2 mmol/L 21.5*   BUN mg/dL 13   CREATININE mg/dL 1.28*   CALCIUM mg/dL 9.5   GLUCOSE mg/dL 119*     CT Angio Abdominal Aorta Bilateral Iliofem Runoff    Result Date: 6/11/2024   1. Complete occlusion of the mid left popliteal artery with minimal short segment reconstitution of the posterior tibialis and peroneal arteries at the mid to distal calf and minimal contrast enhancement of the medial and lateral plantar arteries at the level of the midfoot and forefoot. 2. Nonocclusive thrombus in the left common iliac artery. 3. Calcified atherosclerotic disease at the origin of the right renal artery resulting in less than 50% stenosis. 4. Three-vessel runoff to the right ankle.  Results were discussed over the phone with the ordering ER clinician, Dr. Gilbert Wright, at 3:15 p.m. on 6/11/2024  This report was finalized on 6/11/2024 3:20 PM by Luis Manuel Lehman MD on Workstation: [id number]     Telemetry Scan   Final Result          Microbiology Results (last 10 days)       ** No results found for the last 240 hours. **            Assessment:  Active  Hospital Problems    Diagnosis  POA    **Arterial occlusion, lower extremity [I70.209]  Yes    Asthma [J45.909]  Yes    History of pulmonary embolism [Z86.711]  Yes    History of anxiety state [Z91.89]  Yes    Memory loss [R41.3]  Yes    Mixed anxiety and depressive disorder [F41.8]  Yes    Hypertension [I10]  Yes    Stage 3 chronic kidney disease [N18.30]  Yes       Medical decision making/care plan: See admitting orders  Acute on chronic left lower extremity ischemia-status post emergent revascularization-continue with IV heparin aspirin and statin with close vascular surgery follow-up.  History of DVT and pulmonary embolism and prior CVA-continue aspirin and statin and fenofibrate and hold her Xarelto while she is receiving IV heparin per protocol  History of anxiety and underlying dementia and memory issues-puts her at high risk of sundowning-monitor her mental status provide her with continuous pulse ox monitoring and watch for sundowning.  Hypertension-continue antihypertensive regimen and avoid hypotensive episodes.  Chronic kidney disease-monitor her renal function as she has received IV contrast and avoid hypotensive episodes and nephrotoxic agents.  Asthma-continue with as needed nebulizer treatment.  Tana Reyes MD   6/11/2024  16:53 EDT    Parts of this note may be an electronic transcription/translation of spoken language to printed text using the Dragon dictation system.

## 2024-06-11 NOTE — ANESTHESIA PROCEDURE NOTES
Arterial Line      Patient reassessed immediately prior to procedure    Patient location during procedure: holding area  Start time: 6/11/2024 4:30 PM  Stop Time:6/11/2024 4:55 PM       Line placed for hemodynamic monitoring.  Performed By   Anesthesiologist: Barb Martínez MD   Preanesthetic Checklist  Completed: patient identified, IV checked, site marked, risks and benefits discussed, monitors and equipment checked and pre-op evaluation  Arterial Line Prep    Sterile Tech: gloves  Prep: ChloraPrep  Patient monitoring: blood pressure monitoring, continuous pulse oximetry and EKG  Arterial Line Procedure   Laterality:left  Location:  radial artery  Catheter size: 20 G   Guidance: ultrasound guided  PROCEDURE NOTE/ULTRASOUND INTERPRETATION.  Using ultrasound guidance the potential vascular sites for insertion of the catheter were visualized to determine the patency of the vessel to be used for vascular access.  After selecting the appropriate site for insertion, the needle was visualized under ultrasound being inserted into the radial artery, followed by ultrasound confirmation of wire and catheter placement. There were no abnormalities seen on ultrasound; an image was taken; and the patient tolerated the procedure with no complications.   Number of attempts: 2  Successful placement: yes Images: still images obtained, printed/placed on the chart  Post Assessment   Dressing Type: occlusive dressing applied.   Complications no  Circ/Move/Sens Assessment: normal.   Patient Tolerance: patient tolerated the procedure well with no apparent complications

## 2024-06-11 NOTE — ED NOTES
Patient to ER via car from home for left pain and foot pain patient denies injury denies being diabetic

## 2024-06-11 NOTE — PERIOPERATIVE NURSING NOTE
Emergent patient received from the ER. Emily SHEIKH placed at bedside per anesthesia. Sent to OR with heparin gtt running.

## 2024-06-11 NOTE — ANESTHESIA PROCEDURE NOTES
Airway  Urgency: elective    Date/Time: 6/11/2024 5:12 PM    General Information and Staff    Patient location during procedure: OR  Anesthesiologist: Render, Ross Ray, MD    Indications and Patient Condition  Indications for airway management: airway protection    Preoxygenated: yes  MILS maintained throughout  Mask difficulty assessment: 1 - vent by mask    Final Airway Details  Final airway type: endotracheal airway      Successful airway: ETT  Cuffed: yes   Successful intubation technique: direct laryngoscopy  Blade: Shelly  Blade size: 3  ETT size (mm): 7.0  Cormack-Lehane Classification: grade I - full view of glottis  Placement verified by: chest auscultation and capnometry   Inital cuff pressure (cm H2O): 20  Measured from: lips  Number of attempts at approach: 1

## 2024-06-11 NOTE — OP NOTE
Date of Admission:  6/11/2024  Today's Date:  06/11/24  Clifton Guy MD  [facility]    Preoperative Diagnosis:   Acute left lower extremity ischemia.    Postoperative Diagnosis:   Acute on chronic left lower extremity ischemia.    Procedure Performed:   Left femoral base embolectomy of the common iliac artery, external iliac artery, superficial femoral artery, and popliteal artery.  Left leg runoff arteriogram.  Left anterior tibial artery embolectomy via separate incision at ankle.  Left posterior tibial artery embolectomy via separate incision at ankle.    Surgeon:   Clifton Guy MD    Assistant:    Saloni SANCHES, Provided critical assistance in exposure, retraction, and suction that overall decrease blood loss and operative time.    Anesthesia:   General    Estimated Blood Loss:   100-250 cc    Findings:    Well matured subacute thrombus removed from the iliac artery with normal pulsatile flow noted.  Distal thrombectomy via femoral incision removed small amount of thrombus.  Unable to pass Anjel beyond 50 cm.  Attempted over-the-wire embolectomy performed with minimal clot returned.    Tibial embolectomies x 2 via incisions over the posterior and anterior tibial artery at the ankle removed a large amount of acute and subacute thrombus.  Pulsatile inflow bleeding noted.  Pulsatile flow noted at the ankle x 2.  Waterhammer signals in the posterior and anterior tibial artery despite distal/pedal thrombectomies.    Unclear viability of the foot.      Implants:    Implant Name Type Inv. Item Serial No.  Lot No. LRB No. Used Action   CLIPAPPLR M/ ENDO LIGACLIP9 3/8IN MD - [id number] Implant CLIPAPPLR M/ ENDO LIGACLIP9 3/8IN MD  [employer]  [employer] J [employer] J [id number] Left 1 Implanted   CLIPAPPLR M/ ENDO LIGACLIP 9 3/8IN  - [id number] Implant CLIPAPPLR M/ ENDO LIGACLIP 9 3/8IN   [employer]  [employer] J [employer] J [id number] Left 1 Implanted   KT SEAL HEMOS ABS FLOSEAL MATRX  1.5/FAST/PREP 5000/IU 10ML - [id number] Implant KT SEAL HEMOS ABS FLOSEAL MATRX 1.5/FAST/PREP 5000/IU 10ML  [facility] [id number] Left 1 Implanted       Staff:   Circulator: Tory Whelan RN; Bhavya Razo RN  Radiology Technologist: Vickie Reilly  Scrub Person: Romi Harrington  Assistant: Saloni Dunn CSA  Vascular Radiology Technician: Sterling Boswell    Specimen:   none    Complications:   none    Dispo:   to PACU    Indication for procedure:   68 y.o. female with patient with acute onset left lower extremity ischemia.  CT angiogram shows multilevel thrombosis.  She was counseled on the risk benefits alternatives to undergoing emergent thrombectomy.  Informed consent obtained.    Description of procedure:   The patient was taken to the operating room.  Anesthesia was induced without difficulty.  Surgical sites prepped and draped in usual sterile manner.  A full surgical timeout was performed.  Left oblique femoral incision was made.  Bovie electrocautery was used to dissect down to the common femoral artery at the bifurcation.  Vessels were ensnared with vessel loops.  Heparin was administered and after 5 minutes clamps were applied.  Arteriotomy was made in the distal common femoral artery.  A #4 Henry embolectomy catheter was used proximally to retrieve a large amount of iliac thrombus.  Normal pulsatile end bleeding was noted.  Heparin and saline were used to flush the iliac artery.  Distally #4 and #3 Anjel's were used to perform distal embolectomies.  Ultimately a small amount of thrombus was retrieved.  Back bleeding was large in the left.  Catheters were placed down into the superficial femoral artery and angiogram performed that showed acute popliteal cutoff with minimal reconstitution.  Using over-the-wire Anjel embolectomy techniques, the anterior posterior tibial artery was selected and embolectomies were performed.  This pulled some thrombus out but failed to  change anything on angiographic flow.  The artery was closed.  Multi-side hole catheter was accessed into the common femoral artery and iliac angiogram and left lower extremity runoff arteriogram was performed.  Iliac angiogram failed to demonstrate any thrombosis or defects at the iliac clot site.  Runoff arteriogram showed widely patent common deep superficial femoral artery and popliteal artery with acute cutoff in the distal popliteal artery.  There was some collateral flow noted but no reconstitution of any distal vessels was performed.  Flush catheter was removed and arteriotomy primarily closed.  Separate incisions were made over the distal posterior tibial artery and anterior tibial artery and #2 Anjel embolectomies were performed.  Significant thrombotic material was retrieved.  Pulsatile end bleeding noted bilaterally, no back bleeding was performed despite removal of significant clot burden in the pedal vessels.  Arteriotomies were closed with 7-0 Prolene in interrupted manner.  At this point I felt the patient was maximally revascularized.  I think she has significant intrinsic foot disease due to microvascular collapse from acute on chronic atheroembolization from her lesion.  She did have pulses in the posterior tibial artery and anterior tibial artery, but minimal flow on Doppler, more of a water hammer type signal.  At this point I elected not to reverse heparin.  Wounds were all irrigated.  Floseal was applied.  Pedal and tibial incisions were closed with 3-0 Prolene in vertical mattress manner.  Femoral base incision was closed with 2-0 and 3-0 Vicryl and stapled closed.  Overall, the patient tolerated the procedure well.      Viability of the foot remains questionable.        At case completion all instrument count, needle count, and sponge counts were correct x2.    Clifton Guy MD  06/11/24     Active Hospital Problems    Diagnosis  POA    **Arterial occlusion, lower extremity [I70.209]   Yes    Asthma [J45.909]  Yes    History of pulmonary embolism [Z86.711]  Yes    History of anxiety state [Z91.89]  Yes    Memory loss [R41.3]  Yes    Mixed anxiety and depressive disorder [F41.8]  Yes    Hypertension [I10]  Yes    Stage 3 chronic kidney disease [N18.30]  Yes      Resolved Hospital Problems   No resolved problems to display.

## 2024-06-11 NOTE — CONSULTS
Chp made hospitality visit, family at bedside; Pt requested prayer. Chp prayed with pt/family. Thanks expressed.    Chaplains remain available.

## 2024-06-11 NOTE — CONSULTS
[facility]   Consult Note    Patient Name: Erin Jones  : 1955  MRN: [id number]  Primary Care Physician:  Laurie De La Rosa APRN  Referring Physician: No ref. provider found  Date of admission: 2024    Inpatient Vascular Surgery Consult  Consult performed by: Clifton Guy MD  Consult ordered by: Gilbert Wright MD        Subjective   Subjective     Reason for Consult/ Chief Complaint: Decreased perfusion to the left lower extremity.    History of Present Illness  History generated from review of chart and verification of findings at bedside with patient and .  Erin Jones is a 68 y.o. female HTN, HLD, Alzheimer's, CKD, PE who presents to the ED c/o acute left lower extremity pain.  Patient states pain is located in her left foot and ankle.  Patient has no traumatic injury.  Patient states she has had severe difficulty with ambulation has been having to use a chair to get around due to the pain in her foot.  Patient does have a history of PE.  Patient does not appear to be on any blood thinners at this time.  Patient does have history of Alzheimer's.     Review of Systems     Personal History     Past Medical History:   Diagnosis Date   • Asthma 2022   • Dementia 2020   • Hyperlipidemia 2015   • Hypertension 2015   • Memory loss 2019   • Mixed anxiety and depressive disorder 12/3/2018   • Obesity (BMI 30-39.9) 2022   • Stage 3 chronic kidney disease 2015       History reviewed. No pertinent surgical history.    Family History: Her Family history is unknown by patient.     Social History: She  reports that she has never smoked. She has never used smokeless tobacco. She reports that she does not currently use alcohol. She reports that she does not currently use drugs.    Home Medications:  Aspirin Buf(CaCarb-MgCarb-MgO), Vitamin B-12, albuterol sulfate HFA, atorvastatin, budesonide-formoterol, cetirizine, donepezil, fenofibrate, folic acid,  memantine, rivaroxaban, and vitamin B-6    Allergies:  She has No Known Allergies.    Objective    Objective     Vitals:    Temp:  [97.7 °F (36.5 °C)] 97.7 °F (36.5 °C)  Heart Rate:  [81-89] 81  Resp:  [16-18] 18  BP: (143-146)/(93-95) 146/93    Physical Exam  Constitutional:       Appearance: She is well-developed.   Pulmonary:      Effort: Pulmonary effort is normal. No respiratory distress.   Abdominal:      General: There is no distension.      Palpations: Abdomen is soft.   Neurological:      Mental Status: She is alert and oriented to person, place, and time.     Palpable right dorsalis pedis artery pulse.  No perfusion to the left foot with Doppler or palpation.  Motor deficit at the digital and ankle level.    Result Review    Result Review:  I have personally reviewed the results from the time of this admission to 6/11/2024 16:07 EDT and agree with these findings:  [x]  Laboratory list / accordion  []  Microbiology  [x]  Radiology  []  EKG/Telemetry   []  Cardiology/Vascular   []  Pathology  []  Old records  []  Other:    Assessment & Plan   Assessment / Plan     Brief Patient Summary:  Erin Jones is a 68 y.o. female acute left lower extremity arterial ischemia.  Has some sensory and motor deficit at the foot.  Calf is mildly tender both medially and laterally.  CT angiogram reviewed.  Popliteal artery occlusion.  Also not on occluded thrombus in the common iliac artery on the left side.  Unclear exact etiology of this is a ruptured iliac plaque that through thrombus to the popliteal or if this is some sort of central thromboembolic event.  Patient  states that she supposed to be on anticoagulation but has been off it since November.    Active Hospital Problems:  There are no active hospital problems to display for this patient.    Plan:   6/11/2024: To operating room for emergent left femoral embolectomy with possible 4 compartment fasciotomies.    Patient has been recommended for emergent  lower extremity surgical revascularization.  This is a major surgery.  Patient understands the inherent risks associated with lower extremity surgical revascularization .  These include but not are not limited to stroke, heart attack, bleeding, infection, need for secondary surgery/intervention, progression of arterial disease requiring amputation, and even death.  Patient also understands the nature of peripheral arterial disease and if a left untreated surgically would put them at increased risk for worsening ischemia, infection, pain, and possibility for amputation.    VTE Prophylaxis:  Pharmacologic VTE prophylaxis orders are present.         Clifton Guy MD

## 2024-06-11 NOTE — ED PROVIDER NOTES
EMERGENCY DEPARTMENT ENCOUNTER  Room Number:  [facility] OR/MAIN OR  PCP: Laurie De La Rosa APRN  Independent Historians: Patient and Family      HPI:  Chief Complaint: had concerns including Leg Pain.     Context: Erin Jones is a 68 y.o. female with a medical history of HTN, HLD, Alzheimer's, CKD, PE who presents to the ED c/o acute left lower extremity pain.  Patient states pain is located in her left foot and ankle.  Patient has no traumatic injury.  Patient states she has had severe difficulty with ambulation has been having to use a chair to get around due to the pain in her foot.  Patient does have a history of PE.  Patient does not appear to be on any blood thinners at this time.  Patient does have history of Alzheimer's.      Review of prior external notes (non-ED) -and- Review of prior external test results outside of this encounter: Office visit with primary care from 9/12/2023 reviewed and notable for history of hypertension, dementia, hyperlipidemia, cerebrovascular disease.  Plan at that time was to continue cetirizine, obtain UA and urine culture, A1c.    PAST MEDICAL HISTORY  Active Ambulatory Problems     Diagnosis Date Noted    Moderate late onset Alzheimer's dementia with anxiety 08/20/2020    Asthma 01/27/2022    Chest pain 01/27/2022    History of anxiety state 04/29/2019    Hyperlipidemia 09/21/2015    Hypertension 09/21/2015    Memory loss 04/25/2019    Mixed anxiety and depressive disorder 12/03/2018    Stage 3 chronic kidney disease 09/21/2015    Overweight with body mass index (BMI) 25.0-29.9 06/27/2019    Hypokalemia 01/27/2022    Hypocalcemia 01/27/2022    Obesity (BMI 30-39.9) 01/27/2022    DNR (do not resuscitate) 01/27/2022    History of pulmonary embolism 01/27/2022    Cerebrovascular disease 07/12/2022    Elevated serum homocysteine level 07/12/2022     Resolved Ambulatory Problems     Diagnosis Date Noted    No Resolved Ambulatory Problems     Past Medical History:   Diagnosis  Date    Dementia 8/20/2020         PAST SURGICAL HISTORY  History reviewed. No pertinent surgical history.      FAMILY HISTORY  Family History   Family history unknown: Yes         SOCIAL HISTORY  Social History     Socioeconomic History    Marital status:    Tobacco Use    Smoking status: Never    Smokeless tobacco: Never   Vaping Use    Vaping status: Never Used   Substance and Sexual Activity    Alcohol use: Not Currently    Drug use: Not Currently         ALLERGIES  Patient has no known allergies.      REVIEW OF SYSTEMS  Review of Systems  Included in HPI  All systems reviewed and negative except for those discussed in HPI.      PHYSICAL EXAM    I have reviewed the triage vital signs and nursing notes.    ED Triage Vitals   Temp Heart Rate Resp BP SpO2   06/11/24 1317 06/11/24 1317 06/11/24 1317 06/11/24 1325 06/11/24 1317   97.7 °F (36.5 °C) 89 16 143/95 96 %      Temp src Heart Rate Source Patient Position BP Location FiO2 (%)   -- -- -- -- --              Physical Exam  GENERAL: alert, no acute distress  SKIN: Warm, dry  HENT: Normocephalic, atraumatic  EYES: no scleral icterus  CV: regular rhythm, regular rate  RESPIRATORY: normal effort, lungs clear  ABDOMEN: soft, nontender, nondistended  MUSCULOSKELETAL: no deformity.  Dusky appearing toes on the left with delayed capillary refill.  Patient has significant tenderness to palpation throughout the foot.  No palpable PT or DP pulses  NEURO: alert, moves all extremities, follows commands            LAB RESULTS  Recent Results (from the past 24 hour(s))   Comprehensive Metabolic Panel    Collection Time: 06/11/24  1:54 PM    Specimen: Blood   Result Value Ref Range    Glucose 119 (H) 65 - 99 mg/dL    BUN 13 8 - 23 mg/dL    Creatinine 1.28 (H) 0.57 - 1.00 mg/dL    Sodium 136 136 - 145 mmol/L    Potassium 3.6 3.5 - 5.2 mmol/L    Chloride 103 98 - 107 mmol/L    CO2 21.5 (L) 22.0 - 29.0 mmol/L    Calcium 9.5 8.6 - 10.5 mg/dL    Total Protein 6.6 6.0 - 8.5  g/dL    Albumin 3.8 3.5 - 5.2 g/dL    ALT (SGPT) 14 1 - 33 U/L    AST (SGOT) 25 1 - 32 U/L    Alkaline Phosphatase 132 (H) 39 - 117 U/L    Total Bilirubin 0.7 0.0 - 1.2 mg/dL    Globulin 2.8 gm/dL    A/G Ratio 1.4 g/dL    BUN/Creatinine Ratio 10.2 7.0 - 25.0    Anion Gap 11.5 5.0 - 15.0 mmol/L    eGFR 45.7 (L) >60.0 mL/min/1.73   aPTT    Collection Time: 06/11/24  1:54 PM    Specimen: Blood   Result Value Ref Range    PTT 26.5 22.7 - 35.4 seconds   Protime-INR    Collection Time: 06/11/24  1:54 PM    Specimen: Blood   Result Value Ref Range    Protime 13.6 11.7 - 14.2 Seconds    INR 1.02 0.90 - 1.10   CBC Auto Differential    Collection Time: 06/11/24  1:54 PM    Specimen: Blood   Result Value Ref Range    WBC 8.13 3.40 - 10.80 10*3/mm3    RBC 4.83 3.77 - 5.28 10*6/mm3    Hemoglobin 14.7 12.0 - 15.9 g/dL    Hematocrit 42.4 34.0 - 46.6 %    MCV 87.8 79.0 - 97.0 fL    MCH 30.4 26.6 - 33.0 pg    MCHC 34.7 31.5 - 35.7 g/dL    RDW 13.1 12.3 - 15.4 %    RDW-SD 41.5 37.0 - 54.0 fl    MPV 10.2 6.0 - 12.0 fL    Platelets 246 140 - 450 10*3/mm3    Neutrophil % 68.6 42.7 - 76.0 %    Lymphocyte % 19.2 (L) 19.6 - 45.3 %    Monocyte % 7.0 5.0 - 12.0 %    Eosinophil % 3.3 0.3 - 6.2 %    Basophil % 0.9 0.0 - 1.5 %    Immature Grans % 1.0 (H) 0.0 - 0.5 %    Neutrophils, Absolute 5.58 1.70 - 7.00 10*3/mm3    Lymphocytes, Absolute 1.56 0.70 - 3.10 10*3/mm3    Monocytes, Absolute 0.57 0.10 - 0.90 10*3/mm3    Eosinophils, Absolute 0.27 0.00 - 0.40 10*3/mm3    Basophils, Absolute 0.07 0.00 - 0.20 10*3/mm3    Immature Grans, Absolute 0.08 (H) 0.00 - 0.05 10*3/mm3    nRBC 0.0 0.0 - 0.2 /100 WBC         RADIOLOGY  CT Angio Abdominal Aorta Bilateral Iliofem Runoff    Result Date: 6/11/2024  CT ANGIO ABDOMINAL AORTA BILAT ILIOFEM RUNOFF-  DATE OF EXAM: 6/11/2024 1:55 PM  INDICATION: Left lower extremity pale and discolored with concern for peripheral arterial disease.  COMPARISON: None available.  TECHNIQUE: Multiple contiguous axial images  were acquired through the abdomen, pelvis, and each lower extremity following the intravenous administration of 100 mL of Isovue-370. Reformatted coronal and sagittal sequences and a 3D reconstruction image were also reviewed. Radiation dose reduction techniques were utilized, including automated exposure control and exposure modulation based on body size.  FINDINGS: The abdominal aorta is normal in size. Mild calcified and noncalcified atherosclerotic disease in the distal abdominal aorta. The celiac axis, SMA, left renal artery, and JEFFERSON are widely patent. Mild calcified atherosclerotic disease at the origin of the right renal artery resulting in less than 50% stenosis of the origin of the right renal artery. Nonocclusive thrombus in the left common iliac artery. The right common iliac artery is widely patent. The bilateral external and internal iliac arteries, common femoral arteries, superficial femoral arteries, and deep femoral arteries are widely patent. Occlusion of the left popliteal artery with minimal short segment reconstitution of the posterior tibialis and peroneal arteries at the level of the mid to distal lower leg and minimal contrast enhancement of the diminutive medial and lateral plantar arteries at the level of the left midfoot and forefoot. The right popliteal artery is widely patent with three-vessel runoff to the ankle.  Mild multifocal bibasilar subsegmental atelectasis and/or scarring. Trace pericardial fluid.  The liver, gallbladder, spleen, pancreas, and adrenal glands are unremarkable. Simple appearing 1 cm cyst at the upper pole of the right kidney. Additional tiny subcentimeter cysts in both kidneys. Focal scarring at the lower pole of the left kidney. The urinary bladder is nondistended. Urinary bladder wall thickening is likely accentuated by under distention. Status post hysterectomy. The adnexa are unremarkable.  Tiny hiatal hernia. Mild colorectal stool. Circumferential  low-attenuation thickening of the wall of the cecum, ascending colon, and proximal transverse colon could reflect sequela of chronic inflammatory bowel disease. Colonic diverticula, without CT evidence of diverticulitis. No bowel obstruction. The appendix is normal.  No free fluid in the abdomen or pelvis. No free intraperitoneal air. No pathologically enlarged lymph nodes in the abdomen or pelvis.  Mild lumbar levoscoliosis and mild to moderate multilevel lumbar spondylosis. Mild to moderate bilateral hip and SI joint DJD and mild to moderate DJD of the pubic symphysis. Severe DJD at the patellofemoral compartment of each knee. Sclerotic foci in the distal right femur, probably benign bone islands. Mild to moderate chondromalacia/DJD in the medial and lateral compartments of each knee. Subchondral cystic changes in the medial femoral condyle, likely related to overlying chondromalacia/DJD. Mild to moderate DJD at each tibiotalar joint. Moderate to severe multifocal DJD at each midfoot. Each midfoot is fairly well aligned. Moderate to severe DJD at the great toe MTP joint. No evidence of a significant joint effusion. No discrete intra-articular body is identified. No significant muscular fatty atrophy.       1. Complete occlusion of the mid left popliteal artery with minimal short segment reconstitution of the posterior tibialis and peroneal arteries at the mid to distal calf and minimal contrast enhancement of the medial and lateral plantar arteries at the level of the midfoot and forefoot. 2. Nonocclusive thrombus in the left common iliac artery. 3. Calcified atherosclerotic disease at the origin of the right renal artery resulting in less than 50% stenosis. 4. Three-vessel runoff to the right ankle.  Results were discussed over the phone with the ordering ER clinician, Dr. Gilbert Wright, at 3:15 p.m. on 6/11/2024  This report was finalized on 6/11/2024 3:20 PM by Luis Manuel Lehman MD on Workstation: [id number]          MEDICATIONS GIVEN IN ER  Medications   heparin 28235 units/250 mL (100 units/mL) in 0.45 % NaCl infusion (11 Units/kg/hr × 90.7 kg Intravenous Currently Infusing 6/11/24 1547)   heparin (porcine) 5000 UNIT/ML injection 2,700-4,000 Units ( Intravenous MAR Hold 6/11/24 1616)   iopamidol (ISOVUE-370) 76 % injection 100 mL (100 mL Intravenous Given by Other 6/11/24 1410)   morphine injection 4 mg (4 mg Intravenous Given 6/11/24 1540)   heparin (porcine) 5000 UNIT/ML injection 4,000 Units (4,000 Units Intravenous Given 6/11/24 1542)         ORDERS PLACED DURING THIS VISIT:  Orders Placed This Encounter   Procedures    CT Angio Abdominal Aorta Bilateral Iliofem Runoff    Arteriogram (Autofinalize)    Comprehensive Metabolic Panel    aPTT    Protime-INR    CBC Auto Differential    aPTT    aPTT    Adjust Heparin Rate Based on aPTT Using Nomogram    Verify All Anticoagulant Orders Are Discontinued Upon Initiation of Heparin Protocol (eg Enoxaparin, Fondaparinux, Apixaban, Dabigatran, Edoxaban, or Rivaroxaban)    RN To Release aPTT Order 6 Hours After Heparin Bolus & 6 Hours After Any Heparin Rate Change    Obtain Informed Consent    Vascular Surgery Consult    [facility] (on-call MD unless specified) Details    Type & Screen    Inpatient Admission    CBC & Differential    CBC & Differential         OUTPATIENT MEDICATION MANAGEMENT:  Current Facility-Administered Medications Ordered in Epic   Medication Dose Route Frequency Provider Last Rate Last Admin    [Transfer Hold] heparin (porcine) 5000 UNIT/ML injection 2,700-4,000 Units  30-44.1 Units/kg Intravenous Q6H PRN Gilbert Wright MD        heparin 03853 units/250 mL (100 units/mL) in 0.45 % NaCl infusion  11 Units/kg/hr Intravenous Titrated Gilbert Wright MD 9.97 mL/hr at 06/11/24 1547 11 Units/kg/hr at 06/11/24 1547     No current [facility]-ordered outpatient medications on file.         PROCEDURES  Procedures      Critical care provider statement:    Critical care time  (minutes): 39.   Critical care time was exclusive of:  Separately billable procedures and treating other patients   Critical care was necessary to treat or prevent imminent or life-threatening deterioration of the following conditions:  Circulatory Failure   Critical care was time spent personally by me on the following activities:  Development of treatment plan with patient or surrogate, discussions with consultants, evaluation of patient's response to treatment, examination of patient, obtaining history from patient or surrogate, ordering and performing treatments and interventions, ordering and review of laboratory studies, ordering and review of radiographic studies, pulse oximetry, re-evaluation of patient's condition and review of old charts. Critical Care indicators:  Others: aminophylline, diazepam, glucagon, heparin, lovenox, morphine, sodium bicarbonate, et al.      PROGRESS, DATA ANALYSIS, CONSULTS, AND MEDICAL DECISION MAKING  All labs have been independently interpreted by me.  All radiology studies have been reviewed by me. All EKG's have been independently viewed and interpreted by me.  Discussion below represents my analysis of pertinent findings related to patient's condition, differential diagnosis, treatment plan and final disposition.    Differential diagnosis includes but is not limited to peripheral vascular disease, DVT, arterial occlusion.    Clinical Scores:                   ED Course as of 06/11/24 1618   Tue Jun 11, 2024   1420 CT abdomen and pelvis interpreted by me and demonstrates no evidence of bowel obstruction. [MW]   1538 Discussed with Dr. Guy of vascular surgery who agrees with initiation of heparin and will plan on operative intervention today. [MW]   1618 Discussed with Dr. Reyes who agrees to admit following operative intervention [MW]      ED Course User Index  [MW] Gilbert Wright MD             AS OF 16:18 EDT VITALS:    BP - 146/93  HR - 81  TEMP - 97.7 °F (36.5  °C)  O2 SATS - 98%    COMPLEXITY OF CARE  The patient requires admission.      DIAGNOSIS  Final diagnoses:   Ischemic leg         DISPOSITION  ED Disposition       ED Disposition   Decision to Admit    Condition   --    Comment   --                Please note that portions of this document were completed with a voice recognition program.    Note Disclaimer: At [facility], we believe that sharing information builds trust and better relationships. You are receiving this note because you recently visited [facility]. It is possible you will see health information before a provider has talked with you about it. This kind of information can be easy to misunderstand. To help you fully understand what it means for your health, we urge you to discuss this note with your provider.         Gilbert Wright MD  06/11/24 1875

## 2024-06-11 NOTE — ANESTHESIA PREPROCEDURE EVALUATION
Anesthesia Evaluation                  Airway   Mallampati: II  TM distance: >3 FB  Neck ROM: full  Dental - normal exam     Pulmonary    (+) asthma,  (-) wheezes  Cardiovascular     ECG reviewed  Rhythm: regular    (+) hypertension, PVD, hyperlipidemia  (-) murmur      Neuro/Psych  GI/Hepatic/Renal/Endo    (+) renal disease-    ROS Comment: GFR 45    Musculoskeletal     Abdominal    Substance History      OB/GYN          Other                    Anesthesia Plan    ASA 4 - emergent     general     (  D/W R&B of GA including but not limited to: heart, lung, liver, kidney, neurologic problems, positioning injuries, dental damage, corneal abrasion and TMJ.  .)  intravenous induction     Anesthetic plan, risks, benefits, and alternatives have been provided, discussed and informed consent has been obtained with: patient.    CODE STATUS:    Code Status (Patient has no pulse and is not breathing): CPR (Attempt to Resuscitate)  Medical Interventions (Patient has pulse or is breathing): Full Support

## 2024-06-12 LAB
ACT BLD: 165 SECONDS (ref 82–152)
ACT BLD: 244 SECONDS (ref 82–152)
ACT BLD: 262 SECONDS (ref 82–152)
ACT BLD: 330 SECONDS (ref 82–152)
ALBUMIN SERPL-MCNC: 3.5 G/DL (ref 3.5–5.2)
ALBUMIN/GLOB SERPL: 1.5 G/DL
ALP SERPL-CCNC: 109 U/L (ref 39–117)
ALT SERPL W P-5'-P-CCNC: 12 U/L (ref 1–33)
ANION GAP SERPL CALCULATED.3IONS-SCNC: 10 MMOL/L (ref 5–15)
APTT PPP: 48.6 SECONDS (ref 22.7–35.4)
APTT PPP: 49.5 SECONDS (ref 22.7–35.4)
APTT PPP: 73.8 SECONDS (ref 22.7–35.4)
AST SERPL-CCNC: 16 U/L (ref 1–32)
BASOPHILS # BLD AUTO: 0.06 10*3/MM3 (ref 0–0.2)
BASOPHILS # BLD AUTO: 0.07 10*3/MM3 (ref 0–0.2)
BASOPHILS NFR BLD AUTO: 0.5 % (ref 0–1.5)
BASOPHILS NFR BLD AUTO: 0.6 % (ref 0–1.5)
BILIRUB SERPL-MCNC: 0.3 MG/DL (ref 0–1.2)
BUN SERPL-MCNC: 12 MG/DL (ref 8–23)
BUN/CREAT SERPL: 11.3 (ref 7–25)
CALCIUM SPEC-SCNC: 8.5 MG/DL (ref 8.6–10.5)
CHLORIDE SERPL-SCNC: 103 MMOL/L (ref 98–107)
CO2 SERPL-SCNC: 21 MMOL/L (ref 22–29)
CREAT SERPL-MCNC: 1.06 MG/DL (ref 0.57–1)
DEPRECATED RDW RBC AUTO: 40.8 FL (ref 37–54)
DEPRECATED RDW RBC AUTO: 42.6 FL (ref 37–54)
EGFRCR SERPLBLD CKD-EPI 2021: 57.3 ML/MIN/1.73
EOSINOPHIL # BLD AUTO: 0.01 10*3/MM3 (ref 0–0.4)
EOSINOPHIL # BLD AUTO: 0.08 10*3/MM3 (ref 0–0.4)
EOSINOPHIL NFR BLD AUTO: 0.1 % (ref 0.3–6.2)
EOSINOPHIL NFR BLD AUTO: 0.7 % (ref 0.3–6.2)
ERYTHROCYTE [DISTWIDTH] IN BLOOD BY AUTOMATED COUNT: 13 % (ref 12.3–15.4)
ERYTHROCYTE [DISTWIDTH] IN BLOOD BY AUTOMATED COUNT: 13.2 % (ref 12.3–15.4)
GLOBULIN UR ELPH-MCNC: 2.4 GM/DL
GLUCOSE SERPL-MCNC: 143 MG/DL (ref 65–99)
HBA1C MFR BLD: 5.4 % (ref 4.8–5.6)
HCT VFR BLD AUTO: 37.1 % (ref 34–46.6)
HCT VFR BLD AUTO: 40.4 % (ref 34–46.6)
HGB BLD-MCNC: 12.9 G/DL (ref 12–15.9)
HGB BLD-MCNC: 14 G/DL (ref 12–15.9)
IMM GRANULOCYTES # BLD AUTO: 0.13 10*3/MM3 (ref 0–0.05)
IMM GRANULOCYTES # BLD AUTO: 0.14 10*3/MM3 (ref 0–0.05)
IMM GRANULOCYTES NFR BLD AUTO: 1.1 % (ref 0–0.5)
IMM GRANULOCYTES NFR BLD AUTO: 1.1 % (ref 0–0.5)
LYMPHOCYTES # BLD AUTO: 1.36 10*3/MM3 (ref 0.7–3.1)
LYMPHOCYTES # BLD AUTO: 1.69 10*3/MM3 (ref 0.7–3.1)
LYMPHOCYTES NFR BLD AUTO: 10.6 % (ref 19.6–45.3)
LYMPHOCYTES NFR BLD AUTO: 14.4 % (ref 19.6–45.3)
MCH RBC QN AUTO: 30.4 PG (ref 26.6–33)
MCH RBC QN AUTO: 30.8 PG (ref 26.6–33)
MCHC RBC AUTO-ENTMCNC: 34.7 G/DL (ref 31.5–35.7)
MCHC RBC AUTO-ENTMCNC: 34.8 G/DL (ref 31.5–35.7)
MCV RBC AUTO: 87.3 FL (ref 79–97)
MCV RBC AUTO: 88.8 FL (ref 79–97)
MONOCYTES # BLD AUTO: 0.8 10*3/MM3 (ref 0.1–0.9)
MONOCYTES # BLD AUTO: 0.96 10*3/MM3 (ref 0.1–0.9)
MONOCYTES NFR BLD AUTO: 6.2 % (ref 5–12)
MONOCYTES NFR BLD AUTO: 8.2 % (ref 5–12)
NEUTROPHILS NFR BLD AUTO: 10.52 10*3/MM3 (ref 1.7–7)
NEUTROPHILS NFR BLD AUTO: 75 % (ref 42.7–76)
NEUTROPHILS NFR BLD AUTO: 8.82 10*3/MM3 (ref 1.7–7)
NEUTROPHILS NFR BLD AUTO: 81.5 % (ref 42.7–76)
NRBC BLD AUTO-RTO: 0 /100 WBC (ref 0–0.2)
NRBC BLD AUTO-RTO: 0 /100 WBC (ref 0–0.2)
PLATELET # BLD AUTO: 202 10*3/MM3 (ref 140–450)
PLATELET # BLD AUTO: 249 10*3/MM3 (ref 140–450)
PMV BLD AUTO: 10.1 FL (ref 6–12)
PMV BLD AUTO: 10.2 FL (ref 6–12)
POTASSIUM SERPL-SCNC: 4 MMOL/L (ref 3.5–5.2)
PROT SERPL-MCNC: 5.9 G/DL (ref 6–8.5)
RBC # BLD AUTO: 4.25 10*6/MM3 (ref 3.77–5.28)
RBC # BLD AUTO: 4.55 10*6/MM3 (ref 3.77–5.28)
SODIUM SERPL-SCNC: 134 MMOL/L (ref 136–145)
WBC NRBC COR # BLD AUTO: 11.75 10*3/MM3 (ref 3.4–10.8)
WBC NRBC COR # BLD AUTO: 12.89 10*3/MM3 (ref 3.4–10.8)

## 2024-06-12 PROCEDURE — 25010000002 MORPHINE PER 10 MG: Performed by: SURGERY

## 2024-06-12 PROCEDURE — 94640 AIRWAY INHALATION TREATMENT: CPT

## 2024-06-12 PROCEDURE — 83036 HEMOGLOBIN GLYCOSYLATED A1C: CPT | Performed by: SURGERY

## 2024-06-12 PROCEDURE — 80053 COMPREHEN METABOLIC PANEL: CPT | Performed by: SURGERY

## 2024-06-12 PROCEDURE — 94664 DEMO&/EVAL PT USE INHALER: CPT

## 2024-06-12 PROCEDURE — 85025 COMPLETE CBC W/AUTO DIFF WBC: CPT | Performed by: INTERNAL MEDICINE

## 2024-06-12 PROCEDURE — 99024 POSTOP FOLLOW-UP VISIT: CPT | Performed by: SURGERY

## 2024-06-12 PROCEDURE — 85730 THROMBOPLASTIN TIME PARTIAL: CPT | Performed by: SURGERY

## 2024-06-12 PROCEDURE — 25010000002 HEPARIN (PORCINE) 25000-0.45 UT/250ML-% SOLUTION: Performed by: SURGERY

## 2024-06-12 PROCEDURE — 94799 UNLISTED PULMONARY SVC/PX: CPT

## 2024-06-12 RX ORDER — OLANZAPINE 5 MG/1
5 TABLET ORAL ONCE
Status: COMPLETED | OUTPATIENT
Start: 2024-06-12 | End: 2024-06-12

## 2024-06-12 RX ORDER — HYDROCODONE BITARTRATE AND ACETAMINOPHEN 5; 325 MG/1; MG/1
1 TABLET ORAL EVERY 6 HOURS PRN
Status: DISCONTINUED | OUTPATIENT
Start: 2024-06-12 | End: 2024-06-15 | Stop reason: HOSPADM

## 2024-06-12 RX ADMIN — OLANZAPINE 5 MG: 5 TABLET, FILM COATED ORAL at 22:49

## 2024-06-12 RX ADMIN — Medication 100 MG: at 08:20

## 2024-06-12 RX ADMIN — BUDESONIDE AND FORMOTEROL FUMARATE DIHYDRATE 2 PUFF: 160; 4.5 AEROSOL RESPIRATORY (INHALATION) at 19:45

## 2024-06-12 RX ADMIN — Medication 10 ML: at 22:50

## 2024-06-12 RX ADMIN — MORPHINE SULFATE 4 MG: 2 INJECTION, SOLUTION INTRAMUSCULAR; INTRAVENOUS at 18:06

## 2024-06-12 RX ADMIN — MEMANTINE HYDROCHLORIDE 10 MG: 10 TABLET, FILM COATED ORAL at 08:20

## 2024-06-12 RX ADMIN — MEMANTINE HYDROCHLORIDE 10 MG: 10 TABLET, FILM COATED ORAL at 22:42

## 2024-06-12 RX ADMIN — CETIRIZINE HYDROCHLORIDE 10 MG: 10 TABLET ORAL at 08:20

## 2024-06-12 RX ADMIN — HYDROCODONE BITARTRATE AND ACETAMINOPHEN 1 TABLET: 5; 325 TABLET ORAL at 13:08

## 2024-06-12 RX ADMIN — HEPARIN SODIUM 13 UNITS/KG/HR: 10000 INJECTION, SOLUTION INTRAVENOUS at 21:45

## 2024-06-12 RX ADMIN — DONEPEZIL HYDROCHLORIDE 10 MG: 10 TABLET, FILM COATED ORAL at 22:42

## 2024-06-12 RX ADMIN — HYDROCODONE BITARTRATE AND ACETAMINOPHEN 1 TABLET: 5; 325 TABLET ORAL at 22:42

## 2024-06-12 RX ADMIN — ASPIRIN 81 MG: 81 TABLET, COATED ORAL at 08:20

## 2024-06-12 RX ADMIN — MORPHINE SULFATE 4 MG: 2 INJECTION, SOLUTION INTRAMUSCULAR; INTRAVENOUS at 09:38

## 2024-06-12 RX ADMIN — FOLIC ACID 1000 MCG: 1 TABLET ORAL at 08:20

## 2024-06-12 RX ADMIN — MORPHINE SULFATE 4 MG: 2 INJECTION, SOLUTION INTRAMUSCULAR; INTRAVENOUS at 05:22

## 2024-06-12 RX ADMIN — ATORVASTATIN CALCIUM 10 MG: 10 TABLET, FILM COATED ORAL at 08:20

## 2024-06-12 NOTE — PLAN OF CARE
Goal Outcome Evaluation:   VSS. Mentation varies. A&ox4 on arrival to unit, on second assessment disoriented to time and situation. Per family, baseline. Left foot cool, dusky toes. Debbie dressing to right groin, dressings to ankle incisions. Continues on heparin gtt, recheck at 10AM. Henry cath in place. Call light in reach.

## 2024-06-12 NOTE — PROGRESS NOTES
[facility]   Surgical Progress Note    Patient Name: Erin Jones  : 1955  MRN: [id number]  Date of admission: 2024  Surgical Procedures Since Admission:  Procedure(s):  FEMORAL THROMBECTOMY/EMBOLECTOMY  Surgeon:  Clifton Guy MD  Status:  1 Day Post-Op  -------------------    Subjective   Subjective     Chief Complaint: Peripheral arterial disease with acute cold lower extremity.    History of Present Illness   Patient resting comfortably.  Denies chest pain or shortness of breath.  Decreased motor and sensation to the left foot persist.    Review of Systems    Objective   Objective     Vitals:   Temp:  [97.5 °F (36.4 °C)-98.4 °F (36.9 °C)] 97.6 °F (36.4 °C)  Heart Rate:  [72-89] 75  Resp:  [16-18] 16  BP: (143-178)/(83-98) 145/98  Arterial Line BP: ()/(-27-97) 149/78  Flow (L/min):  [4] 4  Output by Drain (mL) 24 0701 - 24 1900 24 1901 - 24 0700 24 0701 - 24 0746 Range Total   Urethral Catheter Silicone 16 Fr.  600  600       Physical Exam  Constitutional:       Appearance: She is well-developed.   Pulmonary:      Effort: Pulmonary effort is normal. No respiratory distress.   Abdominal:      General: There is no distension.      Palpations: Abdomen is soft.   Neurological:      Mental Status: She is alert and oriented to person, place, and time.     Diminished motor sensation in the left foot with reasonable cap refill in the left great toe.  All compartments of the left calf are soft without tenderness.     Result Review    Result Review:  I have personally reviewed the results from the time of this admission to 2024 07:46 EDT and agree with these findings:  [x]  Laboratory list / accordion  []  Microbiology  []  Radiology  []  EKG/Telemetry   []  Cardiology/Vascular   []  Pathology  []  Old records  []  Other:  Most notable findings include: Hemoglobin 12.9 white count 12.89 creatinine 1.06      Assessment & Plan   Assessment / Plan      Brief Patient Summary:  Erin Jones is a 68 y.o. female who acute on chronic left lower extremity ischemia related to likely a chronically embolizing left iliac thrombus.    Active Hospital Problems:   Active Hospital Problems    Diagnosis    • **Arterial occlusion, lower extremity    • Asthma    • History of pulmonary embolism    • History of anxiety state    • Memory loss    • Mixed anxiety and depressive disorder    • Hypertension    • Stage 3 chronic kidney disease      Plan:   6/11/2024: Open left femoral embolectomy with anterior posterior tibial artery embolectomies.    6/12/2024: Postop day 1.  Reasonable cap refill in the left foot but unable to Doppler signals.  Guarded prognosis about viability long-term of left foot.  All revascularization options have been performed.  Will continue heparin infusion for now and allow foot to declare itself.  Patient and family understand risk of limb loss.    VTE Prophylaxis:  Pharmacologic VTE prophylaxis orders are present.        Clifton Guy MD   Quality 130: Documentation Of Current Medications In The Medical Record: Current Medications Documented Quality 111:Pneumonia Vaccination Status For Older Adults: Pneumococcal Vaccination Previously Received Detail Level: Detailed Quality 110: Preventive Care And Screening: Influenza Immunization: Influenza Immunization Administered during Influenza season

## 2024-06-12 NOTE — PLAN OF CARE
Goal Outcome Evaluation:  Plan of Care Reviewed With: patient        Progress: no change  Outcome Evaluation: Pt disoriented x3. Oriented to self and family/friends. Pt c/o LLE pain/discomfort. PRN PO and IV meds given this shift. PT eval order placed. Pt does not tolerate nursing staff touching left foot. No DP or PT pulse in LLE. Foot cold to touch. Pt did not eat well this shift. Encouraging PO intake. Boderline low UOP. Will continue to provide supportive care.

## 2024-06-12 NOTE — PROGRESS NOTES
Name: Erin Jones ADMIT: 2024   : 1955  PCP: Laurie De La Rosa APRN    MRN: [id number] LOS: 1 days   AGE/SEX: 68 y.o. female  ROOM: [facility]     Subjective   Subjective   Continues with pain in the left lower extremity.  No chest pain.  No shortness of breath.  No palpitation.  No fever or chills.    Review of Systems  GI.  No abdominal pain or nausea or vomiting.  .  No dysuria or hematuria.  CNS.  Positive old memory deficit but no focal neurological symptoms.     Objective   Objective   Vital Signs  Temp:  [97.3 °F (36.3 °C)-98.4 °F (36.9 °C)] 97.3 °F (36.3 °C)  Heart Rate:  [72-84] 84  Resp:  [16-18] 16  BP: (125-178)/(75-98) 140/75  Arterial Line BP: ()/(-27-97) 149/78  SpO2:  [95 %-100 %] 99 %  on  Flow (L/min):  [4] 4;   Device (Oxygen Therapy): room air    Intake/Output Summary (Last 24 hours) at 2024 1541  Last data filed at 2024 0300  Gross per 24 hour   Intake 1000 ml   Output 600 ml   Net 400 ml     Body mass index is 33.05 kg/m².      24  1325 24  2200   Weight: 90.7 kg (200 lb) 90.1 kg (198 lb 10.2 oz)     Physical Exam  General.  Middle-aged female who appears older than stated age.  She is alert and oriented times 2 out of 4 (baseline according to her family).  In no apparent pain/distress/diaphoresis.  Eyes.  Pupils equal round and reactive.  Intact extraocular musculature.  No pallor or jaundice  Oral cavity.  Moist mucous membrane.  Neck.  Supple.  No JVD.  No lymphadenopathy or thyromegaly.  Cardiovascular regular rate and rhythm with no gallops or murmurs  Chest.  Clear to auscultation bilaterally with no added sounds.  Poor bilateral air entry  Abdomen.  Soft lax.  No tenderness.  No organomegaly.  No guarding or rebound.  Extremities.  Addressed lower aspect of the left leg.  Left foot is cold and mildly cyanotic.  Dorsalis pedis and posterior tibial are not felt in the left lower extremity.  Delayed capillary refill.  CNS.  No acute focal  "neurological deficits    Results Review:      Results from last 7 days   Lab Units 06/12/24  0528 06/11/24  1354   SODIUM mmol/L 134* 136   POTASSIUM mmol/L 4.0 3.6   CHLORIDE mmol/L 103 103   CO2 mmol/L 21.0* 21.5*   BUN mg/dL 12 13   CREATININE mg/dL 1.06* 1.28*   GLUCOSE mg/dL 143* 119*   CALCIUM mg/dL 8.5* 9.5   AST (SGOT) U/L 16 25   ALT (SGPT) U/L 12 14     Estimated Creatinine Clearance: 56.3 mL/min (A) (by C-G formula based on SCr of 1.06 mg/dL (H)).  Results from last 7 days   Lab Units 06/12/24  0528   HEMOGLOBIN A1C % 5.40                               Invalid input(s): \"LDLCALC\"  Results from last 7 days   Lab Units 06/12/24  1102 06/12/24  0528 06/11/24  1354   WBC 10*3/mm3 11.75* 12.89* 8.13   HEMOGLOBIN g/dL 14.0 12.9 14.7   HEMATOCRIT % 40.4 37.1 42.4   PLATELETS 10*3/mm3 202 249 246   MCV fL 88.8 87.3 87.8   MCH pg 30.8 30.4 30.4   MCHC g/dL 34.7 34.8 34.7   RDW % 13.2 13.0 13.1   RDW-SD fl 42.6 40.8 41.5   MPV fL 10.1 10.2 10.2   NEUTROPHIL % % 75.0 81.5* 68.6   LYMPHOCYTE % % 14.4* 10.6* 19.2*   MONOCYTES % % 8.2 6.2 7.0   EOSINOPHIL % % 0.7 0.1* 3.3   BASOPHIL % % 0.6 0.5 0.9   IMM GRAN % % 1.1* 1.1* 1.0*   NEUTROS ABS 10*3/mm3 8.82* 10.52* 5.58   LYMPHS ABS 10*3/mm3 1.69 1.36 1.56   MONOS ABS 10*3/mm3 0.96* 0.80 0.57   EOS ABS 10*3/mm3 0.08 0.01 0.27   BASOS ABS 10*3/mm3 0.07 0.06 0.07   IMMATURE GRANS (ABS) 10*3/mm3 0.13* 0.14* 0.08*   NRBC /100 WBC 0.0 0.0 0.0     Results from last 7 days   Lab Units 06/12/24  1102 06/12/24  0321 06/11/24  1354   INR   --   --  1.02   APTT seconds 48.6* 49.5* 26.5                                       Imaging:  Imaging Results (Last 24 Hours)       Procedure Component Value Units Date/Time    Arteriogram (Autofinalize) [[id number]] Resulted: 06/11/24 1932     Updated: 06/11/24 1948    Narrative:      This procedure was auto-finalized with no dictation required.               I reviewed the patient's new clinical results / labs / tests / " procedures      Assessment/Plan     Active Hospital Problems    Diagnosis  POA   • **Arterial occlusion, lower extremity [I70.209]  Yes   • Asthma [J45.909]  Yes   • History of pulmonary embolism [Z86.711]  Yes   • History of anxiety state [Z91.89]  Yes   • Memory loss [R41.3]  Yes   • Mixed anxiety and depressive disorder [F41.8]  Yes   • Hypertension [I10]  Yes   • Stage 3 chronic kidney disease [N18.30]  Yes      Resolved Hospital Problems   No resolved problems to display.           Acute on chronic left lower extremity ischemia.  Status post embolectomy of multiple arteries in the left lower extremity on 6/11/2024.  Currently on IV heparin/aspirin/statin.  It does not appear that the patient has good results from the embolectomy.  Vascular surgery is following and we will await their feedback.  Check lipids.  LDL needs to be below 70.  History of DVT and pulmonary embolism.  No clinical evidence of DVT or PE.  Currently on IV heparin and eventually can be switched back to Xarelto.  History of CVA/dyslipidemia.  Continue aspirin and statin.  Check lipids.  LDL needs to be below 70.  History of hypertension.  Good blood pressure control with no evidence of angina or congestive heart failure.  On no blood pressure medication.  Stage IIIa chronic renal failure.  Patient appears to be euvolemic.  Baseline creatinine between 1 and 1.4.  Renal function about the baseline.  Avoid nephrotoxic agents.  Continue to monitor.  History of bronchial asthma.  Stable respiratory status on Symbicort.  Dementia.  Negative CNS examination.  Mentation about the baseline.  Continue Aricept and Namenda.  VTE prophylaxis.  Patient anticoagulated.      Discussed my findings and plan of treatment with the patient/family.  Disposition.  To be determined based on clinical course        Jacklyn Sibley MD  [facility]  06/12/24  15:41 EDT

## 2024-06-13 ENCOUNTER — HOME HEALTH ADMISSION (OUTPATIENT)
Dept: HOME HEALTH SERVICES | Facility: HOME HEALTHCARE | Age: 69
End: 2024-06-13
Payer: MEDICARE

## 2024-06-13 LAB
ANION GAP SERPL CALCULATED.3IONS-SCNC: 9.4 MMOL/L (ref 5–15)
BUN SERPL-MCNC: 10 MG/DL (ref 8–23)
BUN/CREAT SERPL: 8.2 (ref 7–25)
CALCIUM SPEC-SCNC: 8.5 MG/DL (ref 8.6–10.5)
CHLORIDE SERPL-SCNC: 106 MMOL/L (ref 98–107)
CHOLEST SERPL-MCNC: 106 MG/DL (ref 0–200)
CO2 SERPL-SCNC: 21.6 MMOL/L (ref 22–29)
CREAT SERPL-MCNC: 1.22 MG/DL (ref 0.57–1)
EGFRCR SERPLBLD CKD-EPI 2021: 48.4 ML/MIN/1.73
GLUCOSE SERPL-MCNC: 129 MG/DL (ref 65–99)
HDLC SERPL-MCNC: 45 MG/DL (ref 40–60)
LAB AP CASE REPORT: NORMAL
LDLC SERPL CALC-MCNC: 42 MG/DL (ref 0–100)
LDLC/HDLC SERPL: 0.91 {RATIO}
PATH REPORT.FINAL DX SPEC: NORMAL
PATH REPORT.GROSS SPEC: NORMAL
POTASSIUM SERPL-SCNC: 3.5 MMOL/L (ref 3.5–5.2)
SODIUM SERPL-SCNC: 137 MMOL/L (ref 136–145)
TRIGL SERPL-MCNC: 100 MG/DL (ref 0–150)
VLDLC SERPL-MCNC: 19 MG/DL (ref 5–40)

## 2024-06-13 PROCEDURE — 97162 PT EVAL MOD COMPLEX 30 MIN: CPT

## 2024-06-13 PROCEDURE — 94760 N-INVAS EAR/PLS OXIMETRY 1: CPT

## 2024-06-13 PROCEDURE — 99024 POSTOP FOLLOW-UP VISIT: CPT | Performed by: SURGERY

## 2024-06-13 PROCEDURE — 80061 LIPID PANEL: CPT | Performed by: INTERNAL MEDICINE

## 2024-06-13 PROCEDURE — 80048 BASIC METABOLIC PNL TOTAL CA: CPT | Performed by: INTERNAL MEDICINE

## 2024-06-13 PROCEDURE — 94799 UNLISTED PULMONARY SVC/PX: CPT

## 2024-06-13 PROCEDURE — 25010000002 MORPHINE PER 10 MG: Performed by: SURGERY

## 2024-06-13 PROCEDURE — 94664 DEMO&/EVAL PT USE INHALER: CPT

## 2024-06-13 PROCEDURE — 97530 THERAPEUTIC ACTIVITIES: CPT

## 2024-06-13 PROCEDURE — 94761 N-INVAS EAR/PLS OXIMETRY MLT: CPT

## 2024-06-13 RX ORDER — POTASSIUM CHLORIDE 750 MG/1
40 TABLET, FILM COATED, EXTENDED RELEASE ORAL EVERY 4 HOURS
Status: COMPLETED | OUTPATIENT
Start: 2024-06-13 | End: 2024-06-13

## 2024-06-13 RX ADMIN — FOLIC ACID 1000 MCG: 1 TABLET ORAL at 08:56

## 2024-06-13 RX ADMIN — DONEPEZIL HYDROCHLORIDE 10 MG: 10 TABLET, FILM COATED ORAL at 21:14

## 2024-06-13 RX ADMIN — ATORVASTATIN CALCIUM 10 MG: 10 TABLET, FILM COATED ORAL at 08:56

## 2024-06-13 RX ADMIN — POTASSIUM CHLORIDE 40 MEQ: 750 TABLET, EXTENDED RELEASE ORAL at 18:29

## 2024-06-13 RX ADMIN — HYDROCODONE BITARTRATE AND ACETAMINOPHEN 1 TABLET: 5; 325 TABLET ORAL at 06:46

## 2024-06-13 RX ADMIN — BUDESONIDE AND FORMOTEROL FUMARATE DIHYDRATE 2 PUFF: 160; 4.5 AEROSOL RESPIRATORY (INHALATION) at 07:46

## 2024-06-13 RX ADMIN — CETIRIZINE HYDROCHLORIDE 10 MG: 10 TABLET ORAL at 08:56

## 2024-06-13 RX ADMIN — HYDROCODONE BITARTRATE AND ACETAMINOPHEN 1 TABLET: 5; 325 TABLET ORAL at 14:44

## 2024-06-13 RX ADMIN — MORPHINE SULFATE 4 MG: 2 INJECTION, SOLUTION INTRAMUSCULAR; INTRAVENOUS at 01:32

## 2024-06-13 RX ADMIN — MORPHINE SULFATE 4 MG: 2 INJECTION, SOLUTION INTRAMUSCULAR; INTRAVENOUS at 15:59

## 2024-06-13 RX ADMIN — MORPHINE SULFATE 4 MG: 2 INJECTION, SOLUTION INTRAMUSCULAR; INTRAVENOUS at 09:28

## 2024-06-13 RX ADMIN — BUDESONIDE AND FORMOTEROL FUMARATE DIHYDRATE 2 PUFF: 160; 4.5 AEROSOL RESPIRATORY (INHALATION) at 19:08

## 2024-06-13 RX ADMIN — Medication 10 ML: at 21:17

## 2024-06-13 RX ADMIN — POTASSIUM CHLORIDE 40 MEQ: 750 TABLET, EXTENDED RELEASE ORAL at 14:45

## 2024-06-13 RX ADMIN — FENOFIBRATE 145 MG: 145 TABLET, FILM COATED ORAL at 08:56

## 2024-06-13 RX ADMIN — ASPIRIN 81 MG: 81 TABLET, COATED ORAL at 08:54

## 2024-06-13 RX ADMIN — MEMANTINE HYDROCHLORIDE 10 MG: 10 TABLET, FILM COATED ORAL at 21:14

## 2024-06-13 RX ADMIN — APIXABAN 5 MG: 5 TABLET, FILM COATED ORAL at 09:28

## 2024-06-13 RX ADMIN — MEMANTINE HYDROCHLORIDE 10 MG: 10 TABLET, FILM COATED ORAL at 08:55

## 2024-06-13 RX ADMIN — Medication 10 ML: at 08:56

## 2024-06-13 RX ADMIN — APIXABAN 5 MG: 5 TABLET, FILM COATED ORAL at 21:14

## 2024-06-13 RX ADMIN — Medication 100 MG: at 08:56

## 2024-06-13 NOTE — PLAN OF CARE
Goal Outcome Evaluation:   VSS. Alert to self only. C/o pain to left foot. Remains cool and dusky, no pulses to left foot. PRN pain medication given. Incisions CDI. TERRANCE dsg to right groin. Pt agitated and anxious at start of shift, pulled out IV. [facility] notified, order for x1 dose of PO zyprexa, effective. Pt remains confused but is calm and cooperative now. Call light in reach. Family at bedside.

## 2024-06-13 NOTE — PLAN OF CARE
Goal Outcome Evaluation:  Plan of Care Reviewed With: patient           Outcome Evaluation: Pt. is a 68 year old Female who is s/p Left Femoral Embolectomy and Left Anterior/Posterior Tibial Artery Embolectomy.  Pt. reports that prior to admission she was independent with functional mobility and used no A.D. during ambulation.  Pt. currently presents with decreased strength, decreased balance, and decreased tolerance to functional activity.  This AM, pt. able to take 5 shuffled sidesteps, Min. assist x 2, with use of Rwx.  Pt. requires Min. assist x 1 for bed mobility and Min. assist x 2 for sit <-> stand transfers. Pt. limited in upright mobility due to Left ankle/foot pain.  Pt. had to maintain TTWB LLE after attempting weight bearing with an increase in pain.  Pt. will benefit from skilled inpt. P.T. to address her functional deficits and to assist pt. in regaining her maximum level of independence with functional mobility.      Anticipated Discharge Disposition (PT): home with assist, home with home health, skilled nursing facility (All pending pt. progress)

## 2024-06-13 NOTE — THERAPY EVALUATION
Patient Name: Erin Jones  : 1955    MRN: [id number]                              Today's Date: 2024       Admit Date: 2024    Visit Dx:     ICD-10-CM ICD-9-CM   1. Ischemic leg  I99.8 459.9   2. DVT (deep venous thrombosis)  I82.409 453.40     Patient Active Problem List   Diagnosis    Moderate late onset Alzheimer's dementia with anxiety    Asthma    Chest pain    History of anxiety state    Hyperlipidemia    Hypertension    Memory loss    Mixed anxiety and depressive disorder    Stage 3 chronic kidney disease    Overweight with body mass index (BMI) 25.0-29.9    Hypokalemia    Hypocalcemia    Obesity (BMI 30-39.9)    DNR (do not resuscitate)    History of pulmonary embolism    Cerebrovascular disease    Elevated serum homocysteine level    Arterial occlusion, lower extremity     Past Medical History:   Diagnosis Date    Asthma 2022    Dementia 2020    Hyperlipidemia 2015    Hypertension 2015    Memory loss 2019    Mixed anxiety and depressive disorder 2018    Obesity (BMI 30-39.9) 2022    Stage 3 chronic kidney disease 2015     Past Surgical History:   Procedure Laterality Date    FEMORAL THROMBECTOMY/EMBOLECTOMY Left 2024    Procedure: FEMORAL THROMBECTOMY/EMBOLECTOMY;  Surgeon: Clifton Guy MD;  Location: [facility];  Service: Vascular;  Laterality: Left;      General Information       Row Name 24 1103          Physical Therapy Time and Intention    Document Type evaluation  Pt. is s/p Left Femoral Embolectomy; Left Anterior/Posterior Tibial Artery Embolectomy  -MS     Mode of Treatment physical therapy;individual therapy  -MS       Row Name 24 1103          General Information    Patient Profile Reviewed yes  -MS     Prior Level of Function independent:  -MS     Existing Precautions/Restrictions fall   Exit alarm  -MS     Barriers to Rehab none identified  -MS       Row Name 24 1103          Cognition     Orientation Status (Cognition) oriented x 3  -MS       Row Name 06/13/24 1103          Safety Issues, Functional Mobility    Comment, Safety Issues/Impairments (Mobility) Gait belt used for safety.  -MS               User Key  (r) = Recorded By, (t) = Taken By, (c) = Cosigned By      Initials Name Provider Type    MS Del Toro, Gilbert LLOYD, PT Physical Therapist                   Mobility       Row Name 06/13/24 1104          Bed Mobility    Bed Mobility supine-sit;sit-supine  -MS     Supine-Sit [place] (Bed Mobility) minimum assist (75% patient effort)  -MS     Sit-Supine [place] (Bed Mobility) minimum assist (75% patient effort)  -MS       Row Name 06/13/24 1104          Sit-Stand Transfer    Sit-Stand [place] (Transfers) minimum assist (75% patient effort);2 person assist  -MS     Assistive Device (Sit-Stand Transfers) walker, front-wheeled  -MS       Row Name 06/13/24 1104          Gait/Stairs (Locomotion)    [place] Level (Gait) minimum assist (75% patient effort);2 person assist  -MS     Assistive Device (Gait) walker, front-wheeled  -MS     Distance in Feet (Gait) --  Pt. able to take 5 shuffled sidesteps at bedside  -MS     Comment, (Gait/Stairs) Pt. has to maintain TTWB LLE due to increased pain in her Left ankle/foot.  Pt. attempted weight bearing but could not, limiting her upright mobility at this time.  -MS               User Key  (r) = Recorded By, (t) = Taken By, (c) = Cosigned By      Initials Name Provider Type    MS Del Toro Gilbert LLOYD, PT Physical Therapist                   Obj/Interventions       Row Name 06/13/24 1105          Range of Motion Comprehensive    Comment, General Range of Motion BUE/LE (WFL's) with exception to left ankle/foot (Imp. 75%) due to pain  -MS       Row Name 06/13/24 1105          Strength Comprehensive (MMT)    Comment, General Manual Muscle Testing (MMT) Assessment BUE/LE (3+/5) with exception to Left Ankle/foot (3-/5) due to pain  -MS               User  Key  (r) = Recorded By, (t) = Taken By, (c) = Cosigned By      Initials Name Provider Type    MS Del Toro Gilbert LLOYD, PT Physical Therapist                   Goals/Plan       Row Name 06/13/24 1107          Bed Mobility Goal 1 (PT)    Activity/Assistive Device (Bed Mobility Goal 1, PT) bed mobility activities, all  -MS     [place] Level/Cues Needed (Bed Mobility Goal 1, PT) standby assist  -MS     Time Frame (Bed Mobility Goal 1, PT) long term goal (LTG);1 week  -MS       Row Name 06/13/24 1107          Transfer Goal 1 (PT)    Activity/Assistive Device (Transfer Goal 1, PT) transfers, all  With or without AAD  -MS     [place] Level/Cues Needed (Transfer Goal 1, PT) standby assist  -MS     Time Frame (Transfer Goal 1, PT) long term goal (LTG);1 week  -MS       Row Name 06/13/24 1107          Gait Training Goal 1 (PT)    Activity/Assistive Device (Gait Training Goal 1, PT) gait (walking locomotion)  With or without AAD  -MS     [place] Level (Gait Training Goal 1, PT) standby assist  -MS     Distance (Gait Training Goal 1, PT) 100 feet  -MS     Time Frame (Gait Training Goal 1, PT) long term goal (LTG);1 week  -MS       Row Name 06/13/24 1107          Therapy Assessment/Plan (PT)    Planned Therapy Interventions (PT) balance training;bed mobility training;gait training;home exercise program;patient/family education;postural re-education;transfer training;strengthening;ROM (range of motion)  -MS               User Key  (r) = Recorded By, (t) = Taken By, (c) = Cosigned By      Initials Name Provider Type    MS Del Toro Gilbert LLOYD, PT Physical Therapist                   Clinical Impression       Row Name 06/13/24 1106          Pain    Pretreatment Pain Rating 7/10  -MS     Posttreatment Pain Rating 7/10  -MS     Pain Location - Side/Orientation Left  -MS     Pain Location - ankle;foot  -MS     Pain Intervention(s) Medication (See MAR);Elevated;Nursing Notified;Repositioned  -MS       Row Name 06/13/24 1106           Plan of Care Review    Plan of Care Reviewed With patient  -MS       Row Name 06/13/24 1106          Therapy Assessment/Plan (PT)    Rehab Potential (PT) good, to achieve stated therapy goals  -MS     Criteria for Skilled Interventions Met (PT) skilled treatment is necessary  -MS     Therapy Frequency (PT) 6 times/wk  -MS       Row Name 06/13/24 1106          Positioning and Restraints    Pre-Treatment Position in bed  -MS     Post Treatment Position bed  -MS     In Bed notified nsg;supine;call light within reach;encouraged to call for assist;exit alarm on;with family/caregiver;L heel elevated;R heel elevated  All lines intact.  -MS               User Key  (r) = Recorded By, (t) = Taken By, (c) = Cosigned By      Initials Name Provider Type    Gilbert Gaytan, PT Physical Therapist                   Outcome Measures       Row Name 06/13/24 1108          How much help from another person do you currently need...    Turning from your back to your side while in flat bed without using bedrails? 3  -MS     Moving from lying on back to sitting on the side of a flat bed without bedrails? 3  -MS     Moving to and from a bed to a chair (including a wheelchair)? 2  -MS     Standing up from a chair using your arms (e.g., wheelchair, bedside chair)? 2  -MS     Climbing 3-5 steps with a railing? 2  -MS     To walk in hospital room? 2  -MS     AM-PAC 6 Clicks Score (PT) 14  -MS     Highest Level of Mobility Goal 4 --> Transfer to chair/commode  -MS       Row Name 06/13/24 1108          Functional Assessment    Outcome Measure Options AM-PAC 6 Clicks Basic Mobility (PT)  -MS               User Key  (r) = Recorded By, (t) = Taken By, (c) = Cosigned By      Initials Name Provider Type    Gilbert Gaytan, PT Physical Therapist                                 Physical Therapy Education       Title: PT OT SLP Therapies (In Progress)       Topic: Physical Therapy (In Progress)       Point: Mobility training (Done)        Learning Progress Summary             Patient Acceptance, E,D, VU,NR by MS at 6/13/2024 1109                         Point: Home exercise program (Not Started)       Learner Progress:  Not documented in this visit.              Point: Body mechanics (Done)       Learning Progress Summary             Patient Acceptance, E,D, VU,NR by MS at 6/13/2024 1109                         Point: Precautions (Done)       Learning Progress Summary             Patient Acceptance, E,D, VU,NR by MS at 6/13/2024 1109                                         User Key       Initials Effective Dates Name Provider Type Discipline    MS 06/16/21 -  Gilbert Del Toro, PT Physical Therapist PT                  PT Recommendation and Plan  Planned Therapy Interventions (PT): balance training, bed mobility training, gait training, home exercise program, patient/family education, postural re-education, transfer training, strengthening, ROM (range of motion)  Plan of Care Reviewed With: patient  Outcome Evaluation: Pt. is a 68 year old Female who is s/p Left Femoral Embolectomy and Left Anterior/Posterior Tibial Artery Embolectomy.  Pt. reports that prior to admission she was independent with functional mobility and used no A.D. during ambulation.  Pt. currently presents with decreased strength, decreased balance, and decreased tolerance to functional activity.  This AM, pt. able to take 5 shuffled sidesteps, Min. assist x 2, with use of Rwx.  Pt. requires Min. assist x 1 for bed mobility and Min. assist x 2 for sit <-> stand transfers. Pt. limited in upright mobility due to Left ankle/foot pain.  Pt. had to maintain TTWB LLE after attempting weight bearing with an increase in pain.  Pt. will benefit from skilled inpt. P.T. to address her functional deficits and to assist pt. in regaining her maximum level of independence with functional mobility.     Time Calculation:         PT Charges       Row Name 06/13/24 3594             Time Calculation     Start Time 0815  -MS      Stop Time 0835  -MS      Time Calculation (min) 20 min  -MS      PT Received On 06/13/24  -MS      PT - Next Appointment 06/14/24  -MS      PT Goal Re-Cert Due Date 06/20/24  -MS         Time Calculation- PT    Total Timed Code Minutes- PT 19 minute(s)  -MS                User Key  (r) = Recorded By, (t) = Taken By, (c) = Cosigned By      Initials Name Provider Type    Gilbert Gaytan, PT Physical Therapist                  Therapy Charges for Today       Code Description Service Date Service Provider Modifiers Qty    25990991670 HC PT EVAL MOD COMPLEXITY 2 6/13/2024 Gilbert Del Toro, PT GP 1    87191483600 HC PT THERAPEUTIC ACT EA 15 MIN 6/13/2024 Gilbert Del Toro, PT GP 1    05830736737 HC PT THER SUPP EA 15 MIN 6/13/2024 Gilbert Del Toro, PT GP 1            PT G-Codes  Outcome Measure Options: AM-PAC 6 Clicks Basic Mobility (PT)  AM-PAC 6 Clicks Score (PT): 14  PT Discharge Summary  Anticipated Discharge Disposition (PT): home with assist, home with home health, skilled nursing facility (Pending pt. progress)    Gilbert Del Toro, PT  6/13/2024

## 2024-06-13 NOTE — NURSING NOTE
Pt noted to have minimal UOP in pinon. At times <30cc/hr. NS @50ml/hr infusing. Pt does not appear to be eating/drinking much. Page placed to [facility]. Monitor for now.

## 2024-06-13 NOTE — CASE MANAGEMENT/SOCIAL WORK
Discharge Planning Assessment  [facility]     Patient Name: Erin Jnoes  MRN: [id number]  Today's Date: 6/13/2024    Admit Date: 6/11/2024    Plan: Home with HH vs SNF   Discharge Needs Assessment       Row Name 06/13/24 1252       Living Environment    People in Home spouse    Current Living Arrangements home    Potentially Unsafe Housing Conditions none    In the past 12 months has the electric, gas, oil, or water company threatened to shut off services in your home? No    Primary Care Provided by self    Provides Primary Care For no one    Family Caregiver if Needed spouse;child(ita), adult    Family Caregiver Names  Clay [phone] Daughter Candace [phone]    Quality of Family Relationships supportive    Able to Return to Prior Arrangements yes       Resource/Environmental Concerns    Resource/Environmental Concerns none    Transportation Concerns none       Transportation Needs    In the past 12 months, has lack of transportation kept you from medical appointments or from getting medications? no    In the past 12 months, has lack of transportation kept you from meetings, work, or from getting things needed for daily living? No       Food Insecurity    Within the past 12 months, you worried that your food would run out before you got the money to buy more. Never true    Within the past 12 months, the food you bought just didn't last and you didn't have money to get more. Never true       Transition Planning    Patient/Family Anticipates Transition to home with family;inpatient rehabilitation facility;home with help/services    Patient/Family Anticipated Services at Transition home health care;rehabilitation services    Transportation Anticipated family or friend will provide       Discharge Needs Assessment    Equipment Currently Used at Home none    Concerns to be Addressed discharge planning    Anticipated Changes Related to Illness none    Equipment Needed After Discharge none                    Discharge Plan       Row Name 06/13/24 1250       Plan    Plan Home with HH vs SNF    Patient/Family in Agreement with Plan yes    Plan Comments Met with pt and daughter in room. Introduced self, explained  role, verified face sheet. Discussed plan, pt may need rehab and is agreeable to go. She is also agreeable to HH PT. Referrals entered for [facility] for SNF and H. Secondary option for HH is [facility]. CCP provided list of SNFs near pt's home and they will pick out one or two more options. Pt does not use medical equipment at home. She lives at home with her . Daughter will transport home. She denies any needs at this time. CCP to follow. THIERRY Meza RN                  Continued Care and Services - Admitted Since 6/11/2024    No active coordination exists for this encounter.          Demographic Summary       Row Name 06/13/24 1251       General Information    Admission Type inpatient    Arrived From emergency department    Referral Source admission list    Reason for Consult discharge planning    Preferred Language English                   Functional Status    No documentation.                  Psychosocial    No documentation.                  Abuse/Neglect    No documentation.                  Legal    No documentation.                  Substance Abuse    No documentation.                  Patient Forms    No documentation.                     Sravan Westfall, CAITIE

## 2024-06-13 NOTE — DISCHARGE PLACEMENT REQUEST
"Erin Jones (68 y.o. Female)       Date of Birth   1955    Social Security Number       Address   [address] [address]    Home Phone       MRN   [id number]       [facility]    Marital Status                               Admission Date   6/11/24    Admission Type   Emergency    Admitting Provider   Tana Reyes MD    Attending Provider   Jacklyn Sibley MD    Department, Room/Bed   [address], [address]       Discharge Date       Discharge Disposition       Discharge Destination                                 Attending Provider: Jacklyn Sibley MD    Allergies: No Known Allergies    Isolation: None   Infection: None   Code Status: CPR    Ht: 165.1 cm (65\")   Wt: 90.1 kg (198 lb 10.2 oz)    Admission Cmt: None   Principal Problem: Arterial occlusion, lower extremity [I70.209]                   Active Insurance as of 6/11/2024       Primary Coverage       Payor Plan Insurance Group Employer/Plan Group    HUMANA MEDICARE REPLACEMENT HUMANA MEDICARE REPLACEMENT [id number]       Payor Plan Address Payor Plan Phone Number Payor Plan Fax Number Effective Dates    [address] [address] [phone]  1/1/2023 - None Entered    [facility] [address]         Subscriber Name Subscriber Birth Date Member ID       ERIN JONES 1955 [id number]                     Emergency Contacts        (Rel.) Home Phone Work Phone Mobile Phone    MIRELLA REYES (Daughter) -- -- [phone]    RobertClay (Spouse) -- -- [phone]              Emergency Contact Information       Name Relation Home Work Mobile    MIRELLA REYES Daughter   [phone]    SamuelClay fisher Spouse   [phone]          "

## 2024-06-13 NOTE — PROGRESS NOTES
Name: Erin Jones ADMIT: 2024   : 1955  PCP: Laurie De La Rosa APRN    MRN: [id number] LOS: 2 days   AGE/SEX: 68 y.o. female  ROOM: [facility]     Subjective   Subjective   Still complaining of pain in the left leg.  Cannot really tell me much about the temperature and the color of her left lower extremity.  No fever or chills.  No left lower extremity ulceration.  Reports vomiting did not nurses about decreased urine output.    Review of Systems  Cardiovascular/respiratory.  No chest pain/no palpitations/no shortness of breath  GI.  No abdominal pain or nausea or vomiting  .  Clear urine in the Henry catheter.       Objective   Objective   Vital Signs  Temp:  [97.3 °F (36.3 °C)-99.4 °F (37.4 °C)] 98.4 °F (36.9 °C)  Heart Rate:  [76-90] 90  Resp:  [16] 16  BP: (115-143)/(62-86) 115/62  SpO2:  [95 %-100 %] 100 %  on   ;   Device (Oxygen Therapy): room air    Intake/Output Summary (Last 24 hours) at 2024 1158  Last data filed at 2024 0700  Gross per 24 hour   Intake 240 ml   Output 625 ml   Net -385 ml     Body mass index is 33.05 kg/m².      24  1325 24  2200   Weight: 90.7 kg (200 lb) 90.1 kg (198 lb 10.2 oz)     Physical Exam  General.  Middle-aged female who appears older than stated age.  She is alert and oriented times 2 out of 4 (baseline according to her family).  In no apparent pain/distress/diaphoresis.  Eyes.  Pupils equal round and reactive.  Intact extraocular musculature.  No pallor or jaundice  Oral cavity.  Moist mucous membrane.  Neck.  Supple.  No JVD.  No lymphadenopathy or thyromegaly.  Cardiovascular regular rate and rhythm with no gallops or murmurs  Chest.  Clear to auscultation bilaterally with no added sounds.  Poor bilateral air entry  Abdomen.  Soft lax.  No tenderness.  No organomegaly.  No guarding or rebound.  Extremities.  dressed lower aspect of the left leg.  Left foot is less cold and less cyanotic than yesterday.  Dorsalis pedis and  posterior tibial are not felt in the left lower extremity.  Delayed capillary refill.  CNS.  No acute focal neurological deficits      Results Review:      Results from last 7 days   Lab Units 06/13/24  1058 06/12/24  0528 06/11/24  1354   SODIUM mmol/L 137 134* 136   POTASSIUM mmol/L 3.5 4.0 3.6   CHLORIDE mmol/L 106 103 103   CO2 mmol/L 21.6* 21.0* 21.5*   BUN mg/dL 10 12 13   CREATININE mg/dL 1.22* 1.06* 1.28*   GLUCOSE mg/dL 129* 143* 119*   CALCIUM mg/dL 8.5* 8.5* 9.5   AST (SGOT) U/L  --  16 25   ALT (SGPT) U/L  --  12 14     Estimated Creatinine Clearance: 48.9 mL/min (A) (by C-G formula based on SCr of 1.22 mg/dL (H)).  Results from last 7 days   Lab Units 06/12/24  0528   HEMOGLOBIN A1C % 5.40                         Results from last 7 days   Lab Units 06/13/24  1058   CHOLESTEROL mg/dL 106   TRIGLYCERIDES mg/dL 100   HDL CHOL mg/dL 45     Results from last 7 days   Lab Units 06/12/24  1102 06/12/24  0528 06/11/24  1354   WBC 10*3/mm3 11.75* 12.89* 8.13   HEMOGLOBIN g/dL 14.0 12.9 14.7   HEMATOCRIT % 40.4 37.1 42.4   PLATELETS 10*3/mm3 202 249 246   MCV fL 88.8 87.3 87.8   MCH pg 30.8 30.4 30.4   MCHC g/dL 34.7 34.8 34.7   RDW % 13.2 13.0 13.1   RDW-SD fl 42.6 40.8 41.5   MPV fL 10.1 10.2 10.2   NEUTROPHIL % % 75.0 81.5* 68.6   LYMPHOCYTE % % 14.4* 10.6* 19.2*   MONOCYTES % % 8.2 6.2 7.0   EOSINOPHIL % % 0.7 0.1* 3.3   BASOPHIL % % 0.6 0.5 0.9   IMM GRAN % % 1.1* 1.1* 1.0*   NEUTROS ABS 10*3/mm3 8.82* 10.52* 5.58   LYMPHS ABS 10*3/mm3 1.69 1.36 1.56   MONOS ABS 10*3/mm3 0.96* 0.80 0.57   EOS ABS 10*3/mm3 0.08 0.01 0.27   BASOS ABS 10*3/mm3 0.07 0.06 0.07   IMMATURE GRANS (ABS) 10*3/mm3 0.13* 0.14* 0.08*   NRBC /100 WBC 0.0 0.0 0.0     Results from last 7 days   Lab Units 06/12/24  1832 06/12/24  1102 06/12/24  0321 06/11/24  1354   INR   --   --   --  1.02   APTT seconds 73.8* 48.6* 49.5* 26.5                                       Imaging:  Imaging Results (Last 24 Hours)       ** No results found for the  last 24 hours. **               I reviewed the patient's new clinical results / labs / tests / procedures      Assessment/Plan     Active Hospital Problems    Diagnosis  POA   • **Arterial occlusion, lower extremity [I70.209]  Yes   • Asthma [J45.909]  Yes   • History of pulmonary embolism [Z86.711]  Yes   • History of anxiety state [Z91.89]  Yes   • Memory loss [R41.3]  Yes   • Mixed anxiety and depressive disorder [F41.8]  Yes   • Hypertension [I10]  Yes   • Stage 3 chronic kidney disease [N18.30]  Yes      Resolved Hospital Problems   No resolved problems to display.       Acute on chronic left lower extremity ischemia.  Status post embolectomy of multiple arteries in the left lower extremity on 6/11/2024.  Switch from IV heparin added to Eliquis today.  Also on aspirin/statin.  Clinically with better perfusion of the left lower extremity vascular surgery is following and we will await their feedback.  LDL is 42.    History of DVT and pulmonary embolism.  No clinical evidence of DVT or PE.  Status post IV heparin.  Currently on Eliquis.    History of CVA/dyslipidemia.  Continue aspirin and statin.  LDL is 42.  History of hypertension.  Good blood pressure control with no evidence of angina or congestive heart failure.  On no blood pressure medication.  Stage IIIa chronic renal failure/decreased urine output..  Patient appears to be euvolemic.  Baseline creatinine between 1 and 1.4.  Renal function about the baseline.  Avoid nephrotoxic agents.  Will place on slow IV fluid and monitor renal function and input and output.  Will DC Henry catheter.  History of bronchial asthma.  Stable respiratory status on Symbicort.  Dementia.  Negative CNS examination.  Mentation about the baseline.  Continue Aricept and Namenda.  VTE prophylaxis.  Patient anticoagulated.        Discussed my findings and plan of treatment with the patient/family.  Disposition.  To home with home health and family care versus SNF.  Physical therapy  evaluation.  Anticipate discharge in 1 to 2 days based on vascular surgery feedback.             Jacklyn Sibley MD  [facility]  06/13/24  11:58 EDT

## 2024-06-13 NOTE — PROGRESS NOTES
[facility]   Surgical Progress Note    Patient Name: Erin Jones  : 1955  MRN: [id number]  Date of admission: 2024  Surgical Procedures Since Admission:  Procedure(s):  FEMORAL THROMBECTOMY/EMBOLECTOMY  Surgeon:  Clifton Guy MD  Status:  2 Days Post-Op  -------------------    Subjective   Subjective     Chief Complaint: Peripheral arterial disease with acute cold lower extremity.    History of Present Illness   Patient resting comfortably.  Denies chest pain or shortness of breath.  Decreased motor and sensation to the left foot persist.    Review of Systems    Objective   Objective     Vitals:   Temp:  [97.3 °F (36.3 °C)-99.4 °F (37.4 °C)] 98.4 °F (36.9 °C)  Heart Rate:  [76-90] 76  Resp:  [16] 16  BP: (125-143)/(75-86) 137/86  Output by Drain (mL) 24 0701 - 24 1900 24 1901 - 24 0700 24 0701 - 24 0846 Range Total   Urethral Catheter Silicone 16 Fr.  625  625       Physical Exam  Constitutional:       Appearance: She is well-developed.   Pulmonary:      Effort: Pulmonary effort is normal. No respiratory distress.   Abdominal:      General: There is no distension.      Palpations: Abdomen is soft.   Neurological:      Mental Status: She is alert and oriented to person, place, and time.     Diminished motor sensation in the left foot with reasonable cap refill in the left great toe.  All compartments of the left calf are soft without tenderness.  Patient now with multiphasic mid to distal peroneal artery signal.     Result Review    Result Review:  I have personally reviewed the results from the time of this admission to 2024 08:46 EDT and agree with these findings:  [x]  Laboratory list / accordion  []  Microbiology  []  Radiology  []  EKG/Telemetry   []  Cardiology/Vascular   []  Pathology  []  Old records  []  Other:  Most notable findings include: White count 11.7 hemoglobin 14.      Assessment & Plan   Assessment / Plan     Brief Patient  Summary:  Erin Jones is a 68 y.o. female who acute on chronic left lower extremity ischemia related to likely a chronically embolizing left iliac thrombus.    Active Hospital Problems:   Active Hospital Problems    Diagnosis    • **Arterial occlusion, lower extremity    • Asthma    • History of pulmonary embolism    • History of anxiety state    • Memory loss    • Mixed anxiety and depressive disorder    • Hypertension    • Stage 3 chronic kidney disease      Plan:   6/11/2024: Open left femoral embolectomy with anterior posterior tibial artery embolectomies.    6/12/2024: Postop day 1.  Reasonable cap refill in the left foot but unable to Doppler signals.  Guarded prognosis about viability long-term of left foot.  All revascularization options have been performed.  Will continue heparin infusion for now and allow foot to declare itself.  Patient and family understand risk of limb loss.    6/13/2024: Postop day 2.  Remains with fairly good cap refill more on the medial foot than the lateral foot.  Still absent anterior and posterior tibial artery signals however the peroneal artery signal is found today and is multiphasic.  Long discussion with patient and family,  and daughter, about complexities of the case as well as plans.  Current plan is await to transition from IV heparin to EliquisThis will be in addition to aspirin 81 mg and atorvastatin.  Will allow left foot to recover maximally and demarcate.  Ultimately unclear of long-term prognosis of the left foot however more optimistic today than yesterday.  Okay to DC Henry catheter from my standpoint.  Will ask physical and Occupational Therapy to see the patient.  May benefit from inpatient rehabilitation/nursing home.    VTE Prophylaxis:  Pharmacologic VTE prophylaxis orders are present.        Clifton Guy MD

## 2024-06-14 VITALS
OXYGEN SATURATION: 96 % | SYSTOLIC BLOOD PRESSURE: 133 MMHG | RESPIRATION RATE: 18 BRPM | HEIGHT: 65 IN | TEMPERATURE: 99.9 F | DIASTOLIC BLOOD PRESSURE: 82 MMHG | HEART RATE: 87 BPM | BODY MASS INDEX: 33.09 KG/M2 | WEIGHT: 198.63 LBS

## 2024-06-14 PROBLEM — Z86.718 HISTORY OF DVT (DEEP VEIN THROMBOSIS): Status: ACTIVE | Noted: 2024-06-14

## 2024-06-14 LAB
ANION GAP SERPL CALCULATED.3IONS-SCNC: 10.3 MMOL/L (ref 5–15)
BUN SERPL-MCNC: 7 MG/DL (ref 8–23)
BUN/CREAT SERPL: 6.8 (ref 7–25)
CALCIUM SPEC-SCNC: 8.1 MG/DL (ref 8.6–10.5)
CHLORIDE SERPL-SCNC: 107 MMOL/L (ref 98–107)
CO2 SERPL-SCNC: 21.7 MMOL/L (ref 22–29)
CREAT SERPL-MCNC: 1.03 MG/DL (ref 0.57–1)
EGFRCR SERPLBLD CKD-EPI 2021: 59.3 ML/MIN/1.73
GLUCOSE SERPL-MCNC: 106 MG/DL (ref 65–99)
POTASSIUM SERPL-SCNC: 3.6 MMOL/L (ref 3.5–5.2)
POTASSIUM SERPL-SCNC: 4.5 MMOL/L (ref 3.5–5.2)
SODIUM SERPL-SCNC: 139 MMOL/L (ref 136–145)

## 2024-06-14 PROCEDURE — 25010000002 MORPHINE PER 10 MG: Performed by: SURGERY

## 2024-06-14 PROCEDURE — 84132 ASSAY OF SERUM POTASSIUM: CPT | Performed by: INTERNAL MEDICINE

## 2024-06-14 PROCEDURE — 97535 SELF CARE MNGMENT TRAINING: CPT

## 2024-06-14 PROCEDURE — 94760 N-INVAS EAR/PLS OXIMETRY 1: CPT

## 2024-06-14 PROCEDURE — 94799 UNLISTED PULMONARY SVC/PX: CPT

## 2024-06-14 PROCEDURE — 80048 BASIC METABOLIC PNL TOTAL CA: CPT | Performed by: INTERNAL MEDICINE

## 2024-06-14 PROCEDURE — 94664 DEMO&/EVAL PT USE INHALER: CPT

## 2024-06-14 PROCEDURE — 97530 THERAPEUTIC ACTIVITIES: CPT

## 2024-06-14 PROCEDURE — 94761 N-INVAS EAR/PLS OXIMETRY MLT: CPT

## 2024-06-14 PROCEDURE — 25010000002 POTASSIUM CHLORIDE 10 MEQ/100ML SOLUTION: Performed by: INTERNAL MEDICINE

## 2024-06-14 PROCEDURE — 99024 POSTOP FOLLOW-UP VISIT: CPT | Performed by: SURGERY

## 2024-06-14 PROCEDURE — 94762 N-INVAS EAR/PLS OXIMTRY CONT: CPT

## 2024-06-14 PROCEDURE — 97166 OT EVAL MOD COMPLEX 45 MIN: CPT

## 2024-06-14 RX ORDER — POTASSIUM CHLORIDE 750 MG/1
40 TABLET, FILM COATED, EXTENDED RELEASE ORAL EVERY 4 HOURS
Status: COMPLETED | OUTPATIENT
Start: 2024-06-14 | End: 2024-06-14

## 2024-06-14 RX ORDER — POTASSIUM CHLORIDE 7.45 MG/ML
10 INJECTION INTRAVENOUS
Status: DISCONTINUED | OUTPATIENT
Start: 2024-06-14 | End: 2024-06-14

## 2024-06-14 RX ORDER — HYDROCODONE BITARTRATE AND ACETAMINOPHEN 5; 325 MG/1; MG/1
1 TABLET ORAL EVERY 6 HOURS PRN
Qty: 8 TABLET | Refills: 0 | Status: SHIPPED | OUTPATIENT
Start: 2024-06-14 | End: 2024-06-17

## 2024-06-14 RX ORDER — ASPIRIN 81 MG/1
81 TABLET ORAL DAILY
Qty: 30 TABLET | Refills: 3 | Status: SHIPPED | OUTPATIENT
Start: 2024-06-15

## 2024-06-14 RX ORDER — FAMOTIDINE 40 MG/1
40 TABLET, FILM COATED ORAL DAILY
Qty: 30 TABLET | Refills: 3 | Status: SHIPPED | OUTPATIENT
Start: 2024-06-14

## 2024-06-14 RX ADMIN — POTASSIUM CHLORIDE 40 MEQ: 750 TABLET, EXTENDED RELEASE ORAL at 11:10

## 2024-06-14 RX ADMIN — POTASSIUM CHLORIDE 40 MEQ: 750 TABLET, EXTENDED RELEASE ORAL at 14:42

## 2024-06-14 RX ADMIN — BUDESONIDE AND FORMOTEROL FUMARATE DIHYDRATE 2 PUFF: 160; 4.5 AEROSOL RESPIRATORY (INHALATION) at 08:09

## 2024-06-14 RX ADMIN — ASPIRIN 81 MG: 81 TABLET, COATED ORAL at 09:35

## 2024-06-14 RX ADMIN — FENOFIBRATE 145 MG: 145 TABLET, FILM COATED ORAL at 09:35

## 2024-06-14 RX ADMIN — MORPHINE SULFATE 4 MG: 2 INJECTION, SOLUTION INTRAMUSCULAR; INTRAVENOUS at 13:31

## 2024-06-14 RX ADMIN — DONEPEZIL HYDROCHLORIDE 10 MG: 10 TABLET, FILM COATED ORAL at 20:10

## 2024-06-14 RX ADMIN — FOLIC ACID 1000 MCG: 1 TABLET ORAL at 09:36

## 2024-06-14 RX ADMIN — BUDESONIDE AND FORMOTEROL FUMARATE DIHYDRATE 2 PUFF: 160; 4.5 AEROSOL RESPIRATORY (INHALATION) at 20:43

## 2024-06-14 RX ADMIN — HYDROCODONE BITARTRATE AND ACETAMINOPHEN 1 TABLET: 5; 325 TABLET ORAL at 09:36

## 2024-06-14 RX ADMIN — ATORVASTATIN CALCIUM 10 MG: 10 TABLET, FILM COATED ORAL at 09:35

## 2024-06-14 RX ADMIN — MEMANTINE HYDROCHLORIDE 10 MG: 10 TABLET, FILM COATED ORAL at 20:10

## 2024-06-14 RX ADMIN — Medication 10 ML: at 20:11

## 2024-06-14 RX ADMIN — POTASSIUM CHLORIDE 10 MEQ: 7.46 INJECTION, SOLUTION INTRAVENOUS at 09:37

## 2024-06-14 RX ADMIN — Medication 100 MG: at 09:37

## 2024-06-14 RX ADMIN — Medication 10 ML: at 10:12

## 2024-06-14 RX ADMIN — CETIRIZINE HYDROCHLORIDE 10 MG: 10 TABLET ORAL at 12:12

## 2024-06-14 RX ADMIN — MORPHINE SULFATE 4 MG: 2 INJECTION, SOLUTION INTRAMUSCULAR; INTRAVENOUS at 09:37

## 2024-06-14 RX ADMIN — APIXABAN 5 MG: 5 TABLET, FILM COATED ORAL at 20:10

## 2024-06-14 RX ADMIN — MEMANTINE HYDROCHLORIDE 10 MG: 10 TABLET, FILM COATED ORAL at 09:36

## 2024-06-14 RX ADMIN — APIXABAN 5 MG: 5 TABLET, FILM COATED ORAL at 09:35

## 2024-06-14 RX ADMIN — MORPHINE SULFATE 4 MG: 2 INJECTION, SOLUTION INTRAMUSCULAR; INTRAVENOUS at 05:53

## 2024-06-14 NOTE — THERAPY TREATMENT NOTE
Patient Name: Erin Jones  : 1955    MRN: [id number]                              Today's Date: 2024       Admit Date: 2024    Visit Dx:     ICD-10-CM ICD-9-CM   1. Ischemic leg  I99.8 459.9   2. DVT (deep venous thrombosis)  I82.409 453.40     Patient Active Problem List   Diagnosis    Dementia    Asthma    Chest pain    History of anxiety state    Hyperlipidemia    Hypertension    Memory loss    Mixed anxiety and depressive disorder    Stage 3 chronic kidney disease    Overweight with body mass index (BMI) 25.0-29.9    Hypokalemia    Hypocalcemia    Obesity (BMI 30-39.9)    DNR (do not resuscitate)    History of pulmonary embolism    Cerebrovascular disease    Elevated serum homocysteine level    Arterial occlusion, lower extremity    History of DVT (deep vein thrombosis)     Past Medical History:   Diagnosis Date    Asthma 2022    Dementia 2020    Hyperlipidemia 2015    Hypertension 2015    Memory loss 2019    Mixed anxiety and depressive disorder 2018    Obesity (BMI 30-39.9) 2022    Stage 3 chronic kidney disease 2015     Past Surgical History:   Procedure Laterality Date    FEMORAL THROMBECTOMY/EMBOLECTOMY Left 2024    Procedure: FEMORAL THROMBECTOMY/EMBOLECTOMY;  Surgeon: Clifton Guy MD;  [facility];  Service: Vascular;  Laterality: Left;      General Information       Row Name 24 1526          Physical Therapy Time and Intention    Document Type therapy note (daily note)  -MS     Mode of Treatment physical therapy;individual therapy  -MS       Row Name 24 1526          General Information    Patient Profile Reviewed yes  -MS     Existing Precautions/Restrictions fall   Exit alarm  -MS     Barriers to Rehab cognitive status  -MS       Row Name 24 1526          Cognition    Orientation Status (Cognition) oriented to;person  Needs orientation to place and situation  -MS       Row Name 24 1526           Safety Issues, Functional Mobility    Comment, Safety Issues/Impairments (Mobility) Gait belt used for safety.  -MS               User Key  (r) = Recorded By, (t) = Taken By, (c) = Cosigned By      Initials Name Provider Type    MS RomeroerGilbert, PT Physical Therapist                   Mobility       Row Name 06/14/24 1527          Bed Mobility    Supine-Sit [place] (Bed Mobility) minimum assist (75% patient effort)  -MS     Sit-Supine [place] (Bed Mobility) minimum assist (75% patient effort)  -MS       Row Name 06/14/24 1527          Sit-Stand Transfer    Sit-Stand [place] (Transfers) minimum assist (75% patient effort);2 person assist  -MS     Assistive Device (Sit-Stand Transfers) walker, front-wheeled  -MS       Row Name 06/14/24 1527          Gait/Stairs (Locomotion)    [place]azard Level (Gait) minimum assist (75% patient effort);2 person assist  -MS     Assistive Device (Gait) walker, front-wheeled  -MS     Distance in Feet (Gait) --  Pt. able to take 4-5 shuffled sidesteps on her RLE at bedside  -MS     Bilateral Gait Deviations forward flexed posture  -MS     Comment, (Gait/Stairs) Verbal/tactile cues given for posture correction.  Pt. remains unable to bear weight on her LLE due to pain, limiting her ability to ambulate  -MS               User Key  (r) = Recorded By, (t) = Taken By, (c) = Cosigned By      Initials Name Provider Type    MS Del Toro Gilbert LLOYD PT Physical Therapist                   Obj/Interventions       Row Name 06/14/24 1528          Motor Skills    Therapeutic Exercise --  BLE ther. ex. program x 10 reps completed (Ankle pumps, Hip Flexion, LAQ's);  Limited in Ankle pumps LLE due to pain but encouraged pt. to continue gentle AROM throughout the day on her own.  -MS               User Key  (r) = Recorded By, (t) = Taken By, (c) = Cosigned By      Initials Name Provider Type    Gilbert Gaytan, PT Physical Therapist                   Goals/Plan    No  documentation.                  Clinical Impression       Row Name 06/14/24 1529          Pain    Pretreatment Pain Rating 8/10  -MS     Posttreatment Pain Rating 8/10  -MS     Pain Location - Side/Orientation Left  -MS     Pain Location - ankle;foot  -MS     Pain Intervention(s) Medication (See MAR);Elevated;Nursing Notified;Repositioned  -MS       Row Name 06/14/24 1529          Positioning and Restraints    Pre-Treatment Position in bed  -MS     Post Treatment Position bed  -MS     In Bed notified nsg;supine;call light within reach;encouraged to call for assist;exit alarm on;with family/caregiver;LLE elevated  -MS               User Key  (r) = Recorded By, (t) = Taken By, (c) = Cosigned By      Initials Name Provider Type    Gilbert Gaytan, PT Physical Therapist                   Outcome Measures       Row Name 06/14/24 1529 06/14/24 0935       How much help from another person do you currently need...    Turning from your back to your side while in flat bed without using bedrails? 3  -MS 4  -CG    Moving from lying on back to sitting on the side of a flat bed without bedrails? 3  -MS 4  -CG    Moving to and from a bed to a chair (including a wheelchair)? 2  -MS 3  -CG    Standing up from a chair using your arms (e.g., wheelchair, bedside chair)? 2  -MS 3  -CG    Climbing 3-5 steps with a railing? 1  -MS 2  -CG    To walk in hospital room? 2  -MS 2  -CG    AM-PAC 6 Clicks Score (PT) 13  -MS 18  -CG    Highest Level of Mobility Goal 4 --> Transfer to chair/commode  -MS 6 --> Walk 10 steps or more  -CG      Row Name 06/14/24 1529 06/14/24 1242       Functional Assessment    Outcome Measure Options AM-PAC 6 Clicks Basic Mobility (PT)  -MS AM-PAC 6 Clicks Daily Activity (OT)  -AG              User Key  (r) = Recorded By, (t) = Taken By, (c) = Cosigned By      Initials Name Provider Type    Gilbert Gaytan, PT Physical Therapist    Fredo Pablo, RN Registered Nurse    Ramesh De Leon, OT  Occupational Therapist                                 Physical Therapy Education       Title: PT OT SLP Therapies (In Progress)       Topic: Physical Therapy (In Progress)       Point: Mobility training (In Progress)       Learning Progress Summary             Patient Acceptance, E,D, NR by MS at 6/14/2024 1529    Acceptance, E,D, VU,NR by MS at 6/13/2024 1109                         Point: Home exercise program (In Progress)       Learning Progress Summary             Patient Acceptance, E,D, NR by MS at 6/14/2024 1529                         Point: Body mechanics (In Progress)       Learning Progress Summary             Patient Acceptance, E,D, NR by MS at 6/14/2024 1529    Acceptance, E,D, VU,NR by MS at 6/13/2024 1109                         Point: Precautions (In Progress)       Learning Progress Summary             Patient Acceptance, E,D, NR by MS at 6/14/2024 1529    Acceptance, E,D, VU,NR by MS at 6/13/2024 1109                                         User Key       Initials Effective Dates Name Provider Type Discipline    MS 06/16/21 -  Gilbert Del Toro, PT Physical Therapist PT                  PT Recommendation and Plan  Planned Therapy Interventions (PT): balance training, bed mobility training, gait training, home exercise program, patient/family education, postural re-education, transfer training, strengthening, ROM (range of motion)  Plan of Care Reviewed With: patient  Outcome Evaluation: Upon entering room, pt. supine in bed, awake, and agreeable to work with P.T. this date despite continued reports of Left ankle/foot pain.  This PM, pt. able to take 4-5 shuffled sidesteps on her RLE at bedside, Min. assist x 2, with use of Rwx. Pt. requires Min. assist x 1 for bed mobility and Min. assist x 2 for sit <-> stand transfers. Pt. remains unable to bear weight on her LLE due to pain (8/10), limiting her ability for regular ambulation.  BLE ther. ex. program x 10 reps completed for general  strengthening.  Verbal/tactile cues given throughout upright mobility for posture correction.  Will continue to progress functional mobility as tolerated.     Time Calculation:         PT Charges       Row Name 06/14/24 1532             Time Calculation    Start Time 1455  -MS      Stop Time 1510  -MS      Time Calculation (min) 15 min  -MS      PT Received On 06/14/24  -MS      PT - Next Appointment 06/15/24  -MS         Time Calculation- PT    Total Timed Code Minutes- PT 14 minute(s)  -MS                User Key  (r) = Recorded By, (t) = Taken By, (c) = Cosigned By      Initials Name Provider Type    Gilbert Gaytan, PT Physical Therapist                  Therapy Charges for Today       Code Description Service Date Service Provider Modifiers Qty    23977862457 HC PT EVAL MOD COMPLEXITY 2 6/13/2024 iGlbert Del Toro, PT GP 1    13629740463 HC PT THERAPEUTIC ACT EA 15 MIN 6/13/2024 Gilbert Del Toro, PT GP 1    24614676505 HC PT THER SUPP EA 15 MIN 6/13/2024 Gilbert Del Toro, PT GP 1    24525455674 HC PT THERAPEUTIC ACT EA 15 MIN 6/14/2024 Gilbert Del Toro, PT GP 1    38466458839 HC PT THER SUPP EA 15 MIN 6/14/2024 Gilbert Del Toro, PT GP 1            PT G-Codes  Outcome Measure Options: AM-PAC 6 Clicks Basic Mobility (PT)  AM-PAC 6 Clicks Score (PT): 13  AM-PAC 6 Clicks Score (OT): 14  PT Discharge Summary  Anticipated Discharge Disposition (PT): home with assist, home with home health, skilled nursing facility (Pending pt. progress)    Gilbert Del Toro, PT  6/14/2024

## 2024-06-14 NOTE — CASE MANAGEMENT/SOCIAL WORK
"Physicians Statement of Medical Necessity for  Ambulance Transportation    GENERAL INFORMATION     Name: Erin Jones  YOB: 1955  Medicare #: [id number]  Transport Date: 6/14/24 (Valid for round trips this date, or for scheduled repetitive trips for 60 days from the date signed below.)  Origin: [facility]  Destination: [facility]  Is the Patient's stay covered under Medicare Part A (PPS/DRG?)Yes  Closest appropriate facility? Yes  If this a hosp-hosp transfer? No  Is this a hospice patient? No    MEDICAL NECESSITY QUESTIONAIRE    Ambulance Transportation is medically necessary only if other means of transportation are contraindicated or would be potentially harmful to the patient.  To meet this requirement, the patient must be either \"bed confined\" or suffer from a condition such that transport by means other than an ambulance is contraindicated by the patient's condition.  The following questions must be answered by the healthcare professional signing below for this form to be valid:     1) Describe the MEDICAL CONDITION (physical and/or mental) of this patient AT THE TIME OF AMBULANCE TRANSPORT that requires the patient to be transported in an ambulance, and why transport by other means is contraindicated by the patient's condition:  Past Medical History:   Diagnosis Date    Asthma 01/27/2022    Dementia 08/20/2020    Hyperlipidemia 09/21/2015    Hypertension 09/21/2015    Memory loss 04/25/2019    Mixed anxiety and depressive disorder 12/03/2018    Obesity (BMI 30-39.9) 01/27/2022    Stage 3 chronic kidney disease 09/21/2015      Past Surgical History:   Procedure Laterality Date    FEMORAL THROMBECTOMY/EMBOLECTOMY Left 6/11/2024    Procedure: FEMORAL THROMBECTOMY/EMBOLECTOMY;  Surgeon: Clifton Guy MD;  Location: [facility];  Service: Vascular;  Laterality: Left;      2) Is this patient \"bed confined\" as defined below?Yes   To be \"bed confined\" the patient must satisfy all " three of the following criteria:  (1) unable to get up from bed without assistance; AND (2) unable to ambulate;  AND (3) unable to sit in a chair or wheelchair.  3) Can this patient safely be transported by car or wheelchair van (I.e., may safely sit during transport, without an attendant or monitoring?)No   4. In addition to completing questions 1-3 above, please check any of the following conditions that apply*:          *Note: supporting documentation for any boxes checked must be maintained in the patient's medical records Unable to tolerate seated position for time needed to transport and Unable to sit in a chair or wheelchair due to decubitus ulcers or other wounds      SIGNATURE OF PHYSICIAN OR OTHER AUTHORIZED HEALTHCARE PROFESSIONAL    I certify that the above information is true and correct based on my evaluation of this patient, and represent that the patient requires transport by ambulance and that other forms of transport are contraindicated.  I understand that this information will be used by the Centers for Medicare and Medicaid Services (CMS) to support the determiniation of medical necessity for ambulance services, and I represent that I have personal knowledge of the patient's condition at the time of transport.    x   If this box is checked, I also certify that the patient is physically or mentally incapable of signing the ambulance service's claim form and that the institution with which I am affiliated has furnished care, services or assistance to the patient.  My signature below is made on behalf of the patient pursuant to 42 .36(b)(4). In accordance with 42 .37, the specific reason(s) that the patient is physically or mentally incapable of signing the claim for is as follows: dementia    Signature of Physician or Healthcare Professional  Date/Time:   6/14/24     (For Scheduled repetitive transport, this form is not valid for transports performed more than 60 days after this  date).                                                                                                                                            --------------------------------------------------------------------------------------------  Printed Name and Credentials of Physician or Authorized Healthcare Professional     *Form must be signed by patient's attending physician for scheduled, repetitive transports,.  For non-repetitive ambulance transports, if unable to obtain the signature of the attending physician, any of the following may sign (please select below):     Physician  Clinical Nurse Specialist  Registered Nurse     Physician Assistant  Discharge Planner  Licensed Practical Nurse     Nurse Practitioner

## 2024-06-14 NOTE — PLAN OF CARE
Goal Outcome Evaluation:  Plan of Care Reviewed With: patient, spouse        Progress: no change  Outcome Evaluation: 68 year old Female presented to [facility] and is s/p Left Femoral Embolectomy and Left Anterior/Posterior Tibial Artery Embolectomy.  Prior to admission pt. was independent with functional mobility and ADLS with no AD use.  Currently presents with decreased strength, balance, and func mob limited by pain in LLE.  Pt. was able to perform bed mob with min A, STS min-mod A with RW and bed > recliner transfer with shuffled steps and heavy UE use through RW unable to weight bear through LLE 2/2 pain. Pt. will benefit from skilled OT services to address the deficits mentioned above. OT rec dc to IPR to maximize rehab potential.      Anticipated Discharge Disposition (OT): inpatient rehabilitation facility

## 2024-06-14 NOTE — THERAPY EVALUATION
Patient Name: Erin Jones  : 1955    MRN: [id number]                              Today's Date: 2024       Admit Date: 2024    Visit Dx:     ICD-10-CM ICD-9-CM   1. Ischemic leg  I99.8 459.9   2. DVT (deep venous thrombosis)  I82.409 453.40     Patient Active Problem List   Diagnosis    Moderate late onset Alzheimer's dementia with anxiety    Asthma    Chest pain    History of anxiety state    Hyperlipidemia    Hypertension    Memory loss    Mixed anxiety and depressive disorder    Stage 3 chronic kidney disease    Overweight with body mass index (BMI) 25.0-29.9    Hypokalemia    Hypocalcemia    Obesity (BMI 30-39.9)    DNR (do not resuscitate)    History of pulmonary embolism    Cerebrovascular disease    Elevated serum homocysteine level    Arterial occlusion, lower extremity     Past Medical History:   Diagnosis Date    Asthma 2022    Dementia 2020    Hyperlipidemia 2015    Hypertension 2015    Memory loss 2019    Mixed anxiety and depressive disorder 2018    Obesity (BMI 30-39.9) 2022    Stage 3 chronic kidney disease 2015     Past Surgical History:   Procedure Laterality Date    FEMORAL THROMBECTOMY/EMBOLECTOMY Left 2024    Procedure: FEMORAL THROMBECTOMY/EMBOLECTOMY;  Surgeon: Clifton Guy MD;  Location: [facility];  Service: Vascular;  Laterality: Left;      General Information       Row Name 24 1234          OT Time and Intention    Document Type evaluation  -AG     Mode of Treatment individual therapy;occupational therapy  -AG       Row Name 24 1234          General Information    Patient Profile Reviewed yes  -AG     Prior Level of Function independent:  -AG     Existing Precautions/Restrictions fall  -AG     Barriers to Rehab none identified  -AG       Row Name 24 1234          Living Environment    People in Home spouse  -AG       Row Name 24 1234          Cognition    Orientation Status  (Cognition) oriented x 3  -AG       Row Name 06/14/24 1234          Safety Issues, Functional Mobility    Safety Issues Affecting Function (Mobility) safety precaution awareness;positioning of assistive device  -AG     Impairments Affecting Function (Mobility) balance;endurance/activity tolerance;strength;pain  -AG               User Key  (r) = Recorded By, (t) = Taken By, (c) = Cosigned By      Initials Name Provider Type    AG Ramesh Gomez OT Occupational Therapist                     Mobility/ADL's       Row Name 06/14/24 1235          Bed Mobility    Bed Mobility supine-sit  -AG     Supine-Sit [place] (Bed Mobility) minimum assist (75% patient effort);1 person assist;verbal cues  -AG     Assistive Device (Bed Mobility) bed rails;head of bed elevated  -AG     Comment, (Bed Mobility) excess time  -AG       Row Name 06/14/24 1235          Transfers    Transfers bed-chair transfer;sit-stand transfer  -AG       Row Name 06/14/24 1235          Bed-Chair Transfer    Bed-Chair [place] (Transfers) minimum assist (75% patient effort);1 person assist;verbal cues  -AG     Assistive Device (Bed-Chair Transfers) walker, front-wheeled  -AG       Row Name 06/14/24 1235          Sit-Stand Transfer    Sit-Stand [place] (Transfers) minimum assist (75% patient effort);1 person assist;moderate assist (50% patient effort)  -AG     Assistive Device (Sit-Stand Transfers) walker, front-wheeled  -AG       Row Name 06/14/24 1235          Functional Mobility    Functional Mobility- Comment shuffled steps unable to bear weight through LLE 2/2 pain  -AG       Row Name 06/14/24 1235          Activities of Daily Living    BADL Assessment/Intervention upper body dressing;lower body dressing;grooming;toileting  -AG       Row Name 06/14/24 1235          Upper Body Dressing Assessment/Training    [place] Level (Upper Body Dressing) upper body dressing skills;don;front opening garment;set up  -AG     Position (Upper Body  Dressing) edge of bed sitting  -AG       Row Name 06/14/24 1235          Lower Body Dressing Assessment/Training    [place] Level (Lower Body Dressing) lower body dressing skills;don;socks;dependent (less than 25% patient effort)  -AG     Position (Lower Body Dressing) edge of bed sitting  -AG       Row Name 06/14/24 1235          Grooming Assessment/Training    [place] Level (Grooming) grooming skills;wash face, hands;set up  -AG     Position (Grooming) edge of bed sitting  -AG       Row Name 06/14/24 1235          Toileting Assessment/Training    [place] Level (Toileting) dependent (less than 25% patient effort)  -AG     Comment, (Toileting) purewick - encouraged to transfer to [place] with staff assist  -AG               User Key  (r) = Recorded By, (t) = Taken By, (c) = Cosigned By      Initials Name Provider Type    AG Ramesh Gomez OT Occupational Therapist                   Obj/Interventions       Row Name 06/14/24 1238          Sensory Assessment (Somatosensory)    Sensory Assessment (Somatosensory) sensation intact  -AG       [facility] Name 06/14/24 1238          Vision Assessment/Intervention    Visual Impairment/Limitations WFL  -AG       [facility] Name 06/14/24 1238          Range of Motion Comprehensive    General Range of Motion no range of motion deficits identified  -AG       [facility] Name 06/14/24 1238          Strength Comprehensive (MMT)    Comment, General Manual Muscle Testing (MMT) Assessment generalized weakness  -AG       Row Name 06/14/24 1238          Motor Skills    Motor Skills functional endurance  -AG     Functional Endurance poor  -AG       Row Name 06/14/24 1238          Balance    Balance Assessment sitting dynamic balance;sitting static balance;sit to stand dynamic balance;standing static balance;standing dynamic balance  -AG     Static Sitting Balance supervision  -AG     Dynamic Sitting Balance standby assist  -AG     Position, Sitting Balance unsupported;sitting edge of bed  -AG      Static Standing Balance minimal assist  -AG     Dynamic Standing Balance minimal assist  -AG     Position/Device Used, Standing Balance supported;walker, front-wheeled  -AG     Balance Interventions sitting;standing  -AG               User Key  (r) = Recorded By, (t) = Taken By, (c) = Cosigned By      Initials Name Provider Type    Ramesh De Leon OT Occupational Therapist                   Goals/Plan       Row Name 06/14/24 1242          Bed Mobility Goal 1 (OT)    Activity/Assistive Device (Bed Mobility Goal 1, OT) bed mobility activities, all  -AG     [place] Level/Cues Needed (Bed Mobility Goal 1, OT) modified independence  -AG     Time Frame (Bed Mobility Goal 1, OT) short term goal (STG);2 weeks  -AG     Progress/Outcomes (Bed Mobility Goal 1, OT) goal ongoing  -       Row Name 06/14/24 1242          Transfer Goal 1 (OT)    Activity/Assistive Device (Transfer Goal 1, OT) transfers, all  -AG     [place] Level/Cues Needed (Transfer Goal 1, OT) modified independence  -AG     Time Frame (Transfer Goal 1, OT) short term goal (STG);2 weeks  -AG     Progress/Outcome (Transfer Goal 1, OT) goal ongoing  -       Row Name 06/14/24 1242          Dressing Goal 1 (OT)    Activity/Device (Dressing Goal 1, OT) dressing skills, all  -AG     [place]/Cues Needed (Dressing Goal 1, OT) modified independence  -AG     Time Frame (Dressing Goal 1, OT) short term goal (STG);2 weeks  -AG     Progress/Outcome (Dressing Goal 1, OT) goal ongoing  -       Row Name 06/14/24 1242          Toileting Goal 1 (OT)    Activity/Device (Toileting Goal 1, OT) toileting skills, all  -AG     [place] Level/Cues Needed (Toileting Goal 1, OT) modified independence  -AG     Time Frame (Toileting Goal 1, OT) short term goal (STG);2 weeks  -AG     Progress/Outcome (Toileting Goal 1, OT) goal ongoing  -       Row Name 06/14/24 1242          Grooming Goal 1 (OT)    Activity/Device (Grooming Goal 1, OT) grooming skills, all   -AG     [place] (Grooming Goal 1, OT) modified independence  -AG     Time Frame (Grooming Goal 1, OT) short term goal (STG);2 weeks  -AG     Progress/Outcome (Grooming Goal 1, OT) goal ongoing  -AG       Row Name 06/14/24 1242          Therapy Assessment/Plan (OT)    Planned Therapy Interventions (OT) activity tolerance training;functional balance retraining;occupation/activity based interventions;ROM/therapeutic exercise;IADL retraining;adaptive equipment training;BADL retraining;neuromuscular control/coordination retraining;patient/caregiver education/training;transfer/mobility retraining;strengthening exercise  -AG               User Key  (r) = Recorded By, (t) = Taken By, (c) = Cosigned By      Initials Name Provider Type    AG Ramesh Gomez, SALINAS Occupational Therapist                   Clinical Impression       Row Name 06/14/24 1235          Pain Assessment    Pretreatment Pain Rating 7/10  -AG     Posttreatment Pain Rating 7/10  -AG     Pain Location - Side/Orientation Left  -AG     Pain Location - ankle;foot  -AG     Pain Intervention(s) Medication (See MAR);Repositioned;Rest;Elevated  -AG       Row Name 06/14/24 1231          Plan of Care Review    Plan of Care Reviewed With patient;spouse  -AG     Progress no change  -AG     Outcome Evaluation 68 year old Female presented to [facility] and is s/p Left Femoral Embolectomy and Left Anterior/Posterior Tibial Artery Embolectomy.  Prior to admission pt. was independent with functional mobility and ADLS with no AD use.  Currently presents with decreased strength, balance, and func mob limited by pain in LLE.  Pt. was able to perform bed mob with min A, STS min-mod A with RW and bed > recliner transfer with shuffled steps and heavy UE use through RW unable to weight bear through LLE 2/2 pain. Pt. will benefit from skilled OT services to address the deficits mentioned above. OT rec dc to IPR to maximize rehab potential.  -AG       Row Name 06/14/24 9980           Therapy Assessment/Plan (OT)    Rehab Potential (OT) good, to achieve stated therapy goals  -     Criteria for Skilled Therapeutic Interventions Met (OT) yes;meets criteria;skilled treatment is necessary  -AG     Therapy Frequency (OT) 5 times/wk  -AG       Row Name 06/14/24 1239          Therapy Plan Review/Discharge Plan (OT)    Anticipated Discharge Disposition (OT) inpatient rehabilitation facility  -       Row Name 06/14/24 1239          Vital Signs    O2 Delivery Pre Treatment room air  -AG       Row Name 06/14/24 1239          Positioning and Restraints    Pre-Treatment Position in bed  -AG     Post Treatment Position chair  -AG     In Chair notified nsg;reclined;encouraged to call for assist;exit alarm on;with family/caregiver;call light within reach  -AG               User Key  (r) = Recorded By, (t) = Taken By, (c) = Cosigned By      Initials Name Provider Type    Ramesh De Leon, SALINAS Occupational Therapist                   Outcome Measures       Row Name 06/14/24 1242          How much help from another is currently needed...    Putting on and taking off regular lower body clothing? 1  -AG     Bathing (including washing, rinsing, and drying) 2  -AG     Toileting (which includes using toilet bed pan or urinal) 1  -AG     Putting on and taking off regular upper body clothing 3  -AG     Taking care of personal grooming (such as brushing teeth) 3  -AG     Eating meals 4  -AG     AM-PAC 6 Clicks Score (OT) 14  -AG       Row Name 06/14/24 0935          How much help from another person do you currently need...    Turning from your back to your side while in flat bed without using bedrails? 4  -CG     Moving from lying on back to sitting on the side of a flat bed without bedrails? 4  -CG     Moving to and from a bed to a chair (including a wheelchair)? 3  -CG     Standing up from a chair using your arms (e.g., wheelchair, bedside chair)? 3  -CG     Climbing 3-5 steps with a railing? 2  -CG     To walk  in hospital room? 2  -CG     AM-PAC 6 Clicks Score (PT) 18  -CG     Highest Level of Mobility Goal 6 --> Walk 10 steps or more  -CG       Row Name 06/14/24 1242          Functional Assessment    Outcome Measure Options AM-PAC 6 Clicks Daily Activity (OT)  -AG               User Key  (r) = Recorded By, (t) = Taken By, (c) = Cosigned By      Initials Name Provider Type    CG Fredo Varma, RN Registered Nurse    Ramesh De Leon OT Occupational Therapist                    Occupational Therapy Education       Title: PT OT SLP Therapies (In Progress)       Topic: Occupational Therapy (Done)       Point: ADL training (Done)       Description:   Instruct learner(s) on proper safety adaptation and remediation techniques during self care or transfers.   Instruct in proper use of assistive devices.                  Learning Progress Summary             Patient Acceptance, E,TB, VU by AG at 6/14/2024 1243   Family Acceptance, E,TB, VU by AG at 6/14/2024 1243                         Point: Home exercise program (Done)       Description:   Instruct learner(s) on appropriate technique for monitoring, assisting and/or progressing therapeutic exercises/activities.                  Learning Progress Summary             Patient Acceptance, E,TB, VU by AG at 6/14/2024 1243   Family Acceptance, E,TB, VU by AG at 6/14/2024 1243                         Point: Precautions (Done)       Description:   Instruct learner(s) on prescribed precautions during self-care and functional transfers.                  Learning Progress Summary             Patient Acceptance, E,TB, VU by AG at 6/14/2024 1243   Family Acceptance, E,TB, VU by AG at 6/14/2024 1243                         Point: Body mechanics (Done)       Description:   Instruct learner(s) on proper positioning and spine alignment during self-care, functional mobility activities and/or exercises.                  Learning Progress Summary             Patient Acceptance, E,TB, VU  by  at 6/14/2024 1243   Family Acceptance, E,TB, VU by  at 6/14/2024 1243                                         User Key       Initials Effective Dates Name Provider Type Discipline     01/23/24 -  Ramesh Gomez OT Occupational Therapist OT                  OT Recommendation and Plan  Planned Therapy Interventions (OT): activity tolerance training, functional balance retraining, occupation/activity based interventions, ROM/therapeutic exercise, IADL retraining, adaptive equipment training, BADL retraining, neuromuscular control/coordination retraining, patient/caregiver education/training, transfer/mobility retraining, strengthening exercise  Therapy Frequency (OT): 5 times/wk  Plan of Care Review  Plan of Care Reviewed With: patient, spouse  Progress: no change  Outcome Evaluation: 68 year old Female presented to [facility] and is s/p Left Femoral Embolectomy and Left Anterior/Posterior Tibial Artery Embolectomy.  Prior to admission pt. was independent with functional mobility and ADLS with no AD use.  Currently presents with decreased strength, balance, and func mob limited by pain in LLE.  Pt. was able to perform bed mob with min A, STS min-mod A with RW and bed > recliner transfer with shuffled steps and heavy UE use through RW unable to weight bear through LLE 2/2 pain. Pt. will benefit from skilled OT services to address the deficits mentioned above. OT rec dc to IPR to maximize rehab potential.     Time Calculation:   Evaluation Complexity (OT)  Review Occupational Profile/Medical/Therapy History Complexity: expanded/moderate complexity  Assessment, Occupational Performance/Identification of Deficit Complexity: 3-5 performance deficits  Clinical Decision Making Complexity (OT): detailed assessment/moderate complexity  Overall Complexity of Evaluation (OT): moderate complexity     Time Calculation- OT       Row Name 06/14/24 1243             Time Calculation- OT    OT Start Time 0942  -      OT Stop  Time 1004  -AG      OT Time Calculation (min) 22 min  -AG      Total Timed Code Minutes- OT 17 minute(s)  -AG      OT Received On 06/14/24  -AG      OT - Next Appointment 06/17/24  -AG      OT Goal Re-Cert Due Date 06/28/24  -AG         Timed Charges    72723 - OT Self Care/Mgmt Minutes 17  -AG         Untimed Charges    OT Eval/Re-eval Minutes 5  -AG         Total Minutes    Timed Charges Total Minutes 17  -AG      Untimed Charges Total Minutes 5  -AG       Total Minutes 22  -AG                User Key  (r) = Recorded By, (t) = Taken By, (c) = Cosigned By      Initials Name Provider Type    AG Ramesh Gomez, OT Occupational Therapist                  Therapy Charges for Today       Code Description Service Date Service Provider Modifiers Qty    30735918039 HC OT SELF CARE/MGMT/TRAIN EA 15 MIN 6/14/2024 Ramesh Gomez, OT GO 1    59574149672 HC OT EVAL MOD COMPLEXITY 2 6/14/2024 Ramesh Gomez OT GO 1                 Ramesh Gomez OT  6/14/2024

## 2024-06-14 NOTE — CASE MANAGEMENT/SOCIAL WORK
Post-Acute Authorization Submission       Post Acute Pre-Cert Documentation  Request Submitted by Facility - Type:: Hospital  Date Post Acute Pre-Cert Inititated per Facility: 06/14/24  Verification from Payer: Yes  Date Post Acute Pre-Cert Completed: 06/14/24  Accepting Facility: [facility] Discharge Date Requested: 06/14/24  All Clinicals Submitted?: Yes  Had Accepting Facility at Time of Submission: Yes  Response Received from Insurance?: Approval  Response Communicated to:: , Accepting Facility Liaison, Accepting Facility Auth Department  Authorization Number:: APPROVED AUTH ID [id number]  Post Acute Pre-Cert Initiated Comment: Therese BLUM RN,CCP              Therese Stuart, RN

## 2024-06-14 NOTE — CASE MANAGEMENT/SOCIAL WORK
Continued Stay Note  [facility]     Patient Name: Erin Jones  MRN: [id number]  Today's Date: 6/14/2024    Admit Date: 6/11/2024    Plan: [facility]   Discharge Plan       Row Name 06/14/24 1447       Plan    Plan [facility]    Patient/Family in Agreement with Plan yes    Plan Comments Spoke with pt's daughter Candace on the phone. She confirmed the plan is [facility] for SNF. Spoke with MD who confirmed pt is ready for discharge. Precert started and approved. Called Aleks with [facility] and daughter Candace to let them know. Daughter will transport. No further needs identified at this time. THIERRY Meza RN                   Discharge Codes    No documentation.                 Expected Discharge Date and Time       Expected Discharge Date Expected Discharge Time    Jun 14, 2024               Sravan Westfall RN

## 2024-06-14 NOTE — PROGRESS NOTES
[facility]   Surgical Progress Note    Patient Name: Erin Jonse  : 1955  MRN: [id number]  Date of admission: 2024  Surgical Procedures Since Admission:  Procedure(s):  FEMORAL THROMBECTOMY/EMBOLECTOMY  Surgeon:  Clifton Guy MD  Status:  3 Days Post-Op  -------------------    Subjective   Subjective     Chief Complaint: Peripheral arterial disease with acute cold lower extremity.    History of Present Illness   Patient resting comfortably.  Denies chest pain or shortness of breath.  Decreased motor and sensation to the left foot persist.    Review of Systems    Objective   Objective     Vitals:   Temp:  [98.2 °F (36.8 °C)-98.9 °F (37.2 °C)] 98.9 °F (37.2 °C)  Heart Rate:  [82-91] 85  Resp:  [16] 16  BP: (100-138)/(62-84) 138/77  Output by Drain (mL) 24 0701 - 24 1900 24 1901 - 24 0700 24 0701 - 24 1030 Range Total   Patient has no LDAs of requested type attached.       Physical Exam  Constitutional:       Appearance: She is well-developed.   Pulmonary:      Effort: Pulmonary effort is normal. No respiratory distress.   Abdominal:      General: There is no distension.      Palpations: Abdomen is soft.   Neurological:      Mental Status: She is alert and oriented to person, place, and time.     Diminished motor sensation in the left foot with reasonable cap refill in the left great toe.  All compartments of the left calf are soft without tenderness.  Patient now with multiphasic mid to distal peroneal artery signal.     Result Review    Result Review:  I have personally reviewed the results from the time of this admission to 2024 10:30 EDT and agree with these findings:  [x]  Laboratory list / accordion  []  Microbiology  []  Radiology  []  EKG/Telemetry   []  Cardiology/Vascular   []  Pathology  []  Old records  []  Other:  Most notable findings include: Creatinine 1.03.      Assessment & Plan   Assessment / Plan     Brief Patient Summary:  Erin BOND  Robert is a 68 y.o. female who acute on chronic left lower extremity ischemia related to likely a chronically embolizing left iliac thrombus.    Active Hospital Problems:   Active Hospital Problems    Diagnosis    • **Arterial occlusion, lower extremity    • Asthma    • History of pulmonary embolism    • History of anxiety state    • Memory loss    • Mixed anxiety and depressive disorder    • Hypertension    • Stage 3 chronic kidney disease      Plan:   6/11/2024: Open left femoral embolectomy with anterior posterior tibial artery embolectomies.    6/12/2024: Postop day 1.  Reasonable cap refill in the left foot but unable to Doppler signals.  Guarded prognosis about viability long-term of left foot.  All revascularization options have been performed.  Will continue heparin infusion for now and allow foot to declare itself.  Patient and family understand risk of limb loss.    6/13/2024: Postop day 2.  Remains with fairly good cap refill more on the medial foot than the lateral foot.  Still absent anterior and posterior tibial artery signals however the peroneal artery signal is found today and is multiphasic.  Long discussion with patient and family,  and daughter, about complexities of the case as well as plans.  Current plan is await to transition from IV heparin to EliquisThis will be in addition to aspirin 81 mg and atorvastatin.  Will allow left foot to recover maximally and demarcate.  Ultimately unclear of long-term prognosis of the left foot however more optimistic today than yesterday.  Okay to DC Henry catheter from my standpoint.  Will ask physical and Occupational Therapy to see the patient.  May benefit from inpatient rehabilitation/nursing home.    6/14/2024: Postoperative day 3.  Overall fairly decent perfusion to the left foot.  She remains with some foot drop.  Fascial compartments remain soft.  She is tolerating Eliquis without any clinical signs of bleeding.  Discharge planning ongoing.   From a vascular standpoint okay to be discharged home or to rehab whenever medically stable.  If she goes to rehab the dressing from her left groin should be removed on Tuesday of next week.    VTE Prophylaxis:  Pharmacologic VTE prophylaxis orders are present.        Clifton Guy MD

## 2024-06-14 NOTE — PLAN OF CARE
Goal Outcome Evaluation:  Plan of Care Reviewed With: patient           Outcome Evaluation: Upon entering room, pt. supine in bed, awake, and agreeable to work with P.T. this date despite continued reports of Left ankle/foot pain.  This PM, pt. able to take 4-5 shuffled sidesteps on her RLE at bedside, Min. assist x 2, with use of Rwx. Pt. requires Min. assist x 1 for bed mobility and Min. assist x 2 for sit <-> stand transfers. Pt. remains unable to bear weight on her LLE due to pain (8/10), limiting her ability for regular ambulation.  BLE ther. ex. program x 10 reps completed for general strengthening.  Verbal/tactile cues given throughout upright mobility for posture correction.  Will continue to progress functional mobility as tolerated.      Anticipated Discharge Disposition (PT): home with assist, home with home health, skilled nursing facility (Pending pt. progress)

## 2024-06-14 NOTE — DISCHARGE PLACEMENT REQUEST
"Erin Jones (68 y.o. Female)       Date of Birth   1955    Social Security Number       Address   [address] [address]    Home Phone       MRN   [id number]       [facility]    Marital Status                               Admission Date   24    Admission Type   Emergency    Admitting Provider   Tana Reyes MD    Attending Provider   Jacklyn Sibley MD    Department, Room/Bed   [address], E554/1       Discharge Date       Discharge Disposition   Skilled Nursing Facility (DC - External)    Discharge Destination                                 Attending Provider: Jacklyn Sibley MD    Allergies: No Known Allergies    Isolation: None   Infection: None   Code Status: CPR    Ht: 165.1 cm (65\")   Wt: 90.1 kg (198 lb 10.2 oz)    Admission Cmt: None   Principal Problem: Arterial occlusion, lower extremity [I70.209]                   Active Insurance as of 2024       Primary Coverage       Payor Plan Insurance Group Employer/Plan Group    HUMANA MEDICARE REPLACEMENT HUMANA MEDICARE REPLACEMENT [id number]       Payor Plan Address Payor Plan Phone Number Payor Plan Fax Number Effective Dates    [address] [address] [phone]  2023 - None Entered    [facility] [id number]         Subscriber Name Subscriber Birth Date Member ID       ERIN JONES 1955 [id number]                     Emergency Contacts        (Rel.) Home Phone Work Phone Mobile Phone    MIRELLA REYES (Daughter) -- -- [phone]    Clay Jones (Spouse) -- -- [phone]                 Discharge Summary        Jacklyn Sibley MD at 24 1530              Patient Name: Erin Jones  : 1955  MRN: [id number]    Date of Admission: 2024  Date of Discharge:  2024  Primary Care Physician: Laurie De La Rosa APRN      Discharge Diagnoses     Active Hospital Problems    Diagnosis  POA    **Arterial occlusion, lower extremity [I70.209]  Yes "    History of DVT (deep vein thrombosis) [Z86.718]  Not Applicable    Asthma [J45.909]  Yes    History of pulmonary embolism [Z86.711]  Yes    Dementia [F03.90]  Yes    History of anxiety state [Z91.89]  Yes    Memory loss [R41.3]  Yes    Mixed anxiety and depressive disorder [F41.8]  Yes    Hypertension [I10]  Yes    Stage 3 chronic kidney disease [N18.30]  Yes      Resolved Hospital Problems   No resolved problems to display.        Hospital Course     Brief admission history and physical.  Please refer to the H&P for full details.  A pleasant 68 years old female with a past history of dementia/asthma/dyslipidemia/DVT/hypertension/mood disorder/stage III chronic renal failure who presented to the emergency department with severe pain in the left lower extremity associated with bluish discoloration.  No trauma.  Physical examination on admission included an afebrile patient with stable vital signs.  Orientation x 2 only.  Coldness with bluish discoloration of the left foot with decreased capillary refill and decreased range of movement.  Hospital course.  Initial ER evaluation included a CBC that was normal.  INR normal.  PTT normal.  CMP normal except a glucose of 119, creatinine 1.18, CO2 of 21.5, GFR 45.  CT a of the abdominal aorta and bilateral iliofemoral runoff revealed complete occlusion of the left popliteal artery thrombus that is not occlusive in the left common iliac artery 50% and less stenosis of the right renal artery.  Patient was subsequently admitted.  I will address the hospital course in a problem-oriented manner as below.  1.  Acute on chronic left lower extremity ischemia.  Patient was started on heparin and aspirin.  An emergent vascular surgery consultation was obtained and the patient underwent embolectomy of multiple arteries in the left lower extremity on 6/11/2024.  She was maintained subsequently on IV heparin and aspirin.  Her left foot pain improved so did have her cyanosis and  coldness but tach and did not completely resolved.  She was switched to p.o. Eliquis and tolerated that fairly well.  Final regimen includes Eliquis/aspirin/statin.  Her LDL was 42.  A1c was normal.  Vascular surgery released the patient for discharge with close follow-up.  2.  History of DVT and pulmonary embolism.  There was no clinical evidence of PE or DVT during this admission.  She remained on anticoagulation.  3.  History of CVA/dyslipidemia/dementia.  CNS examination remained without focal deficits.  Mentation remained stable with orientation x 2 only.  Patient was maintained on her fenofibrate/Lipitor/aspirin/Aricept/Namenda.  4.  History of hypertension.  Blood pressure remained under good control with no evidence of angina or congestive heart failure.  She remained on no blood pressure medication.  5.  Stage IIIa chronic renal failure.  This remained stable and actually has improved on IV fluid during this admission.  Continue to monitor BMP as an outpatient.  Patient remained euvolemic throughout the admission.  She did have a transient decreased urine output that has resolved.  6.  History of asthma.  Respiratory status remained stable on Symbicort      At the time of discharge patient was hemodynamically stable.  She is being discharged to skilled facility for further care and physical therapy.  Follow-up with vascular surgery in 2 weeks.  Family was informed about the patient diagnosis and prognosis.    Consultants     Consult Orders (all) (From admission, onward)       Start     Ordered    06/12/24 0653  Inpatient Case Management  Consult  Once        Provider:  (Not yet assigned)    06/12/24 0653    06/11/24 1541  [facility] (on-call MD unless specified) Details  Once        Specialty:  Hospitalist  Provider:  Tana Reyes MD    06/11/24 1540    06/11/24 1516  Inpatient Vascular Surgery Consult  Once        Specialty:  Vascular Surgery  Provider:  Clifton Guy MD    06/11/24 1517                   Procedures     Imaging Results (All)       Procedure Component Value Units Date/Time    Arteriogram (Autofinalize) [[id number]] Resulted: 06/11/24 1932     Updated: 06/11/24 1948    Narrative:      This procedure was auto-finalized with no dictation required.    CT Angio Abdominal Aorta Bilateral Iliofem Runoff [[id number]] Collected: 06/11/24 1456     Updated: 06/11/24 1523    Narrative:      CT ANGIO ABDOMINAL AORTA BILAT ILIOFEM RUNOFF-     DATE OF EXAM: 6/11/2024 1:55 PM     INDICATION: Left lower extremity pale and discolored with concern for  peripheral arterial disease.     COMPARISON: None available.     TECHNIQUE: Multiple contiguous axial images were acquired through the  abdomen, pelvis, and each lower extremity following the intravenous  administration of 100 mL of Isovue-370. Reformatted coronal and sagittal  sequences and a 3D reconstruction image were also reviewed. Radiation  dose reduction techniques were utilized, including automated exposure  control and exposure modulation based on body size.     FINDINGS:  The abdominal aorta is normal in size. Mild calcified and noncalcified  atherosclerotic disease in the distal abdominal aorta. The celiac axis,  SMA, left renal artery, and JEFFERSON are widely patent. Mild calcified  atherosclerotic disease at the origin of the right renal artery  resulting in less than 50% stenosis of the origin of the right renal  artery. Nonocclusive thrombus in the left common iliac artery. The right  common iliac artery is widely patent. The bilateral external and  internal iliac arteries, common femoral arteries, superficial femoral  arteries, and deep femoral arteries are widely patent. Occlusion of the  left popliteal artery with minimal short segment reconstitution of the  posterior tibialis and peroneal arteries at the level of the mid to  distal lower leg and minimal contrast enhancement of the diminutive  medial and lateral plantar arteries at the level of the  left midfoot and  forefoot. The right popliteal artery is widely patent with three-vessel  runoff to the ankle.     Mild multifocal bibasilar subsegmental atelectasis and/or scarring.  Trace pericardial fluid.     The liver, gallbladder, spleen, pancreas, and adrenal glands are  unremarkable. Simple appearing 1 cm cyst at the upper pole of the right  kidney. Additional tiny subcentimeter cysts in both kidneys. Focal  scarring at the lower pole of the left kidney. The urinary bladder is  nondistended. Urinary bladder wall thickening is likely accentuated by  under distention. Status post hysterectomy. The adnexa are unremarkable.     Tiny hiatal hernia. Mild colorectal stool. Circumferential  low-attenuation thickening of the wall of the cecum, ascending colon,  and proximal transverse colon could reflect sequela of chronic  inflammatory bowel disease. Colonic diverticula, without CT evidence of  diverticulitis. No bowel obstruction. The appendix is normal.     No free fluid in the abdomen or pelvis. No free intraperitoneal air. No  pathologically enlarged lymph nodes in the abdomen or pelvis.     Mild lumbar levoscoliosis and mild to moderate multilevel lumbar  spondylosis. Mild to moderate bilateral hip and SI joint DJD and mild to  moderate DJD of the pubic symphysis. Severe DJD at the patellofemoral  compartment of each knee. Sclerotic foci in the distal right femur,  probably benign bone islands. Mild to moderate chondromalacia/DJD in the  medial and lateral compartments of each knee. Subchondral cystic changes  in the medial femoral condyle, likely related to overlying  chondromalacia/DJD. Mild to moderate DJD at each tibiotalar joint.  Moderate to severe multifocal DJD at each midfoot. Each midfoot is  fairly well aligned. Moderate to severe DJD at the great toe MTP joint.  No evidence of a significant joint effusion. No discrete intra-articular  body is identified. No significant muscular fatty atrophy.        Impression:         1. Complete occlusion of the mid left popliteal artery with minimal  short segment reconstitution of the posterior tibialis and peroneal  arteries at the mid to distal calf and minimal contrast enhancement of  the medial and lateral plantar arteries at the level of the midfoot and  forefoot.  2. Nonocclusive thrombus in the left common iliac artery.  3. Calcified atherosclerotic disease at the origin of the right renal  artery resulting in less than 50% stenosis.  4. Three-vessel runoff to the right ankle.     Results were discussed over the phone with the ordering ER clinician,  Dr. Gilbert Wright, at 3:15 p.m. on 6/11/2024     This report was finalized on 6/11/2024 3:20 PM by Luis Manuel Lehman MD on  Workstation: [id number]               Pertinent Labs     Results from last 7 days   Lab Units 06/12/24  1102 06/12/24  0528 06/11/24  1354   WBC 10*3/mm3 11.75* 12.89* 8.13   HEMOGLOBIN g/dL 14.0 12.9 14.7   PLATELETS 10*3/mm3 202 249 246     Results from last 7 days   Lab Units 06/14/24  0510 06/13/24  1058 06/12/24  0528 06/11/24  1354   SODIUM mmol/L 139 137 134* 136   POTASSIUM mmol/L 3.6 3.5 4.0 3.6   CHLORIDE mmol/L 107 106 103 103   CO2 mmol/L 21.7* 21.6* 21.0* 21.5*   BUN mg/dL 7* 10 12 13   CREATININE mg/dL 1.03* 1.22* 1.06* 1.28*   GLUCOSE mg/dL 106* 129* 143* 119*   Estimated Creatinine Clearance: 57.9 mL/min (A) (by C-G formula based on SCr of 1.03 mg/dL (H)).  Results from last 7 days   Lab Units 06/12/24  0528 06/11/24  1354   ALBUMIN g/dL 3.5 3.8   BILIRUBIN mg/dL 0.3 0.7   ALK PHOS U/L 109 132*   AST (SGOT) U/L 16 25   ALT (SGPT) U/L 12 14     Results from last 7 days   Lab Units 06/14/24  0510 06/13/24  1058 06/12/24  0528 06/11/24  1354   CALCIUM mg/dL 8.1* 8.5* 8.5* 9.5   ALBUMIN g/dL  --   --  3.5 3.8           Results from last 7 days   Lab Units 06/13/24  1058   CHOLESTEROL mg/dL 106   TRIGLYCERIDES mg/dL 100   HDL CHOL mg/dL 45   LDL CHOL mg/dL 42         Imaging Results (Last 24  Hours)       ** No results found for the last 24 hours. **            Test Results Pending at Discharge         Discharge Exam   Physical Exam  Vitals.  Temperature 98.1 a pulse of 77 respirate rate of 21 and blood pressure 136/68 and O2 sats of 98% on room air  General.  Middle-aged female who appears older than stated age.  She is alert and oriented times 2 out of 4 (baseline according to her family).  In no apparent pain/distress/diaphoresis.  Eyes.  Pupils equal round and reactive.  Intact extraocular musculature.  No pallor or jaundice  Oral cavity.  Moist mucous membrane.  Neck.  Supple.  No JVD.  No lymphadenopathy or thyromegaly.  Cardiovascular regular rate and rhythm with no gallops or murmurs  Chest.  Clear to auscultation bilaterally with no added sounds.  Poor bilateral air entry  Abdomen.  Soft lax.  No tenderness.  No organomegaly.  No guarding or rebound.  Extremities.  dressed lower aspect of the left leg.  Left foot is slightly cold and slightly cyanotic but better than admission.   Dorsalis pedis and posterior tibial are not felt in the left lower extremity.  Delayed capillary refill.  Groin.  Addressed left groin.  CNS.  No acute focal neurological deficits  Discharge Details        Discharge Medications        New Medications        Instructions Start Date   apixaban 5 MG tablet tablet  Commonly known as: ELIQUIS   5 mg, Oral, Every 12 Hours Scheduled      aspirin 81 MG EC tablet   81 mg, Oral, Daily   Start Date: Elise 15, 2024     famotidine 40 MG tablet  Commonly known as: Pepcid   40 mg, Oral, Daily      HYDROcodone-acetaminophen 5-325 MG per tablet  Commonly known as: NORCO   1 tablet, Oral, Every 6 Hours PRN             Continue These Medications        Instructions Start Date   albuterol sulfate  (90 Base) MCG/ACT inhaler  Commonly known as: PROVENTIL HFA;VENTOLIN HFA;PROAIR HFA   inhale TWO puffs BY MOUTH EVERY 4 HOURS AS NEEDED FOR WHEEZING      atorvastatin 10 MG tablet  Commonly  known as: Lipitor   10 mg, Oral, Daily      cetirizine 10 MG tablet  Commonly known as: zyrTEC   10 mg, Oral, Daily      donepezil 10 MG tablet  Commonly known as: ARICEPT   10 mg, Oral, Nightly      fenofibrate 160 MG tablet   1 tablet, Oral, Daily      folic acid 1 MG tablet  Commonly known as: FOLVITE   TAKE ONE TABLET BY MOUTH DAILY      memantine 10 MG tablet  Commonly known as: NAMENDA   10 mg, Oral, 2 Times Daily      Symbicort 80-4.5 MCG/ACT inhaler  Generic drug: budesonide-formoterol   INHALE TWO puffs TWICE DAILY      vitamin B-6 100 MG tablet  Commonly known as: PYRIDOXINE   100 mg, Oral, Daily             Stop These Medications      Aspirin Buf(CaCarb-MgCarb-MgO) 81 MG tablet     Xarelto 20 MG tablet  Generic drug: rivaroxaban              No Known Allergies      Discharge Disposition:  Condition: Stable    Diet:   Diet Order   Procedures    Diet: Cardiac; Healthy Heart (2-3 Na+); Fluid Consistency: Thin (IDDSI 0)       Activity:   Activity Instructions       Activity as Tolerated      Other Activity Instructions      Activity Instructions: PT and OT to evaluate and treat    Up WIth Assist              Counseling : No nonsteroidal anti-inflammatory medication    CODE STATUS:    Code Status and Medical Interventions:   Ordered at: 06/11/24 1651     Code Status (Patient has no pulse and is not breathing):    CPR (Attempt to Resuscitate)     Medical Interventions (Patient has pulse or is breathing):    Full Support       Future Appointments   Date Time Provider Department Center   7/9/2024  2:30 PM Haley Rios APRN [facility]     Additional Instructions for the Follow-ups that You Need to Schedule       Call MD With Problems / Concerns   As directed      Instructions: Call MD or return to ER if increasing pain in the left lower extremity.  Worsening cyanosis of the left lower extremity.  Worsening cold numbness of the left lower extremity.  Chest pain.  Fever or chills.  Lower extremity  wounds.  Shortness of breath.    Order Comments: Instructions: Call MD or return to ER if increasing pain in the left lower extremity.  Worsening cyanosis of the left lower extremity.  Worsening cold numbness of the left lower extremity.  Chest pain.  Fever or chills.  Lower extremity wounds.  Shortness of breath.         Discharge Follow-up with PCP   As directed       Currently Documented PCP:    Laurie De La Rosa APRN    PCP Phone Number:    [phone]     Follow Up Details: Primary MD.  1 week.  Acute on chronic left lower extremity ischemia status post embolectomy/history of DVT/CVA/dyslipidemia/hypertension/chronic renal failure 3A/asthma/dementia        Discharge Follow-up with Specified Provider: Vascular surgery; 2 Weeks   As directed      To: Vascular surgery   Follow Up: 2 Weeks   Follow Up Details: Acute on chronic left lower extremity ischemia s/p embolectomy of left lower extremity artery               Contact information for follow-up providers       Laurie De La Rosa APRN .    Specialty: Family Medicine  Why: Primary MD.  1 week.  Acute on chronic left lower extremity ischemia status post embolectomy/history of DVT/CVA/dyslipidemia/hypertension/chronic renal failure 3A/asthma/dementia  Contact information:  [address] [id number]  [phone]                       Contact information for after-discharge care       Destination       [facility] .    Service: Skilled Nursing  Contact information:  [address] [place], [place] [address] [phone]                                     Time Spent on Discharge:  Greater than 30 minutes      Jacklyn Sibley MD  [facility]  06/14/24  15:30 EDT     Electronically signed by Jacklyn Sibley MD at 06/14/24 2915

## 2024-06-14 NOTE — DISCHARGE SUMMARY
Patient Name: Erin Jones  : 1955  MRN: [id number]    Date of Admission: 2024  Date of Discharge:  2024  Primary Care Physician: Laurie De La Rosa, JUAN      Discharge Diagnoses     Active Hospital Problems    Diagnosis  POA    **Arterial occlusion, lower extremity [I70.209]  Yes    History of DVT (deep vein thrombosis) [Z86.718]  Not Applicable    Asthma [J45.909]  Yes    History of pulmonary embolism [Z86.711]  Yes    Dementia [F03.90]  Yes    History of anxiety state [Z91.89]  Yes    Memory loss [R41.3]  Yes    Mixed anxiety and depressive disorder [F41.8]  Yes    Hypertension [I10]  Yes    Stage 3 chronic kidney disease [N18.30]  Yes      Resolved Hospital Problems   No resolved problems to display.        Hospital Course     Brief admission history and physical.  Please refer to the H&P for full details.  A pleasant 68 years old female with a past history of dementia/asthma/dyslipidemia/DVT/hypertension/mood disorder/stage III chronic renal failure who presented to the emergency department with severe pain in the left lower extremity associated with bluish discoloration.  No trauma.  Physical examination on admission included an afebrile patient with stable vital signs.  Orientation x 2 only.  Coldness with bluish discoloration of the left foot with decreased capillary refill and decreased range of movement.  Hospital course.  Initial ER evaluation included a CBC that was normal.  INR normal.  PTT normal.  CMP normal except a glucose of 119, creatinine 1.18, CO2 of 21.5, GFR 45.  CT a of the abdominal aorta and bilateral iliofemoral runoff revealed complete occlusion of the left popliteal artery thrombus that is not occlusive in the left common iliac artery 50% and less stenosis of the right renal artery.  Patient was subsequently admitted.  I will address the hospital course in a problem-oriented manner as below.  1.  Acute on chronic left lower extremity ischemia.  Patient was  started on heparin and aspirin.  An emergent vascular surgery consultation was obtained and the patient underwent embolectomy of multiple arteries in the left lower extremity on 6/11/2024.  She was maintained subsequently on IV heparin and aspirin.  Her left foot pain improved so did have her cyanosis and coldness but tach and did not completely resolved.  She was switched to p.o. Eliquis and tolerated that fairly well.  Final regimen includes Eliquis/aspirin/statin.  Her LDL was 42.  A1c was normal.  Vascular surgery released the patient for discharge with close follow-up.  2.  History of DVT and pulmonary embolism.  There was no clinical evidence of PE or DVT during this admission.  She remained on anticoagulation.  3.  History of CVA/dyslipidemia/dementia.  CNS examination remained without focal deficits.  Mentation remained stable with orientation x 2 only.  Patient was maintained on her fenofibrate/Lipitor/aspirin/Aricept/Namenda.  4.  History of hypertension.  Blood pressure remained under good control with no evidence of angina or congestive heart failure.  She remained on no blood pressure medication.  5.  Stage IIIa chronic renal failure.  This remained stable and actually has improved on IV fluid during this admission.  Continue to monitor BMP as an outpatient.  Patient remained euvolemic throughout the admission.  She did have a transient decreased urine output that has resolved.  6.  History of asthma.  Respiratory status remained stable on Symbicort      At the time of discharge patient was hemodynamically stable.  She is being discharged to skilled facility for further care and physical therapy.  Follow-up with vascular surgery in 2 weeks.  Family was informed about the patient diagnosis and prognosis.    Consultants     Consult Orders (all) (From admission, onward)       Start     Ordered    06/12/24 0681  Inpatient Case Management  Consult  Once        Provider:  (Not yet assigned)     06/12/24 0653    06/11/24 1541  [facility] (on-call MD unless specified) Details  Once        Specialty:  Hospitalist  Provider:  Tana Reyes MD    06/11/24 1540    06/11/24 1516  Inpatient Vascular Surgery Consult  Once        Specialty:  Vascular Surgery  Provider:  Clifton Guy MD    06/11/24 1517                  Procedures     Imaging Results (All)       Procedure Component Value Units Date/Time    Arteriogram (Autofinalize) [[id number]] Resulted: 06/11/24 1932     Updated: 06/11/24 1948    Narrative:      This procedure was auto-finalized with no dictation required.    CT Angio Abdominal Aorta Bilateral Iliofem Runoff [[id number]] Collected: 06/11/24 1456     Updated: 06/11/24 1523    Narrative:      CT ANGIO ABDOMINAL AORTA BILAT ILIOFEM RUNOFF-     DATE OF EXAM: 6/11/2024 1:55 PM     INDICATION: Left lower extremity pale and discolored with concern for  peripheral arterial disease.     COMPARISON: None available.     TECHNIQUE: Multiple contiguous axial images were acquired through the  abdomen, pelvis, and each lower extremity following the intravenous  administration of 100 mL of Isovue-370. Reformatted coronal and sagittal  sequences and a 3D reconstruction image were also reviewed. Radiation  dose reduction techniques were utilized, including automated exposure  control and exposure modulation based on body size.     FINDINGS:  The abdominal aorta is normal in size. Mild calcified and noncalcified  atherosclerotic disease in the distal abdominal aorta. The celiac axis,  SMA, left renal artery, and JEFFERSON are widely patent. Mild calcified  atherosclerotic disease at the origin of the right renal artery  resulting in less than 50% stenosis of the origin of the right renal  artery. Nonocclusive thrombus in the left common iliac artery. The right  common iliac artery is widely patent. The bilateral external and  internal iliac arteries, common femoral arteries, superficial femoral  arteries, and deep femoral  arteries are widely patent. Occlusion of the  left popliteal artery with minimal short segment reconstitution of the  posterior tibialis and peroneal arteries at the level of the mid to  distal lower leg and minimal contrast enhancement of the diminutive  medial and lateral plantar arteries at the level of the left midfoot and  forefoot. The right popliteal artery is widely patent with three-vessel  runoff to the ankle.     Mild multifocal bibasilar subsegmental atelectasis and/or scarring.  Trace pericardial fluid.     The liver, gallbladder, spleen, pancreas, and adrenal glands are  unremarkable. Simple appearing 1 cm cyst at the upper pole of the right  kidney. Additional tiny subcentimeter cysts in both kidneys. Focal  scarring at the lower pole of the left kidney. The urinary bladder is  nondistended. Urinary bladder wall thickening is likely accentuated by  under distention. Status post hysterectomy. The adnexa are unremarkable.     Tiny hiatal hernia. Mild colorectal stool. Circumferential  low-attenuation thickening of the wall of the cecum, ascending colon,  and proximal transverse colon could reflect sequela of chronic  inflammatory bowel disease. Colonic diverticula, without CT evidence of  diverticulitis. No bowel obstruction. The appendix is normal.     No free fluid in the abdomen or pelvis. No free intraperitoneal air. No  pathologically enlarged lymph nodes in the abdomen or pelvis.     Mild lumbar levoscoliosis and mild to moderate multilevel lumbar  spondylosis. Mild to moderate bilateral hip and SI joint DJD and mild to  moderate DJD of the pubic symphysis. Severe DJD at the patellofemoral  compartment of each knee. Sclerotic foci in the distal right femur,  probably benign bone islands. Mild to moderate chondromalacia/DJD in the  medial and lateral compartments of each knee. Subchondral cystic changes  in the medial femoral condyle, likely related to overlying  chondromalacia/DJD. Mild to  moderate DJD at each tibiotalar joint.  Moderate to severe multifocal DJD at each midfoot. Each midfoot is  fairly well aligned. Moderate to severe DJD at the great toe MTP joint.  No evidence of a significant joint effusion. No discrete intra-articular  body is identified. No significant muscular fatty atrophy.       Impression:         1. Complete occlusion of the mid left popliteal artery with minimal  short segment reconstitution of the posterior tibialis and peroneal  arteries at the mid to distal calf and minimal contrast enhancement of  the medial and lateral plantar arteries at the level of the midfoot and  forefoot.  2. Nonocclusive thrombus in the left common iliac artery.  3. Calcified atherosclerotic disease at the origin of the right renal  artery resulting in less than 50% stenosis.  4. Three-vessel runoff to the right ankle.     Results were discussed over the phone with the ordering ER clinician,  Dr. Gilbert Wright, at 3:15 p.m. on 6/11/2024     This report was finalized on 6/11/2024 3:20 PM by Luis Manuel Lehman MD on  Workstation: [id number]               Pertinent Labs     Results from last 7 days   Lab Units 06/12/24  1102 06/12/24  0528 06/11/24  1354   WBC 10*3/mm3 11.75* 12.89* 8.13   HEMOGLOBIN g/dL 14.0 12.9 14.7   PLATELETS 10*3/mm3 202 249 246     Results from last 7 days   Lab Units 06/14/24  0510 06/13/24  1058 06/12/24  0528 06/11/24  1354   SODIUM mmol/L 139 137 134* 136   POTASSIUM mmol/L 3.6 3.5 4.0 3.6   CHLORIDE mmol/L 107 106 103 103   CO2 mmol/L 21.7* 21.6* 21.0* 21.5*   BUN mg/dL 7* 10 12 13   CREATININE mg/dL 1.03* 1.22* 1.06* 1.28*   GLUCOSE mg/dL 106* 129* 143* 119*   Estimated Creatinine Clearance: 57.9 mL/min (A) (by C-G formula based on SCr of 1.03 mg/dL (H)).  Results from last 7 days   Lab Units 06/12/24  0528 06/11/24  1354   ALBUMIN g/dL 3.5 3.8   BILIRUBIN mg/dL 0.3 0.7   ALK PHOS U/L 109 132*   AST (SGOT) U/L 16 25   ALT (SGPT) U/L 12 14     Results from last 7 days    Lab Units 06/14/24  0510 06/13/24  1058 06/12/24  0528 06/11/24  1354   CALCIUM mg/dL 8.1* 8.5* 8.5* 9.5   ALBUMIN g/dL  --   --  3.5 3.8           Results from last 7 days   Lab Units 06/13/24  1058   CHOLESTEROL mg/dL 106   TRIGLYCERIDES mg/dL 100   HDL CHOL mg/dL 45   LDL CHOL mg/dL 42         Imaging Results (Last 24 Hours)       ** No results found for the last 24 hours. **            Test Results Pending at Discharge         Discharge Exam   Physical Exam  Vitals.  Temperature 98.1 a pulse of 77 respirate rate of 21 and blood pressure 136/68 and O2 sats of 98% on room air  General.  Middle-aged female who appears older than stated age.  She is alert and oriented times 2 out of 4 (baseline according to her family).  In no apparent pain/distress/diaphoresis.  Eyes.  Pupils equal round and reactive.  Intact extraocular musculature.  No pallor or jaundice  Oral cavity.  Moist mucous membrane.  Neck.  Supple.  No JVD.  No lymphadenopathy or thyromegaly.  Cardiovascular regular rate and rhythm with no gallops or murmurs  Chest.  Clear to auscultation bilaterally with no added sounds.  Poor bilateral air entry  Abdomen.  Soft lax.  No tenderness.  No organomegaly.  No guarding or rebound.  Extremities.  dressed lower aspect of the left leg.  Left foot is slightly cold and slightly cyanotic but better than admission.   Dorsalis pedis and posterior tibial are not felt in the left lower extremity.  Delayed capillary refill.  Groin.  Addressed left groin.  CNS.  No acute focal neurological deficits  Discharge Details        Discharge Medications        New Medications        Instructions Start Date   apixaban 5 MG tablet tablet  Commonly known as: ELIQUIS   5 mg, Oral, Every 12 Hours Scheduled      aspirin 81 MG EC tablet   81 mg, Oral, Daily   Start Date: Elise 15, 2024     famotidine 40 MG tablet  Commonly known as: Pepcid   40 mg, Oral, Daily      HYDROcodone-acetaminophen 5-325 MG per tablet  Commonly known as:  NORCO   1 tablet, Oral, Every 6 Hours PRN             Continue These Medications        Instructions Start Date   albuterol sulfate  (90 Base) MCG/ACT inhaler  Commonly known as: PROVENTIL HFA;VENTOLIN HFA;PROAIR HFA   inhale TWO puffs BY MOUTH EVERY 4 HOURS AS NEEDED FOR WHEEZING      atorvastatin 10 MG tablet  Commonly known as: Lipitor   10 mg, Oral, Daily      cetirizine 10 MG tablet  Commonly known as: zyrTEC   10 mg, Oral, Daily      donepezil 10 MG tablet  Commonly known as: ARICEPT   10 mg, Oral, Nightly      fenofibrate 160 MG tablet   1 tablet, Oral, Daily      folic acid 1 MG tablet  Commonly known as: FOLVITE   TAKE ONE TABLET BY MOUTH DAILY      memantine 10 MG tablet  Commonly known as: NAMENDA   10 mg, Oral, 2 Times Daily      Symbicort 80-4.5 MCG/ACT inhaler  Generic drug: budesonide-formoterol   INHALE TWO puffs TWICE DAILY      vitamin B-6 100 MG tablet  Commonly known as: PYRIDOXINE   100 mg, Oral, Daily             Stop These Medications      Aspirin Buf(CaCarb-MgCarb-MgO) 81 MG tablet     Xarelto 20 MG tablet  Generic drug: rivaroxaban              No Known Allergies      Discharge Disposition:  Condition: Stable    Diet:   Diet Order   Procedures    Diet: Cardiac; Healthy Heart (2-3 Na+); Fluid Consistency: Thin (IDDSI 0)       Activity:   Activity Instructions       Activity as Tolerated      Other Activity Instructions      Activity Instructions: PT and OT to evaluate and treat    Up WIth Assist              Counseling : No nonsteroidal anti-inflammatory medication    CODE STATUS:    Code Status and Medical Interventions:   Ordered at: 06/11/24 8700     Code Status (Patient has no pulse and is not breathing):    CPR (Attempt to Resuscitate)     Medical Interventions (Patient has pulse or is breathing):    Full Support       Future Appointments   Date Time Provider Department Center   7/9/2024  2:30 PM Haley Rios APRN [facility] [place] [facility]     Additional Instructions for the  Follow-ups that You Need to Schedule       Call MD With Problems / Concerns   As directed      Instructions: Call MD or return to ER if increasing pain in the left lower extremity.  Worsening cyanosis of the left lower extremity.  Worsening cold numbness of the left lower extremity.  Chest pain.  Fever or chills.  Lower extremity wounds.  Shortness of breath.    Order Comments: Instructions: Call MD or return to ER if increasing pain in the left lower extremity.  Worsening cyanosis of the left lower extremity.  Worsening cold numbness of the left lower extremity.  Chest pain.  Fever or chills.  Lower extremity wounds.  Shortness of breath.         Discharge Follow-up with PCP   As directed       Currently Documented PCP:    Laurie De La Rosa APRN    PCP Phone Number:    [phone]     Follow Up Details: Primary MD.  1 week.  Acute on chronic left lower extremity ischemia status post embolectomy/history of DVT/CVA/dyslipidemia/hypertension/chronic renal failure 3A/asthma/dementia        Discharge Follow-up with Specified Provider: Vascular surgery; 2 Weeks   As directed      To: Vascular surgery   Follow Up: 2 Weeks   Follow Up Details: Acute on chronic left lower extremity ischemia s/p embolectomy of left lower extremity artery               Contact information for follow-up providers       Laurie De La Rosa APRN .    Specialty: Family Medicine  Why: Primary MD.  1 week.  Acute on chronic left lower extremity ischemia status post embolectomy/history of DVT/CVA/dyslipidemia/hypertension/chronic renal failure 3A/asthma/dementia  Contact information:  [address] [facility] [address]  [phone]                       Contact information for after-discharge care       Destination       [facility] .    Service: Skilled Nursing  Contact information:  [address] [place] [id number] [phone]                                     Time Spent on Discharge:  Greater than 30  minutes      Jacklyn Sibley MD  [facility]  06/14/24  15:30 EDT

## 2024-06-14 NOTE — CASE MANAGEMENT/SOCIAL WORK
Continued Stay Note  [facility]     Patient Name: Erin Jones  MRN: [id number]  Today's Date: 6/14/2024    Admit Date: 6/11/2024    Plan: [facility]   Discharge Plan       Row Name 06/14/24 1747       Plan    Plan [facility]    Patient/Family in Agreement with Plan yes    Plan Comments [facility] scheduled for 10pm. Updated family. THIERRY Meza RN                   Discharge Codes    No documentation.                 Expected Discharge Date and Time       Expected Discharge Date Expected Discharge Time    Jun 14, 2024               Sravan Westfall RN

## 2024-06-14 NOTE — PLAN OF CARE
Goal Outcome Evaluation:   Patient being discharged to [facility]. Family providing transportation. I have confirmed family is able to transport patient safely as patient is unable to ambulate, only  pivot. Discharge instructions and medication list being sent with family.

## 2024-06-14 NOTE — PLAN OF CARE
Problem: Adult Inpatient Plan of Care  Goal: Plan of Care Review  Outcome: Ongoing, Progressing  Flowsheets (Taken 6/14/2024 0241)  Progress: no change  Plan of Care Reviewed With: patient  Outcome Evaluation: VSS. Pt rested throughout the evening   Goal Outcome Evaluation:  Plan of Care Reviewed With: patient        Progress: no change  Outcome Evaluation: VSS. Pt rested throughout the evening

## 2024-06-14 NOTE — ANESTHESIA POSTPROCEDURE EVALUATION
Patient: Erin BOND Tingle    Procedure Summary       Date: 06/11/24 Room / Location: [facility] OR [facility] /  [facility]    Anesthesia Start: 1659 Anesthesia Stop: 1950    Procedure: FEMORAL THROMBECTOMY/EMBOLECTOMY (Left: Thigh) Diagnosis:     Surgeons: Clifton Guy MD Provider: Lasha Lane MD    Anesthesia Type: general ASA Status: 4 - Emergent            Anesthesia Type: general    Vitals  Vitals Value Taken Time   /84 06/11/24 2130   Temp 36.4 °C (97.5 °F) 06/11/24 1945   Pulse 73 06/11/24 2139   Resp 16 06/11/24 2115   SpO2 96 % 06/11/24 2139   Vitals shown include unfiled device data.        Post Anesthesia Care and Evaluation    No anesthesia care post op

## 2024-06-15 NOTE — NURSING NOTE
Attempted to call report again to [facility] and left message with callback number.

## 2024-06-18 LAB — CREAT BLDA-MCNC: 1.4 MG/DL (ref 0.6–1.3)

## 2024-06-25 ENCOUNTER — OFFICE VISIT (OUTPATIENT)
Age: 69
End: 2024-06-25
Payer: MEDICARE

## 2024-06-25 VITALS — WEIGHT: 198 LBS | BODY MASS INDEX: 32.99 KG/M2 | HEIGHT: 65 IN

## 2024-06-25 DIAGNOSIS — I73.9 PERIPHERAL VASCULAR DISEASE, UNSPECIFIED: Primary | ICD-10-CM

## 2024-06-25 PROCEDURE — 99024 POSTOP FOLLOW-UP VISIT: CPT | Performed by: SURGERY

## 2024-06-25 NOTE — PROGRESS NOTES
"Chief Complaint  Post-op Follow-up, Leg Swelling, and Leg Pain    Subjective        Erin Jones presents to [facility] VASCULAR SURGERY  History of Present Illness    Patient comes in today with significant left foot pain.  They link this to the transport.  The last 10 days in rehab have been good with good physical therapy and minimal pain.    Objective   Vital Signs:  Ht 165.1 cm (65\")   Wt 89.8 kg (198 lb)   BMI 32.95 kg/m²   Estimated body mass index is 32.95 kg/m² as calculated from the following:    Height as of this encounter: 165.1 cm (65\").    Weight as of this encounter: 89.8 kg (198 lb).         Erin Jones  reports that she has never smoked. She has never used smokeless tobacco.      Physical Exam  Constitutional:       Appearance: She is well-developed.   Pulmonary:      Effort: Pulmonary effort is normal. No respiratory distress.   Abdominal:      General: There is no distension.      Palpations: Abdomen is soft.   Neurological:      Mental Status: She is alert and oriented to person, place, and time.        Multiphasic peroneal artery signal.  Acrocyanosis of the forefoot with good cap refill.    Result Review :    The following data was reviewed by: Clifton Guy MD on 06/25/2024:  CBC          9/12/2023    15:52 6/11/2024    13:54 6/12/2024    05:28 6/12/2024    11:02   CBC   WBC 9.5  8.13  12.89  11.75    RBC 4.76  4.83  4.25  4.55    Hemoglobin 14.6  14.7  12.9  14.0    Hematocrit 42.4  42.4  37.1  40.4    MCV 89  87.8  87.3  88.8    MCH 30.7  30.4  30.4  30.8    MCHC 34.4  34.7  34.8  34.7    RDW 13.5  13.1  13.0  13.2    Platelets 272  246  249  202       BMP          6/12/2024    05:28 6/13/2024    10:58 6/14/2024    05:10 6/14/2024    19:06   BMP   BUN 12  10  7     Creatinine 1.06  1.22  1.03     Sodium 134  137  139     Potassium 4.0  3.5  3.6  4.5    Chloride 103  106  107     CO2 21.0  21.6  21.7     Calcium 8.5  8.5  8.1        A1C Last 3 Results          " 9/12/2023    15:52 10/25/2023    15:03 6/12/2024    05:28   HGBA1C Last 3 Results   Hemoglobin A1C 5.5  5.3  5.40          Vascular Surgical History:  6/11/2024: Left femoral embolectomy with left anterior and posterior tibial embolectomies.  Op Note by Clifton Guy MD (06/11/2024 17:36)     Vascular Imaging History:  CT angiogram with runoff:  6/11/2024:  1. Complete occlusion of the mid left popliteal artery with minimal  short segment reconstitution of the posterior tibialis and peroneal  arteries at the mid to distal calf and minimal contrast enhancement of  the medial and lateral plantar arteries at the level of the midfoot and  forefoot.  2. Nonocclusive thrombus in the left common iliac artery.  3. Calcified atherosclerotic disease at the origin of the right renal  artery resulting in less than 50% stenosis.  4. Three-vessel runoff to the right ankle.             Assessment and Plan     Vascular surgery following for:  Peripheral arterial disease with history of    Diagnoses and all orders for this visit:    1. Peripheral vascular disease, unspecified (Primary)    Patient is back for 2-week follow-up from her emergent left leg thrombectomy.  She had been doing pretty well in rehab.  She has had significant discomfort in the left foot from her leg being dependent for the transportation to the office today.  Her incisions are healing nicely.  We discussed the complexities of her case.  She likely had chronic atheroembolization from her left iliac disease to her runoff vessels.  This is produced difficulties with revascularization.  Currently she has a fairly reasonable sounding peroneal signal.  Will see her back in 2 weeks time for recheck and staple and stitch removal.  They understand that if wounds or ulcers or ischemic rest pain develop or persist she may require below-knee amputation.     Vascular medical management: Patient should continue Eliquis 5 mg p.o. twice daily, aspirin 81 mg daily, and  Lipitor 10 mg daily.  Follow Up     Return in about 2 weeks (around 7/9/2024) for Next scheduled follow up.  Patient was given instructions and counseling regarding her condition or for health maintenance advice. Please see specific information pulled into the AVS if appropriate.

## 2024-06-27 RX ORDER — DONEPEZIL HYDROCHLORIDE 10 MG/1
10 TABLET, FILM COATED ORAL NIGHTLY
Qty: 90 TABLET | Refills: 1 | Status: SHIPPED | OUTPATIENT
Start: 2024-06-27

## 2024-06-27 RX ORDER — ATORVASTATIN CALCIUM 10 MG/1
10 TABLET, FILM COATED ORAL DAILY
Qty: 30 TABLET | Refills: 5 | Status: SHIPPED | OUTPATIENT
Start: 2024-06-27

## 2024-06-27 RX ORDER — FENOFIBRATE 160 MG/1
160 TABLET ORAL DAILY
Qty: 90 TABLET | Refills: 1 | Status: SHIPPED | OUTPATIENT
Start: 2024-06-27

## 2024-06-27 RX ORDER — CETIRIZINE HYDROCHLORIDE 10 MG/1
10 TABLET ORAL DAILY
Qty: 30 TABLET | Refills: 5 | Status: SHIPPED | OUTPATIENT
Start: 2024-06-27

## 2024-06-27 RX ORDER — FAMOTIDINE 40 MG/1
40 TABLET, FILM COATED ORAL DAILY
Qty: 30 TABLET | Refills: 5 | Status: SHIPPED | OUTPATIENT
Start: 2024-06-27

## 2024-06-27 RX ORDER — ASPIRIN 81 MG/1
81 TABLET ORAL DAILY
Qty: 30 TABLET | Refills: 3 | Status: SHIPPED | OUTPATIENT
Start: 2024-06-27

## 2024-06-27 RX ORDER — MEMANTINE HYDROCHLORIDE 10 MG/1
10 TABLET ORAL 2 TIMES DAILY
Qty: 180 TABLET | Refills: 1 | Status: SHIPPED | OUTPATIENT
Start: 2024-06-27

## 2024-06-27 RX ORDER — FOLIC ACID 1 MG/1
1000 TABLET ORAL DAILY
Qty: 30 TABLET | Refills: 5 | Status: SHIPPED | OUTPATIENT
Start: 2024-06-27

## 2024-06-27 RX ORDER — MULTIVITAMIN WITH IRON
100 TABLET ORAL DAILY
Qty: 30 TABLET | Refills: 5 | Status: SHIPPED | OUTPATIENT
Start: 2024-06-27

## 2024-06-27 RX ORDER — BUDESONIDE AND FORMOTEROL FUMARATE DIHYDRATE 80; 4.5 UG/1; UG/1
2 AEROSOL RESPIRATORY (INHALATION) 2 TIMES DAILY
Qty: 10.2 G | Refills: 6 | Status: SHIPPED | OUTPATIENT
Start: 2024-06-27

## 2024-07-03 ENCOUNTER — OFFICE VISIT (OUTPATIENT)
Dept: FAMILY MEDICINE CLINIC | Facility: CLINIC | Age: 69
End: 2024-07-03
Payer: MEDICARE

## 2024-07-03 VITALS
HEIGHT: 65 IN | BODY MASS INDEX: 32.99 KG/M2 | TEMPERATURE: 96.9 F | WEIGHT: 198 LBS | OXYGEN SATURATION: 98 % | HEART RATE: 85 BPM | RESPIRATION RATE: 16 BRPM

## 2024-07-03 DIAGNOSIS — I70.209 ATHEROSCLEROSIS OF ARTERIES OF EXTREMITIES: Primary | ICD-10-CM

## 2024-07-03 DIAGNOSIS — F02.B4 MODERATE LATE ONSET ALZHEIMER'S DEMENTIA WITH ANXIETY: ICD-10-CM

## 2024-07-03 DIAGNOSIS — E78.2 MIXED HYPERLIPIDEMIA: ICD-10-CM

## 2024-07-03 DIAGNOSIS — I1A.0 RESISTANT HYPERTENSION: ICD-10-CM

## 2024-07-03 DIAGNOSIS — G30.1 MODERATE LATE ONSET ALZHEIMER'S DEMENTIA WITH ANXIETY: ICD-10-CM

## 2024-07-03 DIAGNOSIS — Z86.718 HISTORY OF DVT (DEEP VEIN THROMBOSIS): ICD-10-CM

## 2024-07-03 DIAGNOSIS — Z86.711 HISTORY OF PULMONARY EMBOLISM: ICD-10-CM

## 2024-07-03 NOTE — PROGRESS NOTES
"Chief Complaint  Hospital Follow Up Visit    Subjective        Erin Jones presents to [facility]  History of Present Illness  This is a 67 yo female here today for follow up on recent hospitalization. She has past history of dementia, asthma, hyperlipidemia, PE, and dementia. She presenting to the ER on 6/11/24 with severe pain in her left lower extremity associated with bluish discoloration.  CT scan of the abdominal aorta and bilateral iliofemoral runoff revealed complete occlusion of the left popliteal artery thrombus that is not occlusive in the left common iliac artery 50% and less stenosis of the right renal artery.  Patient was seen by vascular surgery and  underwent embolectomy of multiple arteries in the left lower extremity.  She was started on eliquis, asa and statin and discharged on 6/14/24 to skilled nursing facility. She is here today with her . She has an appt with vascular surgery next week. She is able to stand with minimal difficulty and can transfer with assistance. Home health is following her in the home with PT and nursing. She does have stiches in her left lower leg and staples to left groin area. She denies any increased pain or signs in infection.         Objective   Vital Signs:  Pulse 85   Temp 96.9 °F (36.1 °C)   Resp 16   Ht 165.1 cm (65\")   Wt 89.8 kg (198 lb)   SpO2 98%   BMI 32.95 kg/m²   Estimated body mass index is 32.95 kg/m² as calculated from the following:    Height as of this encounter: 165.1 cm (65\").    Weight as of this encounter: 89.8 kg (198 lb).               Physical Exam  Vitals and nursing note reviewed.   HENT:      Head: Normocephalic.      Nose: Nose normal.   Eyes:      Pupils: Pupils are equal, round, and reactive to light.   Cardiovascular:      Rate and Rhythm: Normal rate and regular rhythm.      Pulses: Normal pulses.      Heart sounds: Normal heart sounds.   Pulmonary:      Effort: Pulmonary effort is normal. " No respiratory distress.      Breath sounds: Normal breath sounds. No wheezing or rales.   Abdominal:      General: Bowel sounds are normal. There is no distension.      Palpations: Abdomen is soft.      Tenderness: There is no abdominal tenderness.   Musculoskeletal:         General: No swelling.      Cervical back: Neck supple.      Right lower leg: No edema.      Left lower leg: No edema.   Skin:     General: Skin is warm and dry.      Comments: Stables to left groin are intact with no signs of infection. Stiches to left lower leg are intact as well with no infection.    Neurological:      Mental Status: She is alert. She is disoriented.   Psychiatric:         Mood and Affect: Mood normal.       Result Review :                     Assessment and Plan     Diagnoses and all orders for this visit:    1. Atherosclerosis of arteries of extremities (Primary)    2. History of DVT (deep vein thrombosis)    3. History of pulmonary embolism    4. Mixed hyperlipidemia    5. Moderate late onset Alzheimer's dementia with anxiety    6. Resistant hypertension      I have been in contact with vascular surgeon regarding her foot and he plans to see her next week. She has minimal pain. He plans to remove staples in his office the day of appt.  She will continue to be followed by home health  She has an appt with neurology coming up soon as well  We will continue all current regimen for now.   All labs were reviewed and in good range.        Follow Up     No follow-ups on file.  Patient was given instructions and counseling regarding her condition or for health maintenance advice. Please see specific information pulled into the AVS if appropriate.

## 2024-07-05 ENCOUNTER — PATIENT ROUNDING (BHMG ONLY) (OUTPATIENT)
Dept: FAMILY MEDICINE CLINIC | Facility: CLINIC | Age: 69
End: 2024-07-05
Payer: MEDICARE

## 2024-07-05 NOTE — PROGRESS NOTES
"My name is Cole Coats     I am the Practice Manager with   [facility] [address] [address] [phone]      I am messaging to ask you about our practice and your recent visit.     Tell me about your visit with us. What things went well?         We're always looking for ways to make our patients' experiences even better. Do you have recommendations on ways we may improve?       Overall were you satisfied with your first visit to our practice?        Is there anything else I can do for you?     Also, please note that you may receive a survey from \"[employer]\" please take time to fill that out, as it provides important feedback for our office.       Thank you, and have a great day.   "

## 2024-07-09 ENCOUNTER — OFFICE VISIT (OUTPATIENT)
Dept: NEUROLOGY | Facility: CLINIC | Age: 69
End: 2024-07-09
Payer: MEDICARE

## 2024-07-09 ENCOUNTER — OFFICE VISIT (OUTPATIENT)
Age: 69
End: 2024-07-09
Payer: MEDICARE

## 2024-07-09 VITALS — WEIGHT: 198 LBS | HEIGHT: 65 IN | BODY MASS INDEX: 32.99 KG/M2

## 2024-07-09 VITALS
SYSTOLIC BLOOD PRESSURE: 127 MMHG | WEIGHT: 188.1 LBS | DIASTOLIC BLOOD PRESSURE: 80 MMHG | BODY MASS INDEX: 31.34 KG/M2 | HEIGHT: 65 IN | HEART RATE: 78 BPM

## 2024-07-09 DIAGNOSIS — I73.9 PERIPHERAL VASCULAR DISEASE, UNSPECIFIED: Primary | ICD-10-CM

## 2024-07-09 DIAGNOSIS — G30.1 MODERATE LATE ONSET ALZHEIMER'S DEMENTIA WITH AGITATION: Primary | ICD-10-CM

## 2024-07-09 DIAGNOSIS — F02.B11 MODERATE LATE ONSET ALZHEIMER'S DEMENTIA WITH AGITATION: Primary | ICD-10-CM

## 2024-07-09 PROCEDURE — 99024 POSTOP FOLLOW-UP VISIT: CPT | Performed by: SURGERY

## 2024-07-09 PROCEDURE — 1160F RVW MEDS BY RX/DR IN RCRD: CPT | Performed by: SURGERY

## 2024-07-09 PROCEDURE — 1159F MED LIST DOCD IN RCRD: CPT | Performed by: SURGERY

## 2024-07-09 RX ORDER — HYDROCODONE BITARTRATE AND ACETAMINOPHEN 5; 325 MG/1; MG/1
1 TABLET ORAL AS NEEDED
COMMUNITY

## 2024-07-09 RX ORDER — MEMANTINE HYDROCHLORIDE 10 MG/1
10 TABLET ORAL 2 TIMES DAILY
Qty: 180 TABLET | Refills: 1 | Status: SHIPPED | OUTPATIENT
Start: 2024-07-09

## 2024-07-09 RX ORDER — DONEPEZIL HYDROCHLORIDE 10 MG/1
10 TABLET, FILM COATED ORAL NIGHTLY
Qty: 90 TABLET | Refills: 1 | Status: SHIPPED | OUTPATIENT
Start: 2024-07-09

## 2024-07-09 NOTE — PATIENT INSTRUCTIONS
Alzheimer's Disease Caregiver Guide  Alzheimer's disease is a condition that makes a person:  Forget things.  Act differently.  Have trouble paying attention and doing simple tasks.  These things get worse with time. The tips below can help you care for the person.  How to help manage lifestyle changes  Tips to help with symptoms  Be calm and patient.  Give simple, short answers to questions.  Avoid correcting the person in a negative way.  Try not to take things personally, even if the person forgets your name.  Do not argue with the person. This may make the person more upset.  Tips to lessen frustration  Make appointments and do daily tasks when the person is at his or her best.  Take your time. Simple tasks may take longer. Allow plenty of time to complete tasks.  Limit choices for the person.  Involve the person in what you are doing.  Keep things organized:  Keep a daily routine.  Organize medicines in a pillbox for each day of the week.  Keep a calendar in a central location to remind the person of meetings or other activities.  Avoid new or crowded places, if possible.  Use simple words, short sentences, and a calm voice. Only give one direction at a time.  Buy clothes and shoes that are easy to put on and take off.  Try to change the subject if the person becomes frustrated or angry.  Tips to prevent injury    Keep floors clear. Remove rugs, magazine racks, and floor lamps.  Keep hallways well-lit.  Put a handrail and non-slip mat in the bathtub or shower.  Put childproof locks on cabinets that have dangerous items in them. These items include medicine, alcohol, guns, toxic cleaning items, sharp tools, matches, and lighters.  Put locks on doors where the person cannot see or reach them. This helps keep the person from going out of the house and getting lost.  Be ready for emergencies. Keep a list of emergency phone numbers and addresses close by.  Remove car keys and lock garage doors so that the person  does not try to drive.  Bracelets may be worn that track location and identify the person as having memory problems. This should be worn at all times for safety.  Tips for the future    Discuss financial and legal planning early. People with this disease have trouble managing their money as the disease gets worse. Get help from a professional.  Talk about advance directives, safety, and daily care. Take these steps:  Create a living will and choose a power of . This is someone who can make decisions for the person with Alzheimer's disease when he or she can no longer do so.  Discuss driving safety and when to stop driving. The person's doctor can help with this.  If the person lives alone, make sure he or she is safe. Some people need extra help at home. Other people need more care at a nursing home or care center.  How to recognize changes in the person's condition  With this disease, memory problems and confusion slowly get worse. In time, the person may not know his or her friends and family members.  The disease can also cause changes in behavior and mood, such as anxiety or anger. The person may see, hear, taste, smell, or feel things that are not real (hallucinate). These changes can come on all of a sudden. They may happen in response to something such as:  Pain.  An infection.  Changes in temperature or noise.  Too much stimulation.  Feeling lost or scared.  Medicines.  Where to find support  Find out about services that can provide short-term care (respite care). These can allow you to take a break when you need it.  Join a support group near you. These groups can help you:  Learn ways to manage stress.  Share experiences with others.  Get emotional comfort and support.  Learn about caregiving as the disease gets worse.  Know what community resources are available.  Where to find more information  Alzheimer's Association: www.alz.org  Contact a doctor if:  The person has a fever.  The person has a  sudden behavior change that does not get better with calming strategies.  The person is not able to take care of himself or herself at home.  You are no longer able to care for the person.  Get help right away if:  The person has a sudden increase in confusion or new hallucinations.  The person threatens you or anyone else, including himself or herself.  Get help right away if you feel like your loved one may hurt himself or herself or others, or has thoughts about taking his or her own life. Go to your nearest emergency room or:  Call your local emergency services (911 in the [place]  Call the National Suicide Prevention Lifeline at 1-814.182.4051 or 192 in the [place] This is open 24 hours a day.  Text the Crisis Text Line at 843577.  Summary  Alzheimer's disease causes a person to forget things.  A person who has this condition may have trouble doing simple tasks.  Take steps to keep the person from getting hurt. Plan for future care.  You can find support by joining a support group near you.  This information is not intended to replace advice given to you by your health care provider. Make sure you discuss any questions you have with your health care provider.  Document Revised: 07/13/2022 Document Reviewed: 04/05/2021  Elsevier Patient Education © 2024 [employer]vier Inc.

## 2024-07-09 NOTE — PROGRESS NOTES
"Chief Complaint  Dementia    Subjective          Erin Jones presents to [facility]  History of Present Illness  Presents to the office to follow up for dementia. Presents with her daughter.  Continuing to endorse short term memory loss and repetitiveness.  Recently was hospitalized and memory worsened at that time. Increased anxiety, especially regarding leaving the house.       Interval History:   States she was diagnosed with Alzheimer's disease by [facility] neurology about 5-6 years ago.  Was admitted to [facility] in Feb for pulmonary embolism.  States she has difficulty with short term memory.  Endorses repetitiveness in conversations. Denies progression in memory over the last several years.   states memory has remained the same as when she was initially diagnosed.       Objective   Vital Signs:   /80   Pulse 78   Ht 165.1 cm (65\")   Wt 85.3 kg (188 lb 1.6 oz)   BMI 31.30 kg/m²     Physical Exam  HENT:      Head: Normocephalic.   Pulmonary:      Effort: Pulmonary effort is normal.   Neurological:      Mental Status: She is alert and oriented to person, place, and time.      Sensory: Sensation is intact.      Motor: Motor function is intact.      Coordination: Coordination is intact.      Deep Tendon Reflexes: Reflexes are normal and symmetric.      Neurologic Exam     Mental Status   Oriented to person, place, and time.        Result Review :   CBC:  Lab Results   Component Value Date    WBC 11.75 (H) 06/12/2024    RBC 4.55 06/12/2024    HGB 14.0 06/12/2024    HCT 40.4 06/12/2024    MCV 88.8 06/12/2024    MCH 30.8 06/12/2024    MCHC 34.7 06/12/2024    RDW 13.2 06/12/2024     06/12/2024     CMP:  Lab Results   Component Value Date    BUN 7 (L) 06/14/2024    CREATININE 1.03 (H) 06/14/2024    EGFRIFNONA 53 (L) 01/29/2022     06/14/2024    K 4.5 06/14/2024     06/14/2024    CALCIUM 8.1 (L) 06/14/2024    PROTENTOTREF 6.7 09/12/2023 "    ALBUMIN 3.5 06/12/2024    LABGLOBREF 2.4 09/12/2023    BILITOT 0.3 06/12/2024    ALKPHOS 109 06/12/2024    AST 16 06/12/2024    ALT 12 06/12/2024     B12:   Lab Results   Component Value Date    YHNWVFJN37 738 03/16/2023      FOLATE:   Lab Results   Component Value Date    FOLATE >20.0 03/16/2023     TSH/FREE T4:   Lab Results   Component Value Date    TSH 2.910 01/29/2022                Assessment and Plan    Diagnoses and all orders for this visit:    1. Moderate late onset Alzheimer's dementia with agitation (Primary)  Assessment & Plan:  Continue memantine and donepezil. Will add zoloft for dementia induced agitation. Can consider other options such as rexulti in the future if needed..       Other orders  -     memantine (NAMENDA) 10 MG tablet; Take 1 tablet by mouth 2 (Two) Times a Day.  Dispense: 180 tablet; Refill: 1  -     donepezil (ARICEPT) 10 MG tablet; Take 1 tablet by mouth Every Night.  Dispense: 90 tablet; Refill: 1  -     sertraline (Zoloft) 50 MG tablet; Take 1 tablet by mouth Daily. 1/2 tablet for 2 weeks, then increase to 1 tablet  Dispense: 30 tablet; Refill: 3        Follow Up   Return in about 4 months (around 11/9/2024) for memory f/u.  Patient was given instructions and counseling regarding her condition or for health maintenance advice. Please see specific information pulled into the AVS if appropriate.

## 2024-07-09 NOTE — PROGRESS NOTES
"Chief Complaint  Peripheral Vascular Disease and Post-op Follow-up    Subjective        Erin Jones presents to [facility]  History of Present Illness  Uneventful 2-week interval.  Doing better at home than she was at rehab.  Walking with a cane.  No evidence of any infections.  Objective   Vital Signs:  Ht 165.1 cm (65\")   Wt 89.8 kg (198 lb)   BMI 32.95 kg/m²   Estimated body mass index is 32.95 kg/m² as calculated from the following:    Height as of this encounter: 165.1 cm (65\").    Weight as of this encounter: 89.8 kg (198 lb).           Erin Jones  reports that she has never smoked. She has never used smokeless tobacco.        Physical Exam  Constitutional:       Appearance: She is well-developed.   Pulmonary:      Effort: Pulmonary effort is normal. No respiratory distress.   Abdominal:      General: There is no distension.      Palpations: Abdomen is soft.   Neurological:      Mental Status: She is alert and oriented to person, place, and time.     Well-healed left posterior tibial and anterior tibial incisions.  Sutures removed today.  Well-healed left femoral incision.  Staples removed today.    Multiphasic left peroneal artery signal.  Mono to biphasic posterior tibial and dorsalis pedis on the left foot.    Result Review :    The following data was reviewed by: Clifton Guy MD on 07/09/24  CBC          9/12/2023    15:52 6/11/2024    13:54 6/12/2024    05:28 6/12/2024    11:02   CBC   WBC 9.5  8.13  12.89  11.75    RBC 4.76  4.83  4.25  4.55    Hemoglobin 14.6  14.7  12.9  14.0    Hematocrit 42.4  42.4  37.1  40.4    MCV 89  87.8  87.3  88.8    MCH 30.7  30.4  30.4  30.8    MCHC 34.4  34.7  34.8  34.7    RDW 13.5  13.1  13.0  13.2    Platelets 272  246  249  202       BMP          6/12/2024    05:28 6/13/2024    10:58 6/14/2024    05:10 6/14/2024    19:06   BMP   BUN 12  10  7     Creatinine 1.06  1.22  1.03     Sodium 134  137  139     Potassium 4.0  " 3.5  3.6  4.5    Chloride 103  106  107     CO2 21.0  21.6  21.7     Calcium 8.5  8.5  8.1        A1C Last 3 Results          9/12/2023    15:52 10/25/2023    15:03 6/12/2024    05:28   HGBA1C Last 3 Results   Hemoglobin A1C 5.5  5.3  5.40          Vascular Surgical History:  6/11/2024: Left femoral embolectomy with left anterior and posterior tibial embolectomies.  Op Note by Clifton Guy MD (06/11/2024 17:36)      Vascular Imaging History:  CT angiogram with runoff:  6/11/2024:  1. Complete occlusion of the mid left popliteal artery with minimal  short segment reconstitution of the posterior tibialis and peroneal  arteries at the mid to distal calf and minimal contrast enhancement of  the medial and lateral plantar arteries at the level of the midfoot and  forefoot.  2. Nonocclusive thrombus in the left common iliac artery.  3. Calcified atherosclerotic disease at the origin of the right renal  artery resulting in less than 50% stenosis.  4. Three-vessel runoff to the right ankle.            Assessment and Plan     Vascular surgery following for:  Peripheral vascular disease with embolizing lesion off the left common iliac    7/9/2024:        Diagnoses and all orders for this visit:    1. Peripheral vascular disease, unspecified (Primary)  -     Doppler Ankle Brachial Index Single Level CAR; Future    All in all patient is doing well roughly 1 month post emergent left leg revascularization.  There is still some demarcating ischemic changes to her left foot.  I am more pleased today than I was when I saw her 2 weeks ago.  Her big toenails falling off foot.  Most concerning is that of her left third toe.  I recommended continuing Betadine paints to her left toes.  She has good perfusion with peroneal dominance.  Will see her again in 1 month for ongoing follow-up with ABIs.     Vascular medical management:Patient should continue Eliquis 5 mg p.o. twice daily, aspirin 81 mg daily, and Lipitor 10 mg daily.    Follow Up     No follow-ups on file.  Patient was given instructions and counseling regarding her condition or for health maintenance advice. Please see specific information pulled into the AVS if appropriate.

## 2024-07-12 NOTE — ASSESSMENT & PLAN NOTE
Continue memantine and donepezil. Will add zoloft for dementia induced agitation. Can consider other options such as rexulti in the future if needed..

## 2024-07-29 NOTE — TELEPHONE ENCOUNTER
I spoke with patients daughter and she stated that [facility] was refilling this prior and said that you guys talked about either refilling this medication or putting  Tinglphillip on another medication when she ran out if she needed more.

## 2024-08-02 RX ORDER — HYDROCODONE BITARTRATE AND ACETAMINOPHEN 5; 325 MG/1; MG/1
1 TABLET ORAL EVERY 12 HOURS PRN
Qty: 60 TABLET | Refills: 0 | Status: SHIPPED | OUTPATIENT
Start: 2024-08-02

## 2024-08-09 ENCOUNTER — TELEPHONE (OUTPATIENT)
Age: 69
End: 2024-08-09

## 2024-08-09 NOTE — TELEPHONE ENCOUNTER
Elif from [facility] stated that she is with the patient now and the 2nd toe on the left foot has had an increase in redness, swelling and pain. Pt is set to return on 08.27.24 with an DANIEL and f/u with Maríaer. Elif just wanted to make us aware in case any changes needed to be made.

## 2024-08-16 ENCOUNTER — TELEPHONE (OUTPATIENT)
Dept: FAMILY MEDICINE CLINIC | Facility: CLINIC | Age: 69
End: 2024-08-16

## 2024-08-16 RX ORDER — DOXYCYCLINE HYCLATE 100 MG/1
100 CAPSULE ORAL 2 TIMES DAILY
Qty: 20 CAPSULE | Refills: 0 | Status: SHIPPED | OUTPATIENT
Start: 2024-08-16

## 2024-08-20 ENCOUNTER — PRE-ADMISSION TESTING (OUTPATIENT)
Dept: PREADMISSION TESTING | Facility: HOSPITAL | Age: 69
End: 2024-08-20
Payer: MEDICARE

## 2024-08-20 ENCOUNTER — OFFICE VISIT (OUTPATIENT)
Age: 69
End: 2024-08-20
Payer: MEDICARE

## 2024-08-20 ENCOUNTER — HOSPITAL ENCOUNTER (OUTPATIENT)
Facility: HOSPITAL | Age: 69
Discharge: HOME | End: 2024-08-20
Payer: MEDICARE

## 2024-08-20 ENCOUNTER — HOSPITAL ENCOUNTER (OUTPATIENT)
Dept: GENERAL RADIOLOGY | Facility: HOSPITAL | Age: 69
Discharge: HOME | End: 2024-08-20
Payer: MEDICARE

## 2024-08-20 VITALS
SYSTOLIC BLOOD PRESSURE: 118 MMHG | BODY MASS INDEX: 31.72 KG/M2 | WEIGHT: 185.8 LBS | HEART RATE: 73 BPM | DIASTOLIC BLOOD PRESSURE: 72 MMHG | OXYGEN SATURATION: 99 % | HEIGHT: 64 IN | TEMPERATURE: 97.7 F | RESPIRATION RATE: 20 BRPM

## 2024-08-20 VITALS
WEIGHT: 188 LBS | DIASTOLIC BLOOD PRESSURE: 84 MMHG | HEIGHT: 65 IN | SYSTOLIC BLOOD PRESSURE: 124 MMHG | BODY MASS INDEX: 31.32 KG/M2

## 2024-08-20 DIAGNOSIS — I73.9 PERIPHERAL VASCULAR DISEASE, UNSPECIFIED: ICD-10-CM

## 2024-08-20 DIAGNOSIS — M00.9 SEPTIC ARTHRITIS OF LEFT FOOT, DUE TO UNSPECIFIED ORGANISM: ICD-10-CM

## 2024-08-20 DIAGNOSIS — I73.9 PERIPHERAL VASCULAR DISEASE, UNSPECIFIED: Primary | ICD-10-CM

## 2024-08-20 LAB
ANION GAP SERPL CALCULATED.3IONS-SCNC: 10.9 MMOL/L (ref 5–15)
BH CV LOWER ARTERIAL LEFT ABI RATIO: 0.7
BH CV LOWER ARTERIAL LEFT DORSALIS PEDIS SYS MAX: 62
BH CV LOWER ARTERIAL LEFT POST TIBIAL SYS MAX: 94
BH CV LOWER ARTERIAL RIGHT ABI RATIO: 1.12
BH CV LOWER ARTERIAL RIGHT DORSALIS PEDIS SYS MAX: 140
BH CV LOWER ARTERIAL RIGHT POST TIBIAL SYS MAX: 150
BUN SERPL-MCNC: 15 MG/DL (ref 8–23)
BUN/CREAT SERPL: 9.8 (ref 7–25)
CALCIUM SPEC-SCNC: 9.5 MG/DL (ref 8.6–10.5)
CHLORIDE SERPL-SCNC: 104 MMOL/L (ref 98–107)
CO2 SERPL-SCNC: 24.1 MMOL/L (ref 22–29)
CREAT SERPL-MCNC: 1.53 MG/DL (ref 0.57–1)
DEPRECATED RDW RBC AUTO: 45.2 FL (ref 37–54)
EGFRCR SERPLBLD CKD-EPI 2021: 36.9 ML/MIN/1.73
ERYTHROCYTE [DISTWIDTH] IN BLOOD BY AUTOMATED COUNT: 13.2 % (ref 12.3–15.4)
GLUCOSE SERPL-MCNC: 164 MG/DL (ref 65–99)
HCT VFR BLD AUTO: 38 % (ref 34–46.6)
HGB BLD-MCNC: 12.2 G/DL (ref 12–15.9)
MCH RBC QN AUTO: 29.8 PG (ref 26.6–33)
MCHC RBC AUTO-ENTMCNC: 32.1 G/DL (ref 31.5–35.7)
MCV RBC AUTO: 92.7 FL (ref 79–97)
PLATELET # BLD AUTO: 275 10*3/MM3 (ref 140–450)
PMV BLD AUTO: 10.3 FL (ref 6–12)
POTASSIUM SERPL-SCNC: 3.7 MMOL/L (ref 3.5–5.2)
RBC # BLD AUTO: 4.1 10*6/MM3 (ref 3.77–5.28)
SODIUM SERPL-SCNC: 139 MMOL/L (ref 136–145)
UPPER ARTERIAL LEFT ARM BRACHIAL SYS MAX: NORMAL
UPPER ARTERIAL RIGHT ARM BRACHIAL SYS MAX: NORMAL
WBC NRBC COR # BLD AUTO: 8.26 10*3/MM3 (ref 3.4–10.8)

## 2024-08-20 PROCEDURE — 93922 UPR/L XTREMITY ART 2 LEVELS: CPT

## 2024-08-20 PROCEDURE — 71045 X-RAY EXAM CHEST 1 VIEW: CPT

## 2024-08-20 PROCEDURE — 1159F MED LIST DOCD IN RCRD: CPT | Performed by: SURGERY

## 2024-08-20 PROCEDURE — 80048 BASIC METABOLIC PNL TOTAL CA: CPT

## 2024-08-20 PROCEDURE — 85027 COMPLETE CBC AUTOMATED: CPT

## 2024-08-20 PROCEDURE — 93922 UPR/L XTREMITY ART 2 LEVELS: CPT | Performed by: SURGERY

## 2024-08-20 PROCEDURE — 3074F SYST BP LT 130 MM HG: CPT | Performed by: SURGERY

## 2024-08-20 PROCEDURE — 36415 COLL VENOUS BLD VENIPUNCTURE: CPT

## 2024-08-20 PROCEDURE — 1160F RVW MEDS BY RX/DR IN RCRD: CPT | Performed by: SURGERY

## 2024-08-20 PROCEDURE — 99024 POSTOP FOLLOW-UP VISIT: CPT | Performed by: SURGERY

## 2024-08-20 PROCEDURE — 3079F DIAST BP 80-89 MM HG: CPT | Performed by: SURGERY

## 2024-08-20 PROCEDURE — 93005 ELECTROCARDIOGRAM TRACING: CPT

## 2024-08-20 RX ORDER — ACETAMINOPHEN 325 MG/1
975 TABLET ORAL ONCE
Status: CANCELLED | OUTPATIENT
Start: 2024-08-26 | End: 2024-08-20

## 2024-08-20 NOTE — DISCHARGE INSTRUCTIONS
CHLORHEXIDINE CLOTH INSTRUCTIONS  The morning of surgery follow these instructions using the Chlorhexidine cloths you've been given.  These steps reduce bacteria on the body.  Do not use the cloths near your eyes, ears mouth, genitalia or on open wounds.  Throw the cloths away after use but do not try to flush them down a toilet.      Open and remove one cloth at a time from the package.    Leave the cloth unfolded and begin the bathing.  Massage the skin with the cloths using gentle pressure to remove bacteria.  Do not scrub harshly.   Follow the steps below with one 2% CHG cloth per area (6 total cloths).  One cloth for neck, shoulders and chest.  One cloth for both arms, hands, fingers and underarms (do underarms last).  One cloth for the abdomen followed by groin.  One cloth for right leg and foot including between the toes.  One cloth for left leg and foot including between the toes.  The last cloth is to be used for the back of the neck, back and buttocks.    Allow the CHG to air dry 3 minutes on the skin which will give it time to work and decrease the chance of irritation.  The skin may feel sticky until it is dry.  Do not rinse with water or any other liquid or you will lose the beneficial effects of the CHG.  If mild skin irritation occurs, do rinse the skin to remove the CHG.  Report this to the nurse at time of admission.  Do not apply lotions, creams, ointments, deodorants or perfumes after using the clothes. Dress in clean clothes before coming to the hospital.    Take the following medications the morning of surgery:  INHALER, FAMOTIDINE, NAMENDA, AND SERTRALINE    FOLLOW DR EKARNEY'S INSTRUCTIONS REGARDING ELIQUIS       If you are on prescription narcotic pain medication to control your pain you may also take that medication the morning of surgery.      General Instructions:     Do not eat solid food after midnight the night before surgery.  Clear liquids day of surgery are allowed but must be  stopped at least two hours before your hospital arrival time.       Allowed clear liquids      Water, sodas, and tea or coffee with no cream or milk added.       12 to 20 ounces of a clear liquid that contains carbohydrates is recommended.  If non-diabetic, have Gatorade or Powerade.  If diabetic, have G2 or Powerade Zero.     Do not have liquids red in color.  Do not consume chicken, beef, pork or vegetable broth or bouillon cubes of any variety as they are not considered clear liquids and are not allowed.      Infants may have breast milk up to four hours before surgery.  Infants drinking formula may drink formula up to six hours before surgery.   Patients who avoid smoking, chewing tobacco and alcohol for 4 weeks prior to surgery have a reduced risk of post-operative complications.  Quit smoking as many days before surgery as you can.  Do not smoke, use chewing tobacco or drink alcohol the day of surgery.   If applicable bring your C-PAP/ BI-PAP machine in with you to preop day of surgery.  Bring any papers given to you in the doctor’s office.  Wear clean comfortable clothes.  Do not wear contact lenses, false eyelashes or make-up.  Bring a case for your glasses.   Bring crutches or walker if applicable.  Remove all piercings.  Leave jewelry and any other valuables at home.  Hair extensions with metal clips must be removed prior to surgery.  The Pre-Admission Testing nurse will instruct you to bring medications if unable to obtain an accurate list in Pre-Admission Testing.            Preventing a Surgical Site Infection:  For 2 to 3 days before surgery, avoid shaving with a razor because the razor can irritate skin and make it easier to develop an infection.    Any areas of open skin can increase the risk of a post-operative wound infection by allowing bacteria to enter and travel throughout the body.  Notify your surgeon if you have any skin wounds / rashes even if it is not near the expected surgical site.  The  area will need assessed to determine if surgery should be delayed until it is healed.  The night prior to surgery shower using a fresh bar of anti-bacterial soap (such as Dial) and clean washcloth.  Sleep in a clean bed with clean clothing.  Do not allow pets to sleep with you.  Shower on the morning of surgery using a fresh bar of anti-bacterial soap (such as Dial) and clean washcloth.  Dry with a clean towel and dress in clean clothing.  Ask your surgeon if you will be receiving antibiotics prior to surgery.  Make sure you, your family, and all healthcare providers clean their hands with soap and water or an alcohol based hand  before caring for you or your wound.    Day of surgery:  Your arrival time is approximately two hours before your scheduled surgery time.  Please note if you have an early arrival time the surgery doors do not open before 5:00 AM.  Upon arrival, a Pre-op nurse and Anesthesiologist will review your health history, obtain vital signs, and answer questions you may have.  The only belongings needed at this time will be a list of your home medications and if applicable your C-PAP/BI-PAP machine.  A Pre-op nurse will start an IV and you may receive medication in preparation for surgery, including something to help you relax.     Please be aware that surgery does come with discomfort.  We want to make every effort to control your discomfort so please discuss any uncontrolled symptoms with your nurse.   Your doctor will most likely have prescribed pain medications.      If you are going home after surgery you will receive individualized written care instructions before being discharged.  A responsible adult must drive you to and from the hospital on the day of your surgery and ideally stay with you through the night.   .  Discharge prescriptions can be filled by the hospital pharmacy during regular pharmacy hours.  If you are having surgery late in the day/evening your prescription may be  e-prescribed to your pharmacy.  Please verify your pharmacy hours or chose a 24 hour pharmacy to avoid not having access to your prescription because your pharmacy has closed for the day.    If you are staying overnight following surgery, you will be transported to your hospital room following the recovery period.  [facility] has all private rooms.    If you have any questions please call Pre-Admission Testing at [phone].  Deductibles and co-payments are collected on the day of service. Please be prepared to pay the required co-pay, deductible or deposit on the day of service as defined by your plan.    Call your surgeon immediately if you experience any of the following symptoms:  Sore Throat  Shortness of Breath or difficulty breathing  Cough  Chills  Body soreness or muscle pain  Headache  Fever  New loss of taste or smell  Do not arrive for your surgery ill.  Your procedure will need to be rescheduled to another time.  You will need to call your physician before the day of surgery to avoid any unnecessary exposure to hospital staff as well as other patients.

## 2024-08-20 NOTE — PROGRESS NOTES
"Chief Complaint  Peripheral Vascular Disease    Subjective        Erin Jones presents to [facility] VASCULAR SURGERY  History of Present Illness  Currently on antibiotics for left foot infection.  Objective   Vital Signs:  /84   Ht 165.1 cm (65\")   Wt 85.3 kg (188 lb)   BMI 31.28 kg/m²   Estimated body mass index is 31.28 kg/m² as calculated from the following:    Height as of this encounter: 165.1 cm (65\").    Weight as of this encounter: 85.3 kg (188 lb).           Erin Jones  reports that she has never smoked. She has never used smokeless tobacco.        Physical Exam  Constitutional:       Appearance: She is well-developed.   Pulmonary:      Effort: Pulmonary effort is normal. No respiratory distress.   Abdominal:      General: There is no distension.      Palpations: Abdomen is soft.   Neurological:      Mental Status: She is alert and oriented to person, place, and time.     Well-healed left posterior tibial and anterior tibial incisions.  Sutures removed today.  Well-healed left femoral incision.  Staples removed today.    Multiphasic left peroneal artery signal.  Mono to biphasic posterior tibial and dorsalis pedis on the left foot.    Result Review :    The following data was reviewed by: Clifton Guy MD on 08/20/24  CBC          9/12/2023    15:52 6/11/2024    13:54 6/12/2024    05:28 6/12/2024    11:02   CBC   WBC 9.5  8.13  12.89  11.75    RBC 4.76  4.83  4.25  4.55    Hemoglobin 14.6  14.7  12.9  14.0    Hematocrit 42.4  42.4  37.1  40.4    MCV 89  87.8  87.3  88.8    MCH 30.7  30.4  30.4  30.8    MCHC 34.4  34.7  34.8  34.7    RDW 13.5  13.1  13.0  13.2    Platelets 272  246  249  202       BMP          6/12/2024    05:28 6/13/2024    10:58 6/14/2024    05:10 6/14/2024    19:06   BMP   BUN 12  10  7     Creatinine 1.06  1.22  1.03     Sodium 134  137  139     Potassium 4.0  3.5  3.6  4.5    Chloride 103  106  107     CO2 21.0  21.6  21.7     Calcium 8.5  8.5  " 8.1        A1C Last 3 Results          9/12/2023    15:52 10/25/2023    15:03 6/12/2024    05:28   HGBA1C Last 3 Results   Hemoglobin A1C 5.5  5.3  5.40          Vascular Surgical History:  6/11/2024: Left femoral embolectomy with left anterior and posterior tibial embolectomies.  Op Note by Clifton Guy MD (06/11/2024 17:36)      Vascular Imaging History:  CT angiogram with runoff:  6/11/2024:  1. Complete occlusion of the mid left popliteal artery with minimal  short segment reconstitution of the posterior tibialis and peroneal  arteries at the mid to distal calf and minimal contrast enhancement of  the medial and lateral plantar arteries at the level of the midfoot and  forefoot.  2. Nonocclusive thrombus in the left common iliac artery.  3. Calcified atherosclerotic disease at the origin of the right renal  artery resulting in less than 50% stenosis.  4. Three-vessel runoff to the right ankle.            Assessment and Plan     Vascular surgery following for:  Peripheral vascular disease with embolizing lesion off the left common iliac    7/9/2024:      8/20/2024:      Diagnoses and all orders for this visit:    1. Peripheral vascular disease, unspecified (Primary)  -     Case Request; Standing  -     XR Chest 1 View; Future  -     ECG 12 Lead; Future  -     ceFAZolin (ANCEF) 2,000 mg in sodium chloride 0.9 % 100 mL IVPB  -     acetaminophen (TYLENOL) tablet 975 mg  -     Case Request    2. Septic arthritis of left foot, due to unspecified organism    Other orders  -     Follow Anesthesia Guidelines / Protocol; Future  -     Follow Anesthesia Guidelines / Protocol; Standing  -     Chlorhexidine Skin Prep; Standing  -     Place Sequential Compression Device; Standing  -     Provide Patient With Instructions on NPO Status; Future  -     Instruct Patient to Hold Medications (Specify); Future  -     Provide Chlorhexidine Skin Prep Wipes and Instructions; Future  -     Verify NPO Status; Future      Patient  continues to make good strides towards recovery.  She now has what I suspect is open septic arthritis of the proximal interphalangeal joint of the left second toe with left third toe dry gangrene.  Long discussion with patient and family.  Will proceed with toe amputations of those 2 toes.  I also want to ensure that she her foot is maximally revascularized and will perform diagnostic left leg angiogram with possible invention at that time.  All questions answered.       Vascular medical management:Patient should continue Eliquis 5 mg p.o. twice daily, aspirin 81 mg daily, and Lipitor 10 mg daily.   Will hold Eliquis 2 days prior to surgery.  Follow Up     No follow-ups on file.  Patient was given instructions and counseling regarding her condition or for health maintenance advice. Please see specific information pulled into the AVS if appropriate.

## 2024-08-21 LAB
QT INTERVAL: 415 MS
QTC INTERVAL: 422 MS

## 2024-08-26 ENCOUNTER — APPOINTMENT (OUTPATIENT)
Dept: GENERAL RADIOLOGY | Facility: HOSPITAL | Age: 69
End: 2024-08-26
Payer: MEDICARE

## 2024-08-26 ENCOUNTER — ANESTHESIA (OUTPATIENT)
Dept: PERIOP | Facility: HOSPITAL | Age: 69
End: 2024-08-26
Payer: MEDICARE

## 2024-08-26 ENCOUNTER — ANESTHESIA EVENT (OUTPATIENT)
Dept: PERIOP | Facility: HOSPITAL | Age: 69
End: 2024-08-26
Payer: MEDICARE

## 2024-08-26 ENCOUNTER — HOSPITAL ENCOUNTER (OUTPATIENT)
Facility: HOSPITAL | Age: 69
Setting detail: HOSPITAL OUTPATIENT SURGERY
Discharge: HOME | End: 2024-08-26
Attending: SURGERY | Admitting: SURGERY
Payer: MEDICARE

## 2024-08-26 VITALS
WEIGHT: 185.63 LBS | OXYGEN SATURATION: 98 % | DIASTOLIC BLOOD PRESSURE: 72 MMHG | BODY MASS INDEX: 31.69 KG/M2 | HEART RATE: 70 BPM | RESPIRATION RATE: 18 BRPM | HEIGHT: 64 IN | SYSTOLIC BLOOD PRESSURE: 143 MMHG | TEMPERATURE: 98.5 F

## 2024-08-26 DIAGNOSIS — I73.9 PERIPHERAL VASCULAR DISEASE, UNSPECIFIED: ICD-10-CM

## 2024-08-26 PROCEDURE — 25010000002 BUPIVACAINE (PF) 0.5 % SOLUTION: Performed by: ANESTHESIOLOGY

## 2024-08-26 PROCEDURE — 25010000002 LIDOCAINE 1 % SOLUTION 20 ML VIAL: Performed by: SURGERY

## 2024-08-26 PROCEDURE — 76937 US GUIDE VASCULAR ACCESS: CPT | Performed by: SURGERY

## 2024-08-26 PROCEDURE — 75710 ARTERY X-RAYS ARM/LEG: CPT | Performed by: SURGERY

## 2024-08-26 PROCEDURE — 25810000003 LACTATED RINGERS PER 1000 ML: Performed by: ANESTHESIOLOGY

## 2024-08-26 PROCEDURE — 25010000002 HEPARIN (PORCINE) PER 1000 UNITS: Performed by: SURGERY

## 2024-08-26 PROCEDURE — 25010000002 PROTAMINE SULFATE PER 10 MG: Performed by: NURSE ANESTHETIST, CERTIFIED REGISTERED

## 2024-08-26 PROCEDURE — 25010000002 PROPOFOL 500 MG/50ML EMULSION: Performed by: NURSE ANESTHETIST, CERTIFIED REGISTERED

## 2024-08-26 PROCEDURE — 25010000002 MIDAZOLAM PER 1 MG: Performed by: ANESTHESIOLOGY

## 2024-08-26 PROCEDURE — 25510000001 IOPAMIDOL PER 1 ML: Performed by: SURGERY

## 2024-08-26 PROCEDURE — C1769 GUIDE WIRE: HCPCS | Performed by: SURGERY

## 2024-08-26 PROCEDURE — C1876 STENT, NON-COA/NON-COV W/DEL: HCPCS | Performed by: SURGERY

## 2024-08-26 PROCEDURE — 25010000002 FENTANYL CITRATE (PF) 50 MCG/ML SOLUTION: Performed by: NURSE ANESTHETIST, CERTIFIED REGISTERED

## 2024-08-26 PROCEDURE — 25010000002 HEPARIN (PORCINE) PER 1000 UNITS: Performed by: NURSE ANESTHETIST, CERTIFIED REGISTERED

## 2024-08-26 PROCEDURE — C1894 INTRO/SHEATH, NON-LASER: HCPCS | Performed by: SURGERY

## 2024-08-26 PROCEDURE — 25010000002 BUPIVACAINE (PF) 0.25 % SOLUTION 30 ML VIAL: Performed by: SURGERY

## 2024-08-26 PROCEDURE — 88311 DECALCIFY TISSUE: CPT | Performed by: SURGERY

## 2024-08-26 PROCEDURE — 25010000002 FENTANYL CITRATE (PF) 50 MCG/ML SOLUTION: Performed by: ANESTHESIOLOGY

## 2024-08-26 PROCEDURE — C1725 CATH, TRANSLUMIN NON-LASER: HCPCS | Performed by: SURGERY

## 2024-08-26 PROCEDURE — 28810 AMPUTATION TOE & METATARSAL: CPT | Performed by: SURGERY

## 2024-08-26 PROCEDURE — C1887 CATHETER, GUIDING: HCPCS | Performed by: SURGERY

## 2024-08-26 PROCEDURE — S0260 H&P FOR SURGERY: HCPCS | Performed by: SURGERY

## 2024-08-26 PROCEDURE — 25010000002 CEFAZOLIN PER 500 MG: Performed by: SURGERY

## 2024-08-26 PROCEDURE — 37226 PR REVSC OPN/PRQ FEM/POP W/STNT/ANGIOP SM VSL: CPT | Performed by: SURGERY

## 2024-08-26 PROCEDURE — C1760 CLOSURE DEV, VASC: HCPCS | Performed by: SURGERY

## 2024-08-26 PROCEDURE — 88305 TISSUE EXAM BY PATHOLOGIST: CPT | Performed by: SURGERY

## 2024-08-26 PROCEDURE — 75625 CONTRAST EXAM ABDOMINL AORTA: CPT | Performed by: SURGERY

## 2024-08-26 DEVICE — STENT PRB35-07-060-120 PROTEGE EF V07
Type: IMPLANTABLE DEVICE | Site: ARTERY FEMORAL | Status: FUNCTIONAL
Brand: EVERFLEX™ PROTÉGÉ™ EVERFLEX™

## 2024-08-26 RX ORDER — ACETAMINOPHEN 325 MG/1
975 TABLET ORAL ONCE
Status: COMPLETED | OUTPATIENT
Start: 2024-08-26 | End: 2024-08-26

## 2024-08-26 RX ORDER — EPHEDRINE SULFATE 50 MG/ML
INJECTION INTRAVENOUS AS NEEDED
Status: DISCONTINUED | OUTPATIENT
Start: 2024-08-26 | End: 2024-08-26 | Stop reason: SURG

## 2024-08-26 RX ORDER — LIDOCAINE HYDROCHLORIDE 10 MG/ML
0.5 INJECTION, SOLUTION INFILTRATION; PERINEURAL ONCE AS NEEDED
Status: DISCONTINUED | OUTPATIENT
Start: 2024-08-26 | End: 2024-08-26 | Stop reason: HOSPADM

## 2024-08-26 RX ORDER — FENTANYL CITRATE 50 UG/ML
50 INJECTION, SOLUTION INTRAMUSCULAR; INTRAVENOUS ONCE AS NEEDED
Status: COMPLETED | OUTPATIENT
Start: 2024-08-26 | End: 2024-08-26

## 2024-08-26 RX ORDER — FENTANYL CITRATE 50 UG/ML
25 INJECTION, SOLUTION INTRAMUSCULAR; INTRAVENOUS
Status: DISCONTINUED | OUTPATIENT
Start: 2024-08-26 | End: 2024-08-26 | Stop reason: HOSPADM

## 2024-08-26 RX ORDER — PROMETHAZINE HYDROCHLORIDE 25 MG/1
25 SUPPOSITORY RECTAL ONCE AS NEEDED
Status: DISCONTINUED | OUTPATIENT
Start: 2024-08-26 | End: 2024-08-26 | Stop reason: HOSPADM

## 2024-08-26 RX ORDER — IPRATROPIUM BROMIDE AND ALBUTEROL SULFATE 2.5; .5 MG/3ML; MG/3ML
3 SOLUTION RESPIRATORY (INHALATION) ONCE AS NEEDED
Status: DISCONTINUED | OUTPATIENT
Start: 2024-08-26 | End: 2024-08-26 | Stop reason: HOSPADM

## 2024-08-26 RX ORDER — FENTANYL CITRATE 50 UG/ML
INJECTION, SOLUTION INTRAMUSCULAR; INTRAVENOUS AS NEEDED
Status: DISCONTINUED | OUTPATIENT
Start: 2024-08-26 | End: 2024-08-26 | Stop reason: SURG

## 2024-08-26 RX ORDER — HYDRALAZINE HYDROCHLORIDE 20 MG/ML
5 INJECTION INTRAMUSCULAR; INTRAVENOUS
Status: DISCONTINUED | OUTPATIENT
Start: 2024-08-26 | End: 2024-08-26 | Stop reason: HOSPADM

## 2024-08-26 RX ORDER — SODIUM CHLORIDE 0.9 % (FLUSH) 0.9 %
3 SYRINGE (ML) INJECTION EVERY 12 HOURS SCHEDULED
Status: DISCONTINUED | OUTPATIENT
Start: 2024-08-26 | End: 2024-08-26 | Stop reason: HOSPADM

## 2024-08-26 RX ORDER — SODIUM CHLORIDE 0.9 % (FLUSH) 0.9 %
3-10 SYRINGE (ML) INJECTION AS NEEDED
Status: DISCONTINUED | OUTPATIENT
Start: 2024-08-26 | End: 2024-08-26 | Stop reason: HOSPADM

## 2024-08-26 RX ORDER — DIPHENHYDRAMINE HYDROCHLORIDE 50 MG/ML
12.5 INJECTION INTRAMUSCULAR; INTRAVENOUS
Status: DISCONTINUED | OUTPATIENT
Start: 2024-08-26 | End: 2024-08-26 | Stop reason: HOSPADM

## 2024-08-26 RX ORDER — IOPAMIDOL 510 MG/ML
200 INJECTION, SOLUTION INTRAVASCULAR
Status: COMPLETED | OUTPATIENT
Start: 2024-08-26 | End: 2024-08-26

## 2024-08-26 RX ORDER — BUPIVACAINE HYDROCHLORIDE 5 MG/ML
INJECTION, SOLUTION EPIDURAL; INTRACAUDAL
Status: COMPLETED | OUTPATIENT
Start: 2024-08-26 | End: 2024-08-26

## 2024-08-26 RX ORDER — FAMOTIDINE 10 MG/ML
20 INJECTION, SOLUTION INTRAVENOUS ONCE
Status: COMPLETED | OUTPATIENT
Start: 2024-08-26 | End: 2024-08-26

## 2024-08-26 RX ORDER — LIDOCAINE HYDROCHLORIDE 20 MG/ML
INJECTION, SOLUTION INFILTRATION; PERINEURAL AS NEEDED
Status: DISCONTINUED | OUTPATIENT
Start: 2024-08-26 | End: 2024-08-26 | Stop reason: SURG

## 2024-08-26 RX ORDER — EPHEDRINE SULFATE 50 MG/ML
5 INJECTION, SOLUTION INTRAVENOUS ONCE AS NEEDED
Status: DISCONTINUED | OUTPATIENT
Start: 2024-08-26 | End: 2024-08-26 | Stop reason: HOSPADM

## 2024-08-26 RX ORDER — ASPIRIN 81 MG/1
81 TABLET ORAL DAILY
Qty: 30 TABLET | Refills: 11 | Status: SHIPPED | OUTPATIENT
Start: 2024-08-26

## 2024-08-26 RX ORDER — HYDROCODONE BITARTRATE AND ACETAMINOPHEN 7.5; 325 MG/1; MG/1
1 TABLET ORAL EVERY 4 HOURS PRN
Status: DISCONTINUED | OUTPATIENT
Start: 2024-08-26 | End: 2024-08-26 | Stop reason: HOSPADM

## 2024-08-26 RX ORDER — HYDROMORPHONE HYDROCHLORIDE 1 MG/ML
0.25 INJECTION, SOLUTION INTRAMUSCULAR; INTRAVENOUS; SUBCUTANEOUS
Status: DISCONTINUED | OUTPATIENT
Start: 2024-08-26 | End: 2024-08-26 | Stop reason: HOSPADM

## 2024-08-26 RX ORDER — LABETALOL HYDROCHLORIDE 5 MG/ML
5 INJECTION, SOLUTION INTRAVENOUS
Status: DISCONTINUED | OUTPATIENT
Start: 2024-08-26 | End: 2024-08-26 | Stop reason: HOSPADM

## 2024-08-26 RX ORDER — HEPARIN SODIUM 1000 [USP'U]/ML
INJECTION, SOLUTION INTRAVENOUS; SUBCUTANEOUS AS NEEDED
Status: DISCONTINUED | OUTPATIENT
Start: 2024-08-26 | End: 2024-08-26 | Stop reason: SURG

## 2024-08-26 RX ORDER — PROPOFOL 10 MG/ML
INJECTION, EMULSION INTRAVENOUS CONTINUOUS PRN
Status: DISCONTINUED | OUTPATIENT
Start: 2024-08-26 | End: 2024-08-26 | Stop reason: SURG

## 2024-08-26 RX ORDER — NALOXONE HCL 0.4 MG/ML
0.2 VIAL (ML) INJECTION AS NEEDED
Status: DISCONTINUED | OUTPATIENT
Start: 2024-08-26 | End: 2024-08-26 | Stop reason: HOSPADM

## 2024-08-26 RX ORDER — DROPERIDOL 2.5 MG/ML
0.62 INJECTION, SOLUTION INTRAMUSCULAR; INTRAVENOUS
Status: DISCONTINUED | OUTPATIENT
Start: 2024-08-26 | End: 2024-08-26 | Stop reason: HOSPADM

## 2024-08-26 RX ORDER — ONDANSETRON 2 MG/ML
4 INJECTION INTRAMUSCULAR; INTRAVENOUS ONCE AS NEEDED
Status: DISCONTINUED | OUTPATIENT
Start: 2024-08-26 | End: 2024-08-26 | Stop reason: HOSPADM

## 2024-08-26 RX ORDER — PROTAMINE SULFATE 10 MG/ML
INJECTION, SOLUTION INTRAVENOUS AS NEEDED
Status: DISCONTINUED | OUTPATIENT
Start: 2024-08-26 | End: 2024-08-26 | Stop reason: SURG

## 2024-08-26 RX ORDER — HYDROCODONE BITARTRATE AND ACETAMINOPHEN 5; 325 MG/1; MG/1
1 TABLET ORAL ONCE AS NEEDED
Status: COMPLETED | OUTPATIENT
Start: 2024-08-26 | End: 2024-08-26

## 2024-08-26 RX ORDER — ASPIRIN 81 MG/1
81 TABLET ORAL DAILY
Status: DISCONTINUED | OUTPATIENT
Start: 2024-08-26 | End: 2024-08-26 | Stop reason: HOSPADM

## 2024-08-26 RX ORDER — MIDAZOLAM HYDROCHLORIDE 1 MG/ML
0.5 INJECTION INTRAMUSCULAR; INTRAVENOUS
Status: COMPLETED | OUTPATIENT
Start: 2024-08-26 | End: 2024-08-26

## 2024-08-26 RX ORDER — SODIUM CHLORIDE, SODIUM LACTATE, POTASSIUM CHLORIDE, CALCIUM CHLORIDE 600; 310; 30; 20 MG/100ML; MG/100ML; MG/100ML; MG/100ML
9 INJECTION, SOLUTION INTRAVENOUS CONTINUOUS
Status: DISCONTINUED | OUTPATIENT
Start: 2024-08-26 | End: 2024-08-26 | Stop reason: HOSPADM

## 2024-08-26 RX ORDER — FLUMAZENIL 0.1 MG/ML
0.2 INJECTION INTRAVENOUS AS NEEDED
Status: DISCONTINUED | OUTPATIENT
Start: 2024-08-26 | End: 2024-08-26 | Stop reason: HOSPADM

## 2024-08-26 RX ORDER — PROMETHAZINE HYDROCHLORIDE 25 MG/1
25 TABLET ORAL ONCE AS NEEDED
Status: DISCONTINUED | OUTPATIENT
Start: 2024-08-26 | End: 2024-08-26 | Stop reason: HOSPADM

## 2024-08-26 RX ADMIN — SODIUM CHLORIDE, POTASSIUM CHLORIDE, SODIUM LACTATE AND CALCIUM CHLORIDE: 600; 310; 30; 20 INJECTION, SOLUTION INTRAVENOUS at 10:06

## 2024-08-26 RX ADMIN — HYDROCODONE BITARTRATE AND ACETAMINOPHEN 1 TABLET: 5; 325 TABLET ORAL at 12:57

## 2024-08-26 RX ADMIN — PROTAMINE SULFATE 50 MG: 10 INJECTION, SOLUTION INTRAVENOUS at 09:43

## 2024-08-26 RX ADMIN — HEPARIN SODIUM 7500 UNITS: 1000 INJECTION, SOLUTION INTRAVENOUS; SUBCUTANEOUS at 09:26

## 2024-08-26 RX ADMIN — MIDAZOLAM 1 MG: 1 INJECTION INTRAMUSCULAR; INTRAVENOUS at 08:13

## 2024-08-26 RX ADMIN — FENTANYL CITRATE 25 MCG: 50 INJECTION, SOLUTION INTRAMUSCULAR; INTRAVENOUS at 09:15

## 2024-08-26 RX ADMIN — BUPIVACAINE HYDROCHLORIDE 25 ML: 5 INJECTION, SOLUTION EPIDURAL; INTRACAUDAL; PERINEURAL at 08:25

## 2024-08-26 RX ADMIN — SODIUM CHLORIDE 2000 MG: 900 INJECTION INTRAVENOUS at 08:52

## 2024-08-26 RX ADMIN — EPHEDRINE SULFATE 10 MG: 50 INJECTION INTRAVENOUS at 09:33

## 2024-08-26 RX ADMIN — FENTANYL CITRATE 50 MCG: 50 INJECTION INTRAMUSCULAR; INTRAVENOUS at 07:59

## 2024-08-26 RX ADMIN — SODIUM CHLORIDE, POTASSIUM CHLORIDE, SODIUM LACTATE AND CALCIUM CHLORIDE 9 ML/HR: 600; 310; 30; 20 INJECTION, SOLUTION INTRAVENOUS at 07:40

## 2024-08-26 RX ADMIN — FENTANYL CITRATE 25 MCG: 50 INJECTION, SOLUTION INTRAMUSCULAR; INTRAVENOUS at 09:55

## 2024-08-26 RX ADMIN — FAMOTIDINE 20 MG: 10 INJECTION INTRAVENOUS at 07:39

## 2024-08-26 RX ADMIN — ACETAMINOPHEN 325MG 975 MG: 325 TABLET ORAL at 07:39

## 2024-08-26 RX ADMIN — PROPOFOL 100 MCG/KG/MIN: 10 INJECTION, EMULSION INTRAVENOUS at 09:00

## 2024-08-26 RX ADMIN — IOPAMIDOL 60 ML: 510 INJECTION, SOLUTION INTRAVASCULAR at 09:46

## 2024-08-26 RX ADMIN — LIDOCAINE HYDROCHLORIDE 60 MG: 20 INJECTION, SOLUTION INFILTRATION; PERINEURAL at 09:00

## 2024-08-26 RX ADMIN — MIDAZOLAM 1 MG: 1 INJECTION INTRAMUSCULAR; INTRAVENOUS at 07:58

## 2024-08-26 NOTE — ANESTHESIA PROCEDURE NOTES
Peripheral Block      Patient reassessed immediately prior to procedure    Patient location during procedure: pre-op  Reason for block: at surgeon's request and post-op pain management  Performed by  Anesthesiologist: Zachary Bowens MD  Preanesthetic Checklist  Completed: patient identified, IV checked, site marked, risks and benefits discussed, surgical consent, monitors and equipment checked, pre-op evaluation and timeout performed  Prep:  Pt Position: supine  Sterile barriers:gloves, mask, washed/disinfected hands and cap  Prep: ChloraPrep  Patient monitoring: blood pressure monitoring, continuous pulse oximetry and EKG  Procedure    Sedation: yes  Performed under: local infiltration  Guidance:ultrasound guided    ULTRASOUND INTERPRETATION.  Using ultrasound guidance a 21 G gauge needle was placed in close proximity to the nerve, at which point, under ultrasound guidance anesthetic was injected in the area of the nerve and spread of the anesthesia was seen on ultrasound in close proximity thereto.  There were no abnormalities seen on ultrasound; a digital image was taken; and the patient tolerated the procedure with no complications. Images:still images obtained, printed/placed on chart    Laterality:left  Block Type:ankle  Injection Technique:single-shot  Needle Type:echogenic  Needle Gauge:21 G  Resistance on Injection: less than 15 psi    Medications Used: bupivacaine (PF) (MARCAINE) 0.5 % injection - Injection   25 mL - 8/26/2024 8:25:00 AM      Medications  Comment:US was used for anatomical localization, directing the needle and visualization of LA spread.    Post Assessment  Injection Assessment: negative aspiration for heme, no paresthesia on injection and incremental injection  Patient Tolerance:comfortable throughout block  Complications:no  Performed by: Zachary Bowens MD

## 2024-08-26 NOTE — DISCHARGE INSTRUCTIONS
[facility]  [place], [place], Brooks Kramer,Dvaid, Zain  [address]  [place] [id number] [phone]    Discharge Instructions for Angiogram    Go home, rest and take it easy today.      You may experience some dizziness or memory loss from the anesthesia.  This may last for the next 24 hours.  Someone should plan on staying with you for the first 24 hours for your safety.    Do not make any important legal decisions or sign any legal papers for the next 24 hours.      Eat and drink lightly today.  Start off with liquids, jello, soup, crackers or other bland foods at first. You may advance your diet tomorrow as tolerated as long as you do not experience any nausea or vomiting.    You may resume routine medications including blood thinners. However, Glucophage should be not be started for 72 hours after the dye is given.      No lifting over 10 pounds and no strenuous activity for the next 2-3 days.    Try not to strain or bear down when your bowels move or when you empty your bladder.    Apply pressure to site when coughing, sneezing and changing positions.    Limit going up and down steps for 2 days.    No driving for the remainder of the day.  You may resume limited driving tomorrow if necessary.      You may shower tomorrow.  No bath or hot tubs for at least 2 days after the procedure.          Leave the pressure dressing on until tomorrow morning.  You may then take this off, as well as the small see through dressing with gauze tomorrow.  If it doesn’t come off easily, do not pull this off.  If it is stuck, shower and let the warm water loosen it before removal.       Check your groin dressing regularly for bleeding through the dressing (under the pressure dressing).  A small amount of blood contained by the gauze is normal; the whole dressing should not be filled with blood or leaking out under the sides.     If you experience bleeding through the gauze/clear  sticky dressing, lay flat and have someone apply direct pressure for 15 minutes.  If bleeding has stopped after this, you may put a clean gauze and tape over the area.  Continue to lie flat for 1-2 hours.  If bleeding continues after 15 minutes of pressure, call us at the number listed above.  There is an MD available after hours.      If you experience heavy bleeding or large swelling, continue to hold firm pressure and              call 911.  Do not call the MD on call.      If you experience pain or discomfort you may take Tylenol or Ibuprofen, whichever you normally use for minor discomfort, unless otherwise instructed.        If the MD gives you a prescription for narcotic pain pills (Tylenol #3, Vicodin, Hydrocodone, Oxycodone or Percocet), you cannot drive a vehicle or operate machinery while taking these.     Severe pain is not expected after this procedure.  If you experience severe pain, please call our office at [phone].     Remember to contact our office for any of the following:    Fever > 101 degrees  Severe pain that cannot be controlled by taking your pain pills  Severe nausea or vomiting   Significant bleeding of your incisions  Drainage that has a bad smell or is yellow or green in appearance  Any other questions or concerns

## 2024-08-26 NOTE — OP NOTE
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  Aortoiliac and femoral angiogram.  Left lower extremity runoff arteriogram.  Left proximal superficial femoral artery stent placement (7 x 60)    Left second and third toe amputations with metatarsal head resections.    Surgeon:   Clifton Guy MD    Assistant:    Latasha Wallace RN, Provided critical assistance in exposure, retraction, and suction that overall decrease blood loss and operative time.    Anesthesia:   MAC with regional    Estimated Blood Loss:   25-50 cc    Findings:    Successful ultrasound-guided right common femoral artery access.    No preoperative angiographic imaging available.    Aortoiliac and femoral angiogram: Widely patent aortoiliac system without stenosis.    Left lower extremity runoff arteriogram widely patent common and deep femoral artery.  There is a very irregular septum of likely chronic atherosclerotic plaque in the proximal superficial femoral artery measuring about 4 cm long.  Other than that the SFA and popliteal are widely patent.  Runoff vessel was through a peroneal artery with significant collaterals down onto the foot.  Nonvisualization of the anterior or posterior tibial artery.  Chronic dissection was treated with 7 x 60 self-expanding stent placement with 6 mm angioplasty with complete resolution.    Left second and third toe amputation done with metatarsal head resection.  Adequate blood supply.  No ascending infection onto the foot.    Implants:    Implant Name Type Inv. Item Serial No.  Lot No. LRB No. Used Action   STNT PROTEGE EVERFLX SEXP.035 [id number] 120 - [id number] Stent  STNT PROTEGE EVERFLX SEXP.035 7X60 120  EV3  [employer] [id number] Left 1 Implanted     Specimen:   none    Complications:   none    Access:  Right common femoral 6 Austrian sheath, destination.    Closure:  Perclose closure device    Dispo:  To PACU    Indication for procedure:   69 y.o. female with recent history of left leg thrombectomy from a chronically embolizing left iliac lesion.  She has demarcated some necrosis on her left third toe and is open septic arthritis of her left second toe and the proximal interphalangeal joint.  She reports the OR today for diagnostic angiogram with second and third toe amputation.    Description of procedure:   The patient was taken to the operating room and anesthesia induced without difficulty. Surgical sites were prepped and draped in the usual sterile manner. A full surgical timeout was performed. Ultrasound-guided right common femoral artery percutaneous access performed. Micro wire was placed without difficulty. Placement confirmed with fluoroscopy. Ultimately upsizing to the Micro sheath. Oblique angiogram of the access showed good common femoral access for closure device. A 6 Austrian sheath was placed. Flush catheter placed up in the abdominal aorta in the pararenal segment and AIF angiogram performed. Up-and-over wire catheterization was performed and a left leg angiogram performed. Then 7500 units of heparin was administered. A 6 Austrian Destination sheath was placed up and over the aortic bifurcation. The irregular dissection seen in the proximal SFA was primarily stented with 7 x 60 ED3 Protege stent. Post-angioplasty with a 6 mm balloon with complete resolution. Strong flow through the peroneal artery. The peroneal artery is the only runoff vessel to the foot but is well collateralized at the ankle. At this point ProGlide was used for hemostasis. One of the sutures on the ProGlide broke so we did hold pressure in the groin for 15 minutes after the protamine was  administered.     The left foot was prepped and draped sterilely. An elliptical incision was made at the base of the 2nd and 3rd toes. The proximal phalanxes were transected using rongeur and residual phalanx and metatarsal heads were resected. At this level of transection there appeared to be no ascending infection. Wounds were thoroughly irrigated with antibiotic-containing solution. Tendinous and nonvascular tissue was removed. Deep tissues closed with 3-0 Vicryl sutures and skin closed loosely for dermal approximation with Prolene in a vertical mattress manner interrupted. Sterile dressings were applied. The patient tolerated the procedure well.       At case completion all counts were correct x 2.    Clifton Guy MD  08/26/24    Active Hospital Problems    Diagnosis  POA    **Peripheral vascular disease, unspecified [I73.9]  Unknown      Resolved Hospital Problems   No resolved problems to display.

## 2024-08-26 NOTE — ANESTHESIA PREPROCEDURE EVALUATION
Anesthesia Evaluation     Patient summary reviewed   NPO Solid Status: > 8 hours  NPO Liquid Status: > 2 hours           Airway   Mallampati: II  TM distance: >3 FB  Neck ROM: full  No difficulty expected  Dental - normal exam     Pulmonary    (+) asthma,  Cardiovascular   Exercise tolerance: poor (<4 METS)    Rhythm: regular    (+) hypertension, PVD, hyperlipidemia      Neuro/Psych  (+) psychiatric history, dementia  GI/Hepatic/Renal/Endo    (+) obesity, GERD, renal disease- CRI    Musculoskeletal     Abdominal    Substance History      OB/GYN          Other                    Anesthesia Plan    ASA 3     MAC and regional       Anesthetic plan, risks, benefits, and alternatives have been provided, discussed and informed consent has been obtained with: patient.    CODE STATUS:

## 2024-08-26 NOTE — ANESTHESIA POSTPROCEDURE EVALUATION
"Patient: Erin BOND Tingle    Procedure Summary       Date: 08/26/24 Room / Location: [address] HYBRID OR    Anesthesia Start: 0857 Anesthesia Stop: 1021    Procedures:       Left leg angiogram with femoral angioplasty and stent (Left: Thigh)      Left second and third toe amputation. (Left) Diagnosis:       Peripheral vascular disease, unspecified      (Peripheral vascular disease, unspecified [I73.9])    Surgeons: Clifton Guy MD Provider: Zachary Bowens MD    Anesthesia Type: MAC, regional ASA Status: 3            Anesthesia Type: MAC, regional    Vitals  Vitals Value Taken Time   /68 08/26/24 1045   Temp 36.9 °C (98.5 °F) 08/26/24 1020   Pulse 82 08/26/24 1045   Resp 16 08/26/24 1045   SpO2 100 % 08/26/24 1045           Post Anesthesia Care and Evaluation    Patient location during evaluation: bedside  Patient participation: complete - patient participated  Level of consciousness: awake and alert  Pain management: adequate    Airway patency: patent  Anesthetic complications: No anesthetic complications  PONV Status: controlled  Cardiovascular status: acceptable and hemodynamically stable  Respiratory status: acceptable, spontaneous ventilation and nonlabored ventilation  Hydration status: acceptable    Comments: /89   Pulse 73   Temp 36.9 °C (98.5 °F) (Oral)   Resp 20   Ht 162 cm (63.78\")   Wt 84.2 kg (185 lb 10 oz)   SpO2 100%   BMI 32.08 kg/m²       "

## 2024-08-26 NOTE — H&P
[facility]   PREOPERATIVE HISTORY AND PHYSICAL    Patient Name:Erin Jones  : 1955  MRN: [id number]  Primary Care Physician: Laurie De La Rosa APRN  Date of admission: 2024    Subjective   Subjective     Chief Complaint: preoperative evaluation    History of Present Illness  rEin Jones is a 69 y.o. female who presents for preoperative evaluation. She is scheduled for Left leg angiogram with possible intervention.  Left second and third toe amputation. (Left), Left second and third toe amputation. (Left)    Review of Systems     Personal History     Past Medical History:   Diagnosis Date    Asthma 2022    Dementia     Foot ulcer     GERD (gastroesophageal reflux disease)     History of DVT of lower extremity 2024    LEFT LEG    History of pulmonary embolus (PE)     Hyperlipidemia 2015    Mixed anxiety and depressive disorder 2018    On anticoagulant therapy     PVD (peripheral vascular disease)     Stage 3 chronic kidney disease 2015    FOLLOWED BY PCP ONLY       Past Surgical History:   Procedure Laterality Date    COLONOSCOPY      FEMORAL THROMBECTOMY/EMBOLECTOMY Left 2024    Procedure: FEMORAL THROMBECTOMY/EMBOLECTOMY;  Surgeon: Clifton Guy MD;  Location: [facility];  Service: Vascular;  Laterality: Left;    HYSTERECTOMY  2016    LEG THROMBECTOMY/EMBOLECTOMY Left 2024       Family History: Her family history includes Alcohol abuse in her father; Cancer in her mother.     Social History: She  reports that she has never smoked. She has never used smokeless tobacco. She reports that she does not currently use alcohol. She reports that she does not use drugs.    Home Medications:  HYDROcodone-acetaminophen, albuterol sulfate HFA, apixaban, atorvastatin, budesonide-formoterol, cetirizine, donepezil, doxycycline, famotidine, fenofibrate, folic acid, memantine, sertraline, and vitamin B-6    Allergies:  She has No Known  Allergies.    Objective    Objective     Vitals:    Temp:  [99 °F (37.2 °C)] 99 °F (37.2 °C)  Heart Rate:  [64-72] 70  Resp:  [16] 16  BP: (137)/(72) 137/72    Physical Exam  Constitutional:       Appearance: She is well-developed.   Pulmonary:      Effort: Pulmonary effort is normal. No respiratory distress.   Abdominal:      General: There is no distension.      Palpations: Abdomen is soft.   Neurological:      Mental Status: She is alert and oriented to person, place, and time.         Assessment & Plan   Assessment / Plan     Brief Patient Summary:  Erin Jones is a 69 y.o. female who presents for preoperative evaluation.    Pre-Op Diagnosis Codes:     * Peripheral vascular disease, unspecified [I73.9]    Active Hospital Problems:  Active Hospital Problems    Diagnosis     **Peripheral vascular disease, unspecified      Plan:   Procedure(s):  Left leg angiogram with possible intervention.  Left second and third toe amputation.  Left second and third toe amputation.    Patient has been recommended for lower extremity endovascular revascularization.  Patient understands the inherent risks associated with lower extremity revascularization .  These include but not are not limited to stroke, heart attack, bleeding, infection, need for secondary surgery/intervention, access site complications, progression of arterial disease requiring amputation, and even death.  Patient also understands the nature of peripheral arterial disease and if a left untreated would put them at increased risk for worsening ischemia, infection, pain, and possibility for amputation.      Clifton Guy MD

## 2024-09-01 LAB
CYTO UR: NORMAL
LAB AP CASE REPORT: NORMAL
LAB AP DIAGNOSIS COMMENT: NORMAL
PATH REPORT.FINAL DX SPEC: NORMAL
PATH REPORT.GROSS SPEC: NORMAL

## 2024-09-03 ENCOUNTER — OFFICE VISIT (OUTPATIENT)
Age: 69
End: 2024-09-03
Payer: MEDICARE

## 2024-09-03 VITALS — BODY MASS INDEX: 31.58 KG/M2 | HEIGHT: 64 IN | WEIGHT: 185 LBS

## 2024-09-03 DIAGNOSIS — I73.9 PERIPHERAL VASCULAR DISEASE, UNSPECIFIED: Primary | ICD-10-CM

## 2024-09-03 DIAGNOSIS — M00.9 SEPTIC ARTHRITIS OF LEFT FOOT, DUE TO UNSPECIFIED ORGANISM: ICD-10-CM

## 2024-09-03 PROCEDURE — 99024 POSTOP FOLLOW-UP VISIT: CPT | Performed by: SURGERY

## 2024-09-03 NOTE — PROGRESS NOTES
"Chief Complaint  Peripheral Vascular Disease and Leg Swelling (Knot on leg after fall)    Subjective        Erin Jones presents to [facility] VASCULAR SURGERY  History of Present Illness    Doing well postop from 2 amputations and angiogram/stent placement.    Objective   Vital Signs:  Ht 162 cm (63.78\")   Wt 83.9 kg (185 lb)   BMI 31.97 kg/m²   Estimated body mass index is 31.97 kg/m² as calculated from the following:    Height as of this encounter: 162 cm (63.78\").    Weight as of this encounter: 83.9 kg (185 lb).           Erin Jones  reports that she has never smoked. She has never used smokeless tobacco.        Physical Exam  Constitutional:       Appearance: She is well-developed.   Pulmonary:      Effort: Pulmonary effort is normal. No respiratory distress.   Abdominal:      General: There is no distension.      Palpations: Abdomen is soft.   Neurological:      Mental Status: She is alert and oriented to person, place, and time.     Well-healed left posterior tibial and anterior tibial incisions.    Well-healed left femoral incision.     Multiphasic left peroneal artery signal.  Mono to biphasic posterior tibial and dorsalis pedis on the left foot.    Result Review :    The following data was reviewed by: Clifton Guy MD on 09/03/24  CBC          6/11/2024    13:54 6/12/2024    05:28 6/12/2024    11:02 8/20/2024    13:43   CBC   WBC 8.13  12.89  11.75  8.26    RBC 4.83  4.25  4.55  4.10    Hemoglobin 14.7  12.9  14.0  12.2    Hematocrit 42.4  37.1  40.4  38.0    MCV 87.8  87.3  88.8  92.7    MCH 30.4  30.4  30.8  29.8    MCHC 34.7  34.8  34.7  32.1    RDW 13.1  13.0  13.2  13.2    Platelets 246  249  202  275       BMP          6/13/2024    10:58 6/14/2024    05:10 6/14/2024    19:06 8/20/2024    13:43   BMP   BUN 10  7   15    Creatinine 1.22  1.03   1.53    Sodium 137  139   139    Potassium 3.5  3.6  4.5  3.7    Chloride 106  107   104    CO2 21.6  21.7   24.1    Calcium " 8.5  8.1   9.5       A1C Last 3 Results          9/12/2023    15:52 10/25/2023    15:03 6/12/2024    05:28   HGBA1C Last 3 Results   Hemoglobin A1C 5.5  5.3  5.40          Vascular Surgical History:  6/11/2024: Left femoral embolectomy with left anterior and posterior tibial embolectomies.  Op Note by Clifton Guy MD (06/11/2024 17:36)   8/26/2024: Left leg SFA stent placement with left second and third toe amputation metatarsal head resections  Op Note by Clifton Guy MD (08/26/2024 09:18)       Vascular Imaging History:  CT angiogram with runoff:  6/11/2024:  1. Complete occlusion of the mid left popliteal artery with minimal  short segment reconstitution of the posterior tibialis and peroneal  arteries at the mid to distal calf and minimal contrast enhancement of  the medial and lateral plantar arteries at the level of the midfoot and  forefoot.  2. Nonocclusive thrombus in the left common iliac artery.  3. Calcified atherosclerotic disease at the origin of the right renal  artery resulting in less than 50% stenosis.  4. Three-vessel runoff to the right ankle.            Assessment and Plan     Vascular surgery following for:  Peripheral vascular disease with embolizing lesion off the left common iliac    7/9/2024:      8/20/2024:      Diagnoses and all orders for this visit:    1. Peripheral vascular disease, unspecified (Primary)    2. Septic arthritis of left foot, due to unspecified organism      Patient doing quite well after her left SFA stent placement and second third toe amputations.  Wound is healing up nicely without any breakdown.  Will leave the sutures in for an additional 3 weeks.  She will continue aspirin and Eliquis in addition to her Lipitor.     Vascular medical management:Patient should continue Eliquis 5 mg p.o. twice daily, aspirin 81 mg daily, and Lipitor 10 mg daily.     Follow Up     No follow-ups on file.  Patient was given instructions and counseling regarding her condition  or for health maintenance advice. Please see specific information pulled into the AVS if appropriate.

## 2024-09-20 ENCOUNTER — TELEPHONE (OUTPATIENT)
Age: 69
End: 2024-09-20
Payer: MEDICARE

## 2024-09-24 ENCOUNTER — OFFICE VISIT (OUTPATIENT)
Age: 69
End: 2024-09-24
Payer: MEDICARE

## 2024-09-24 VITALS
SYSTOLIC BLOOD PRESSURE: 140 MMHG | HEIGHT: 64 IN | BODY MASS INDEX: 31.58 KG/M2 | WEIGHT: 185 LBS | DIASTOLIC BLOOD PRESSURE: 70 MMHG

## 2024-09-24 DIAGNOSIS — M00.9 SEPTIC ARTHRITIS OF LEFT FOOT, DUE TO UNSPECIFIED ORGANISM: ICD-10-CM

## 2024-09-24 DIAGNOSIS — I73.9 PERIPHERAL VASCULAR DISEASE, UNSPECIFIED: Primary | ICD-10-CM

## 2024-10-16 RX ORDER — APIXABAN 5 MG/1
5 TABLET, FILM COATED ORAL EVERY 12 HOURS
Qty: 60 TABLET | Refills: 0 | Status: SHIPPED | OUTPATIENT
Start: 2024-10-16

## 2024-11-19 ENCOUNTER — TELEPHONE (OUTPATIENT)
Dept: NEUROLOGY | Facility: CLINIC | Age: 69
End: 2024-11-19

## 2024-11-19 NOTE — TELEPHONE ENCOUNTER
Caller: MIRELLA REYES    Relationship:  Emergency Contact    Best call back number: [phone]    PATIENT CALLED REQUESTING TO CANCEL SAME DAY APPT.    Did the patient call AFTER the start time of their scheduled appointment?  []YES  [x]NO    Was the patient's appointment rescheduled? [x]YES  []NO    Any additional information:   PT IS SICK

## 2024-12-02 RX ORDER — APIXABAN 5 MG/1
5 TABLET, FILM COATED ORAL EVERY 12 HOURS
Qty: 60 TABLET | Refills: 5 | Status: SHIPPED | OUTPATIENT
Start: 2024-12-02

## 2024-12-10 ENCOUNTER — OFFICE VISIT (OUTPATIENT)
Age: 69
End: 2024-12-10
Payer: MEDICARE

## 2024-12-10 VITALS
HEIGHT: 64 IN | SYSTOLIC BLOOD PRESSURE: 138 MMHG | WEIGHT: 185 LBS | HEART RATE: 57 BPM | DIASTOLIC BLOOD PRESSURE: 82 MMHG | BODY MASS INDEX: 31.58 KG/M2

## 2024-12-10 DIAGNOSIS — I73.9 PERIPHERAL VASCULAR DISEASE, UNSPECIFIED: Primary | ICD-10-CM

## 2024-12-10 NOTE — PROGRESS NOTES
"Chief Complaint  Extremity Pain (Foot burning)    Subjective        Erin Jones presents to [facility] VASCULAR SURGERY  History of Present Illness    Patient comes in today for evaluation for some numbness tingling and burning on the dorsal aspect of her foot.    Objective   Vital Signs:  /82   Pulse 57   Ht 162 cm (63.78\")   Wt 83.9 kg (185 lb)   BMI 31.97 kg/m²   Estimated body mass index is 31.97 kg/m² as calculated from the following:    Height as of this encounter: 162 cm (63.78\").    Weight as of this encounter: 83.9 kg (185 lb).           Erin Jones  reports that she has never smoked. She has never used smokeless tobacco.        Physical Exam  Constitutional:       Appearance: She is well-developed.   Pulmonary:      Effort: Pulmonary effort is normal. No respiratory distress.   Abdominal:      General: There is no distension.      Palpations: Abdomen is soft.   Neurological:      Mental Status: She is alert and oriented to person, place, and time.     Well-healed left posterior tibial and anterior tibial incisions.    Well-healed left femoral incision.     Multiphasic left peroneal artery signal.  Mono to biphasic posterior tibial and dorsalis pedis on the left foot.    Well-healed left second and third toe amputations.    Result Review :    The following data was reviewed by: Clifton Guy MD on 12/10/24  CBC          6/11/2024    13:54 6/12/2024    05:28 6/12/2024    11:02 8/20/2024    13:43   CBC   WBC 8.13  12.89  11.75  8.26    RBC 4.83  4.25  4.55  4.10    Hemoglobin 14.7  12.9  14.0  12.2    Hematocrit 42.4  37.1  40.4  38.0    MCV 87.8  87.3  88.8  92.7    MCH 30.4  30.4  30.8  29.8    MCHC 34.7  34.8  34.7  32.1    RDW 13.1  13.0  13.2  13.2    Platelets 246  249  202  275       BMP          6/13/2024    10:58 6/14/2024    05:10 6/14/2024    19:06 8/20/2024    13:43   BMP   BUN 10  7   15    Creatinine 1.22  1.03   1.53    Sodium 137  139   139    Potassium " 3.5  3.6  4.5  3.7    Chloride 106  107   104    CO2 21.6  21.7   24.1    Calcium 8.5  8.1   9.5       A1C Last 3 Results          6/12/2024    05:28   HGBA1C Last 3 Results   Hemoglobin A1C 5.40          Vascular Surgical History:  6/11/2024: Left femoral embolectomy with left anterior and posterior tibial embolectomies.  Op Note by Clifton Guy MD (06/11/2024 17:36)   8/26/2024: Left leg SFA stent placement with left second and third toe amputation metatarsal head resections  Op Note by Clifton Guy MD (08/26/2024 09:18)       Vascular Imaging History:  CT angiogram with runoff:  6/11/2024:  1. Complete occlusion of the mid left popliteal artery with minimal  short segment reconstitution of the posterior tibialis and peroneal  arteries at the mid to distal calf and minimal contrast enhancement of  the medial and lateral plantar arteries at the level of the midfoot and  forefoot.  2. Nonocclusive thrombus in the left common iliac artery.  3. Calcified atherosclerotic disease at the origin of the right renal  artery resulting in less than 50% stenosis.  4. Three-vessel runoff to the right ankle.            Assessment and Plan     Vascular surgery following for:  Peripheral vascular disease with embolizing lesion off the left common iliac    7/9/2024:      8/20/2024:      Diagnoses and all orders for this visit:    1. Peripheral vascular disease, unspecified (Primary)      Patient comes in today with family for evaluation for some burning numbness and tingling on the dorsal aspect of her foot.  Her amputation sites are well-healed without any breakdown or concerns for infection both deep and superficial.  She has multiphasic, normal, peroneal artery signal.  Grossly the foot is well-perfused.  I suspect she has some neuropathy caused by the time that which her leg and foot were ischemic.  They have tried gabapentin in the past but had to stop it because it made her dementia worse.  Would continue with  nonnarcotic based pain medicines as needed.  She will see me back in late January for her femoral duplex follow-up.     Vascular medical management:Patient should continue Eliquis 5 mg p.o. twice daily, aspirin 81 mg daily, and Lipitor 10 mg daily.       Follow Up     Return in about 6 weeks (around 1/21/2025) for Follow up with vascular ultrasound.  Patient was given instructions and counseling regarding her condition or for health maintenance advice. Please see specific information pulled into the AVS if appropriate.

## 2024-12-24 RX ORDER — ATORVASTATIN CALCIUM 10 MG/1
10 TABLET, FILM COATED ORAL DAILY
Qty: 30 TABLET | Refills: 0 | Status: SHIPPED | OUTPATIENT
Start: 2024-12-24

## 2024-12-24 RX ORDER — FENOFIBRATE 160 MG/1
160 TABLET ORAL DAILY
Qty: 30 TABLET | Refills: 0 | Status: SHIPPED | OUTPATIENT
Start: 2024-12-24

## 2024-12-24 RX ORDER — MULTIVITAMIN WITH IRON
100 TABLET ORAL DAILY
Qty: 30 TABLET | Refills: 0 | Status: SHIPPED | OUTPATIENT
Start: 2024-12-24

## 2025-01-21 ENCOUNTER — OFFICE VISIT (OUTPATIENT)
Age: 70
End: 2025-01-21
Payer: MEDICARE

## 2025-01-21 ENCOUNTER — HOSPITAL ENCOUNTER (OUTPATIENT)
Facility: HOSPITAL | Age: 70
Discharge: HOME | End: 2025-01-21
Payer: MEDICARE

## 2025-01-21 VITALS
BODY MASS INDEX: 31.58 KG/M2 | DIASTOLIC BLOOD PRESSURE: 82 MMHG | SYSTOLIC BLOOD PRESSURE: 130 MMHG | HEIGHT: 64 IN | WEIGHT: 185 LBS

## 2025-01-21 DIAGNOSIS — I73.9 PERIPHERAL VASCULAR DISEASE, UNSPECIFIED: ICD-10-CM

## 2025-01-21 DIAGNOSIS — I73.9 PERIPHERAL VASCULAR DISEASE, UNSPECIFIED: Primary | ICD-10-CM

## 2025-01-21 LAB
BH CV LEA LEFT ANT TIBIAL A DISTAL EDV: 23.5 CM/S
BH CV LEA LEFT ANT TIBIAL A DISTAL PSV: 101.1 CM/S
BH CV LEA LEFT ANT TIBIAL A MID PSV: 81.4 CM/S
BH CV LEA LEFT ANT TIBIAL A PROX EDV: 5.88 CM/S
BH CV LEA LEFT ANT TIBIAL A PROX PSV: 41.5 CM/S
BH CV LEA LEFT CFA DISTAL EDV: 17.6 CM/S
BH CV LEA LEFT CFA DISTAL PSV: 137 CM/S
BH CV LEA LEFT CFA PROX EDV: 19.8 CM/S
BH CV LEA LEFT CFA PROX PSV: 187 CM/S
BH CV LEA LEFT DFA PROX EDV: 13.3 CM/S
BH CV LEA LEFT DFA PROX PSV: 97.2 CM/S
BH CV LEA LEFT PERONEAL  DISTAL EDV: -18.2 CM/S
BH CV LEA LEFT PERONEAL  DISTAL PSV: -123 CM/S
BH CV LEA LEFT PERONEAL  MID EDV: 18.9 CM/S
BH CV LEA LEFT PERONEAL  MID PSV: 99.5 CM/S
BH CV LEA LEFT PERONEAL  PROX EDV: 21 CM/S
BH CV LEA LEFT PERONEAL  PROX PSV: 101.6 CM/S
BH CV LEA LEFT POPITEAL A  DISTAL EDV: -14.7 CM/S
BH CV LEA LEFT POPITEAL A  DISTAL PSV: -100.9 CM/S
BH CV LEA LEFT POPITEAL A  MID EDV: 13.7 CM/S
BH CV LEA LEFT POPITEAL A  MID PSV: 92.2 CM/S
BH CV LEA LEFT POPITEAL A  PROX EDV: 14 CM/S
BH CV LEA LEFT POPITEAL A  PROX PSV: 103.7 CM/S
BH CV LEA LEFT PTA DISTAL PSV: -25.1 CM/S
BH CV LEA LEFT PTA MID EDV: 14.7 CM/S
BH CV LEA LEFT PTA MID PSV: 102.3 CM/S
BH CV LEA LEFT PTA PROX EDV: -13.7 CM/S
BH CV LEA LEFT PTA PROX PSV: -94.4 CM/S
BH CV LEA LEFT SFA DISTAL EDV: -12.6 CM/S
BH CV LEA LEFT SFA DISTAL PSV: -103 CM/S
BH CV LEA LEFT SFA MID EDV: -12.5 CM/S
BH CV LEA LEFT SFA MID PSV: -107.4 CM/S
BH CV LEA LEFT SFA PROX EDV: 21.3 CM/S
BH CV LEA LEFT SFA PROX PSV: 179.2 CM/S
BH CV LEA LEFT TIBEOPERONEAL EDV: 11.9 CM/S
BH CV LEA LEFT TIBEOPERONEAL PSV: 121 CM/S
BH CV LOWER ARTERIAL LEFT ABI RATIO: 0.9
BH CV LOWER ARTERIAL LEFT DORSALIS PEDIS SYS MAX: 90
BH CV LOWER ARTERIAL LEFT POST TIBIAL SYS MAX: 124
BH CV LOWER ARTERIAL RIGHT ABI RATIO: 1.2
BH CV LOWER ARTERIAL RIGHT DORSALIS PEDIS SYS MAX: 160
BH CV LOWER ARTERIAL RIGHT POST TIBIAL SYS MAX: 166
BH CV VAS LEA LEFT FREE TEXT STENT ONE: NORMAL
BH CV VAS LEA LEFT STENT ONE DIST STENT EDV: 11 CM/S
BH CV VAS LEA LEFT STENT ONE DIST STENT PSV: 108 CM/S
BH CV VAS LEA LEFT STENT ONE MID STENT EDV: 11.8 CM/S
BH CV VAS LEA LEFT STENT ONE MID STENT PSV: 117 CM/S
BH CV VAS LEA LEFT STENT ONE POST STENT EDV: 13.9 CM/S
BH CV VAS LEA LEFT STENT ONE POST STENT PSV: 130 CM/S
BH CV VAS LEA LEFT STENT ONE PRE STENT EDV: 17.4 CM/S
BH CV VAS LEA LEFT STENT ONE PRE STENT PSV: 157 CM/S
BH CV VAS LEA LEFT STENT ONE PROX STENT EDV: 21.3 CM/S
BH CV VAS LEA LEFT STENT ONE PROX STENT PSV: 179 CM/S
UPPER ARTERIAL LEFT ARM BRACHIAL SYS MAX: NORMAL
UPPER ARTERIAL RIGHT ARM BRACHIAL SYS MAX: NORMAL

## 2025-01-21 PROCEDURE — 3075F SYST BP GE 130 - 139MM HG: CPT | Performed by: SURGERY

## 2025-01-21 PROCEDURE — 93926 LOWER EXTREMITY STUDY: CPT

## 2025-01-21 PROCEDURE — 3079F DIAST BP 80-89 MM HG: CPT | Performed by: SURGERY

## 2025-01-21 PROCEDURE — 1160F RVW MEDS BY RX/DR IN RCRD: CPT | Performed by: SURGERY

## 2025-01-21 PROCEDURE — 93922 UPR/L XTREMITY ART 2 LEVELS: CPT | Performed by: SURGERY

## 2025-01-21 PROCEDURE — 1159F MED LIST DOCD IN RCRD: CPT | Performed by: SURGERY

## 2025-01-21 PROCEDURE — 93922 UPR/L XTREMITY ART 2 LEVELS: CPT

## 2025-01-21 PROCEDURE — 99214 OFFICE O/P EST MOD 30 MIN: CPT | Performed by: SURGERY

## 2025-01-21 PROCEDURE — 93926 LOWER EXTREMITY STUDY: CPT | Performed by: SURGERY

## 2025-01-21 NOTE — PROGRESS NOTES
"Chief Complaint  Peripheral Vascular Disease    Subjective        Erin Jones presents to [facility] VASCULAR SURGERY  History of Present Illness    Patient overall doing well.  Her last 6 months she is no longer complaining of any pain in her left foot.    Objective   Vital Signs:  /82   Ht 162 cm (63.78\")   Wt 83.9 kg (185 lb)   BMI 31.97 kg/m²   Estimated body mass index is 31.97 kg/m² as calculated from the following:    Height as of this encounter: 162 cm (63.78\").    Weight as of this encounter: 83.9 kg (185 lb).           Erin Jones  reports that she has never smoked. She has never used smokeless tobacco.        Physical Exam  Constitutional:       Appearance: She is well-developed.   Pulmonary:      Effort: Pulmonary effort is normal. No respiratory distress.   Abdominal:      General: There is no distension.      Palpations: Abdomen is soft.   Neurological:      Mental Status: She is alert and oriented to person, place, and time.     Well-healed left posterior tibial and anterior tibial incisions.    Well-healed left femoral incision.     Multiphasic left peroneal artery signal.  Mono to biphasic posterior tibial and dorsalis pedis on the left foot.    Well-healed left second and third toe amputations.    Result Review :    The following data was reviewed by: Clifton Guy MD on 01/21/25  CBC          6/11/2024    13:54 6/12/2024    05:28 6/12/2024    11:02 8/20/2024    13:43   CBC   WBC 8.13  12.89  11.75  8.26    RBC 4.83  4.25  4.55  4.10    Hemoglobin 14.7  12.9  14.0  12.2    Hematocrit 42.4  37.1  40.4  38.0    MCV 87.8  87.3  88.8  92.7    MCH 30.4  30.4  30.8  29.8    MCHC 34.7  34.8  34.7  32.1    RDW 13.1  13.0  13.2  13.2    Platelets 246  249  202  275       BMP          6/13/2024    10:58 6/14/2024    05:10 6/14/2024    19:06 8/20/2024    13:43   BMP   BUN 10  7   15    Creatinine 1.22  1.03   1.53    Sodium 137  139   139    Potassium 3.5  3.6  4.5  3.7  "   Chloride 106  107   104    CO2 21.6  21.7   24.1    Calcium 8.5  8.1   9.5       A1C Last 3 Results          6/12/2024    05:28   HGBA1C Last 3 Results   Hemoglobin A1C 5.40          Vascular Surgical History:  6/11/2024: Left femoral embolectomy with left anterior and posterior tibial embolectomies.  Op Note by Clifton Guy MD (06/11/2024 17:36)   8/26/2024: Left leg SFA stent placement with left second and third toe amputation metatarsal head resections  Op Note by Clifton Guy MD (08/26/2024 09:18)       Vascular Imaging History:  ABIs:  1/21/2025: Right 1.2 left 0.9.    Left leg arterial duplex:  1/21/2025:  Patent left sided superficial femoral artery stent without stenosis identified.  Highest velocity associated with the stent is just distal to the stent measuring 179 cm/s.         CT angiogram with runoff:  6/11/2024:  1. Complete occlusion of the mid left popliteal artery with minimal  short segment reconstitution of the posterior tibialis and peroneal  arteries at the mid to distal calf and minimal contrast enhancement of  the medial and lateral plantar arteries at the level of the midfoot and  forefoot.  2. Nonocclusive thrombus in the left common iliac artery.  3. Calcified atherosclerotic disease at the origin of the right renal  artery resulting in less than 50% stenosis.  4. Three-vessel runoff to the right ankle.            Assessment and Plan     Vascular surgery following for:  Peripheral vascular disease with embolizing lesion off the left common iliac    7/9/2024:      8/20/2024:      Diagnoses and all orders for this visit:    1. Peripheral vascular disease, unspecified (Primary)  -     Doppler Ankle Brachial Index Single Level CAR; Future  -     Duplex Lower Extremity Art / Grafts - Left CAR; Future        Patient overall doing quite well.  Stent duplex and ABIs all appear reasonably appropriate.  She has known severe distal left foot disease.  Her left toe amputations remain  well-healed.  She is on good medical therapy.  Will see her back in 6 months time for repeat duplexes and DANIEL.  All questions answered.  She is to call the office sooner if problems arise.     Vascular medical management:Patient should continue Eliquis 5 mg p.o. twice daily, aspirin 81 mg daily, and Lipitor 10 mg daily.       Follow Up     Return in about 6 months (around 7/21/2025), or if symptoms worsen or fail to improve, for Follow up with vascular ultrasound.  Patient was given instructions and counseling regarding her condition or for health maintenance advice. Please see specific information pulled into the AVS if appropriate.

## 2025-01-30 RX ORDER — FAMOTIDINE 40 MG/1
40 TABLET, FILM COATED ORAL DAILY
Qty: 30 TABLET | Refills: 0 | Status: SHIPPED | OUTPATIENT
Start: 2025-01-30

## 2025-01-30 RX ORDER — ATORVASTATIN CALCIUM 10 MG/1
10 TABLET, FILM COATED ORAL DAILY
Qty: 30 TABLET | Refills: 0 | Status: SHIPPED | OUTPATIENT
Start: 2025-01-30

## 2025-01-30 RX ORDER — FENOFIBRATE 160 MG/1
160 TABLET ORAL DAILY
Qty: 30 TABLET | Refills: 0 | Status: SHIPPED | OUTPATIENT
Start: 2025-01-30

## 2025-01-30 RX ORDER — MULTIVITAMIN WITH IRON
100 TABLET ORAL DAILY
Qty: 30 TABLET | Refills: 0 | Status: SHIPPED | OUTPATIENT
Start: 2025-01-30

## 2025-02-25 RX ORDER — DONEPEZIL HYDROCHLORIDE 10 MG/1
10 TABLET, FILM COATED ORAL
Qty: 90 TABLET | Refills: 0 | Status: SHIPPED | OUTPATIENT
Start: 2025-02-25

## 2025-02-28 ENCOUNTER — DOCUMENTATION (OUTPATIENT)
Dept: FAMILY MEDICINE CLINIC | Facility: CLINIC | Age: 70
End: 2025-02-28
Payer: MEDICARE

## 2025-02-28 RX ORDER — FOLIC ACID 1 MG/1
1000 TABLET ORAL DAILY
Qty: 30 TABLET | Refills: 5 | Status: SHIPPED | OUTPATIENT
Start: 2025-02-28

## 2025-02-28 RX ORDER — FENOFIBRATE 160 MG/1
160 TABLET ORAL DAILY
Qty: 30 TABLET | Refills: 5 | Status: SHIPPED | OUTPATIENT
Start: 2025-02-28

## 2025-02-28 RX ORDER — MULTIVITAMIN WITH IRON
100 TABLET ORAL DAILY
Qty: 30 TABLET | Refills: 5 | Status: SHIPPED | OUTPATIENT
Start: 2025-02-28

## 2025-02-28 RX ORDER — FAMOTIDINE 40 MG/1
40 TABLET, FILM COATED ORAL DAILY
Qty: 30 TABLET | Refills: 5 | Status: SHIPPED | OUTPATIENT
Start: 2025-02-28

## 2025-02-28 RX ORDER — ATORVASTATIN CALCIUM 10 MG/1
10 TABLET, FILM COATED ORAL DAILY
Qty: 30 TABLET | Refills: 5 | Status: SHIPPED | OUTPATIENT
Start: 2025-02-28

## 2025-04-28 RX ORDER — CETIRIZINE HYDROCHLORIDE 10 MG/1
10 TABLET ORAL DAILY
Qty: 30 TABLET | Refills: 5 | Status: SHIPPED | OUTPATIENT
Start: 2025-04-28

## 2025-05-20 ENCOUNTER — PATIENT MESSAGE (OUTPATIENT)
Dept: NEUROLOGY | Facility: CLINIC | Age: 70
End: 2025-05-20
Payer: MEDICARE

## 2025-05-30 RX ORDER — DONEPEZIL HYDROCHLORIDE 10 MG/1
10 TABLET, FILM COATED ORAL
Qty: 90 TABLET | Refills: 1 | Status: SHIPPED | OUTPATIENT
Start: 2025-05-30

## 2025-06-03 RX ORDER — APIXABAN 5 MG/1
5 TABLET, FILM COATED ORAL
Qty: 60 TABLET | Refills: 5 | Status: SHIPPED | OUTPATIENT
Start: 2025-06-03

## 2025-06-24 RX ORDER — MEMANTINE HYDROCHLORIDE 10 MG/1
10 TABLET ORAL 2 TIMES DAILY
Qty: 180 TABLET | Refills: 1 | OUTPATIENT
Start: 2025-06-24

## 2025-06-24 NOTE — TELEPHONE ENCOUNTER
Patient's refills for Namenda were denied. She needs to be seen in the office to discuss further refills.

## 2025-06-26 RX ORDER — MEMANTINE HYDROCHLORIDE 10 MG/1
10 TABLET ORAL 2 TIMES DAILY
Qty: 180 TABLET | Refills: 1 | OUTPATIENT
Start: 2025-06-26

## 2025-07-25 RX ORDER — MEMANTINE HYDROCHLORIDE 10 MG/1
10 TABLET ORAL 2 TIMES DAILY
Qty: 180 TABLET | Refills: 1 | OUTPATIENT
Start: 2025-07-25

## 2025-07-28 RX ORDER — MEMANTINE HYDROCHLORIDE 10 MG/1
10 TABLET ORAL 2 TIMES DAILY
Qty: 180 TABLET | Refills: 1 | OUTPATIENT
Start: 2025-07-28

## 2025-07-29 ENCOUNTER — HOSPITAL ENCOUNTER (OUTPATIENT)
Facility: HOSPITAL | Age: 70
Discharge: HOME | End: 2025-07-29
Payer: MEDICARE

## 2025-07-29 ENCOUNTER — OFFICE VISIT (OUTPATIENT)
Age: 70
End: 2025-07-29
Payer: MEDICARE

## 2025-07-29 VITALS
DIASTOLIC BLOOD PRESSURE: 86 MMHG | WEIGHT: 180 LBS | BODY MASS INDEX: 30.73 KG/M2 | SYSTOLIC BLOOD PRESSURE: 126 MMHG | HEIGHT: 64 IN

## 2025-07-29 DIAGNOSIS — Z98.890 PERIPHERAL ARTERIAL DISEASE WITH HISTORY OF REVASCULARIZATION: Primary | ICD-10-CM

## 2025-07-29 DIAGNOSIS — I73.9 PERIPHERAL VASCULAR DISEASE, UNSPECIFIED: ICD-10-CM

## 2025-07-29 DIAGNOSIS — I73.9 PERIPHERAL ARTERIAL DISEASE WITH HISTORY OF REVASCULARIZATION: Primary | ICD-10-CM

## 2025-07-29 PROCEDURE — 1160F RVW MEDS BY RX/DR IN RCRD: CPT | Performed by: NURSE PRACTITIONER

## 2025-07-29 PROCEDURE — 3079F DIAST BP 80-89 MM HG: CPT | Performed by: NURSE PRACTITIONER

## 2025-07-29 PROCEDURE — 99213 OFFICE O/P EST LOW 20 MIN: CPT | Performed by: NURSE PRACTITIONER

## 2025-07-29 PROCEDURE — 3074F SYST BP LT 130 MM HG: CPT | Performed by: NURSE PRACTITIONER

## 2025-07-29 PROCEDURE — 1159F MED LIST DOCD IN RCRD: CPT | Performed by: NURSE PRACTITIONER

## 2025-07-29 PROCEDURE — 93926 LOWER EXTREMITY STUDY: CPT

## 2025-07-29 PROCEDURE — 93922 UPR/L XTREMITY ART 2 LEVELS: CPT

## 2025-07-29 NOTE — PROGRESS NOTES
Chief Complaint  Peripheral Vascular Disease    Subjective        Erin Jones presents to [facility] VASCULAR SURGERY  HPI   Erin Jones is a 69 y.o. female that has been followed in our office for peripheral arterial disease.  In 2024, she presented to the hospital with acute left lower extremity ischemia.  She underwent a left common iliac, external iliac, superficial femoral, and popliteal artery, anterior tibial, and posterior tibial embolectomy.  She also had a superficial femoral artery stent placed.  She then had a left 2nd and 3rd toe amputation with metatarsal head resection.  She returns today in follow up along with ABIs left lower extremity duplex. She reports she  has been doing well without hospitalizations or surgeries. She denies any worsening claudication symptoms, rest pain, or tissue loss.     Review of Systems   Constitutional:  Negative for fever.   Eyes:  Negative for visual disturbance.   Cardiovascular:  Negative for leg swelling.   Gastrointestinal:  Negative for abdominal pain.   Musculoskeletal:  Negative for back pain.   Skin:  Negative for color change, pallor and wound.   Neurological:  Negative for dizziness, facial asymmetry, speech difficulty and weakness.        Erin Jones  reports that she has never smoked. She has been exposed to tobacco smoke. She has never used smokeless tobacco..        Objective   Vital Signs:  Vitals:    07/29/25 1348   BP: 126/86      Body mass index is 31.11 kg/m².   BMI is >= 30 and <35. (Class 1 Obesity). The following options were offered after discussion;: information on healthy weight added to patient's after visit summary        Physical Exam  Vitals reviewed.   Constitutional:       Appearance: Normal appearance.   HENT:      Head: Normocephalic.   Cardiovascular:      Rate and Rhythm: Normal rate and regular rhythm.      Pulses:           Dorsalis pedis pulses are 3+ on the right side and 3+ on the left side.         Posterior tibial pulses are 3+ on the right side and 3+ on the left side.   Pulmonary:      Effort: Pulmonary effort is normal.   Skin:     General: Skin is warm.   Neurological:      General: No focal deficit present.      Mental Status: She is alert and oriented to person, place, and time.   Psychiatric:         Mood and Affect: Mood normal.          Result Review :    ABIs from last year:Doppler Ankle Brachial Index Single Level CAR (01/21/2025 15:15)   Duplex Lower Extremity Art / Grafts - Left CAR (01/21/2025 14:56)     ABIs from today: Doppler Ankle Brachial Index Single Level CAR (07/29/2025 13:32)   Duplex Lower Extremity Art / Grafts - Left CAR (07/29/2025 13:19)                    Assessment and Plan     Diagnoses and all orders for this visit:    1. Peripheral arterial disease with history of revascularization (Primary)  -     Doppler Ankle Brachial Index Single Level CAR; Future  -     Duplex Lower Extremity Art / Grafts - Left CAR; Future          Patient presents today for follow up of her peripheral arterial disease.  Today, her DANIEL in the right is greater than 1 with multiphasic waveforms.  On the left, this is also greater than 1, though this is monophasic so likely falsely elevated.  Her stent is patent. She denies any new issues. She  is to continue her antiplatelet agent which is aspirin.  She is also on Eliquis.  She is to continue her statin for cholesterol control.  We discussed adequate blood pressure management.  We discussed continuing her walking protocol. She will return in 6 months along with ABIs and a left lower extremity arterial duplex. If all is stable at that time, we can go to yearly follow ups.         Follow Up     Return in about 6 months (around 1/29/2026) for ABIs, MARTHA.  Patient was given instructions and counseling regarding her condition or for health maintenance advice. Please see specific information pulled into the AVS if appropriate.     JUAN Tolliver

## 2025-07-30 LAB
BH CV LEA LEFT CFA DISTAL EDV: 17 CM/S
BH CV LEA LEFT CFA DISTAL PSV: 114 CM/S
BH CV LEA LEFT DFA PROX EDV: 9 CM/S
BH CV LEA LEFT DFA PROX PSV: 78 CM/S
BH CV LEA LEFT POPITEAL A  DISTAL EDV: -8.7 CM/S
BH CV LEA LEFT POPITEAL A  DISTAL PSV: -89.5 CM/S
BH CV LEA LEFT POPITEAL A  MID EDV: 9 CM/S
BH CV LEA LEFT POPITEAL A  MID PSV: 73 CM/S
BH CV LEA LEFT POPITEAL A  PROX EDV: 9.9 CM/S
BH CV LEA LEFT POPITEAL A  PROX PSV: 73.9 CM/S
BH CV LEA LEFT SFA DISTAL PSV: -65.2 CM/S
BH CV LEA LEFT SFA MID EDV: -11 CM/S
BH CV LEA LEFT SFA MID PSV: -90.2 CM/S
BH CV LEA LEFT SFA PROX EDV: 12.1 CM/S
BH CV LEA LEFT SFA PROX PSV: 130.7 CM/S
BH CV LEA LEFT TIBEOPERONEAL EDV: 9 CM/S
BH CV LEA LEFT TIBEOPERONEAL PSV: 89 CM/S
BH CV LOWER ARTERIAL LEFT ABI RATIO: 1.21
BH CV LOWER ARTERIAL LEFT DORSALIS PEDIS SYS MAX: 0
BH CV LOWER ARTERIAL LEFT POST TIBIAL SYS MAX: 160
BH CV LOWER ARTERIAL RIGHT ABI RATIO: 1.26
BH CV LOWER ARTERIAL RIGHT DORSALIS PEDIS SYS MAX: 166
BH CV LOWER ARTERIAL RIGHT POST TIBIAL SYS MAX: NORMAL
BH CV VAS LEA LEFT FREE TEXT STENT ONE: NORMAL
BH CV VAS LEA LEFT STENT ONE DIST STENT PSV: 65 CM/S
BH CV VAS LEA LEFT STENT ONE MID STENT EDV: 11 CM/S
BH CV VAS LEA LEFT STENT ONE MID STENT PSV: 90 CM/S
BH CV VAS LEA LEFT STENT ONE POST STENT EDV: 10 CM/S
BH CV VAS LEA LEFT STENT ONE POST STENT PSV: 74 CM/S
BH CV VAS LEA LEFT STENT ONE PRE STENT EDV: 11 CM/S
BH CV VAS LEA LEFT STENT ONE PRE STENT PSV: 127 CM/S
BH CV VAS LEA LEFT STENT ONE PROX STENT PSV: 131 CM/S
LEFT GROIN CFA SYS: 114.5 CM/SEC
UPPER ARTERIAL LEFT ARM BRACHIAL SYS MAX: NORMAL
UPPER ARTERIAL RIGHT ARM BRACHIAL SYS MAX: NORMAL

## 2025-08-25 RX ORDER — ATORVASTATIN CALCIUM 10 MG/1
10 TABLET, FILM COATED ORAL DAILY
Qty: 30 TABLET | Refills: 5 | Status: SHIPPED | OUTPATIENT
Start: 2025-08-25

## 2025-08-25 RX ORDER — FOLIC ACID 1 MG/1
1000 TABLET ORAL DAILY
Qty: 30 TABLET | Refills: 5 | Status: SHIPPED | OUTPATIENT
Start: 2025-08-25

## 2025-08-25 RX ORDER — FENOFIBRATE 160 MG/1
160 TABLET ORAL DAILY
Qty: 30 TABLET | Refills: 5 | Status: SHIPPED | OUTPATIENT
Start: 2025-08-25

## 2025-08-25 RX ORDER — MULTIVITAMIN WITH IRON
100 TABLET ORAL DAILY
Qty: 30 TABLET | Refills: 5 | Status: SHIPPED | OUTPATIENT
Start: 2025-08-25

## 2025-08-25 RX ORDER — FAMOTIDINE 40 MG/1
40 TABLET, FILM COATED ORAL DAILY
Qty: 30 TABLET | Refills: 5 | Status: SHIPPED | OUTPATIENT
Start: 2025-08-25

## (undated) DEVICE — SUT SILK 2/0 SH CR8 18IN CR8 C012D

## (undated) DEVICE — ANTIBACTERIAL UNDYED BRAIDED (POLYGLACTIN 910), SYNTHETIC ABSORBABLE SUTURE: Brand: COATED VICRYL

## (undated) DEVICE — GLV SURG BIOGEL LTX PF 8 1/2

## (undated) DEVICE — SUT PROLN 5/0 RB1 D/A 36IN 8556H

## (undated) DEVICE — NAVICROSS SUPPORT CATHETER: Brand: NAVICROSS

## (undated) DEVICE — PENCL SMOKE/EVAC MEGADYNE TELESCP 10FT

## (undated) DEVICE — PATIENT RETURN ELECTRODE, SINGLE-USE, CONTACT QUALITY MONITORING, ADULT, WITH 9FT CORD, FOR PATIENTS WEIGING OVER 33LBS. (15KG): Brand: MEGADYNE

## (undated) DEVICE — 3F 80CM OVER-THE-WIRE EMBOLECTOMY CATHETER: Brand: LEMAITRE EMBOLECTOMY CATHETER

## (undated) DEVICE — GAUZE,SPONGE,FLUFF,6"X6.75",STRL,10/TRAY: Brand: MEDLINE

## (undated) DEVICE — STPLR SKIN VISISTAT WD 35CT

## (undated) DEVICE — SPNG GZ WOVN 4X4IN 12PLY 10/BX STRL

## (undated) DEVICE — ST ACC MICROPUNCTURE STFF .018 ECHO/PLAT/TP 4F/10CM 21G/7CM

## (undated) DEVICE — PK AAA 40

## (undated) DEVICE — SUT PROLN 3/0 SH D/A 36IN 8522H

## (undated) DEVICE — SKIN PREP TRAY 4 COMPARTM TRAY: Brand: MEDLINE INDUSTRIES, INC.

## (undated) DEVICE — SYRINGE KIT,PACKAGED,,150FT,MK 7(ANGIO-ARTERION, 150ML SYR KIT W/QFT,MC)(60729385): Brand: MEDRAD® MARK 7 ARTERION DISPOSABLE SYRINGE 150 ML WITH QUICK FILL TUBE

## (undated) DEVICE — CATH GUIDE SOFTVU SELECT/V HT OMNI .038 5F 65CM

## (undated) DEVICE — PERCLOSE™ PROSTYLE™ SUTURE-MEDIATED CLOSURE AND REPAIR SYSTEM: Brand: PERCLOSE™ PROSTYLE™

## (undated) DEVICE — SYR LUERLOK 20CC BX/50

## (undated) DEVICE — SYR LL TP 10ML STRL

## (undated) DEVICE — DRAPE,FEM ANGIO,W/POUCHES, HD: Brand: MEDLINE

## (undated) DEVICE — PINNACLE R/O II INTRODUCER SHEATH WITH RADIOPAQUE MARKER: Brand: PINNACLE

## (undated) DEVICE — TRAP FLD MINIVAC MEGADYNE 100ML

## (undated) DEVICE — NDL HYPO PRECISIONGLIDE REG 25G 1 1/2

## (undated) DEVICE — SOL NACL 0.9PCT 1000ML

## (undated) DEVICE — RADIFOCUS TORQUE DEVICE MULTI-TORQUE VISE: Brand: RADIFOCUS TORQUE DEVICE

## (undated) DEVICE — GOWN,REINF,POLY,SIRUS,BRTH SLV,XLNG/XXL: Brand: MEDLINE

## (undated) DEVICE — PTA BALLOON DILATATION CATHETER: Brand: MUSTANG™

## (undated) DEVICE — APPL CHLORAPREP HI/LITE 26ML ORNG

## (undated) DEVICE — 3M™ IOBAN™ 2 ANTIMICROBIAL INCISE DRAPE 6648EZ: Brand: IOBAN™ 2

## (undated) DEVICE — SYR LUERLOK 30CC

## (undated) DEVICE — 3F 80 CM LEMAITRE EMBOLECTOMY CATHETER, EIFU: Brand: LEMAITRE EMBOLECTOMY CATHETER

## (undated) DEVICE — CATH GUIDE SOFTVU FLUSH HT STR .035 5F 65CM

## (undated) DEVICE — RADIFOCUS GLIDEWIRE ADVANTAGE GUIDEWIRE: Brand: GLIDEWIRE ADVANTAGE

## (undated) DEVICE — CVR PROB 96IN LF STRL

## (undated) DEVICE — SUT PROLN 6/0 BV1 D/A 30IN 8709H

## (undated) DEVICE — SUT PROLN 7/0 BV175/6 D/A 24IN 8735H

## (undated) DEVICE — UNDERGLV SURG BIOGEL INDICAT PI SZ8.5 BLU

## (undated) DEVICE — SOL NS 500ML

## (undated) DEVICE — SUT SILK 3/0 TIES 18IN A184H

## (undated) DEVICE — STPCK 3/WY HP M/RA W/OFF/HNDL 1050PSI STRL

## (undated) DEVICE — SUT VIC 4/0 SH 27IN J415H

## (undated) DEVICE — DESTINATION RENAL GUIDING SHEATH: Brand: DESTINATION

## (undated) DEVICE — RADIFOCUS GLIDEWIRE: Brand: GLIDEWIRE

## (undated) DEVICE — 4F 80 CM LEMAITRE EMBOLECTOMY CATHETER, EIFU: Brand: LEMAITRE EMBOLECTOMY CATHETER

## (undated) DEVICE — 2F 60 CM LEMAITRE EMBOLECTOMY CATHETER, EIFU: Brand: LEMAITRE EMBOLECTOMY CATHETER

## (undated) DEVICE — SHLD ANGIO 2LAYR CIR FEN

## (undated) DEVICE — INFLATION DEVICE: Brand: ENCORE™ 26

## (undated) DEVICE — DRSNG WND GZ PAD BORDERED 4X8IN STRL

## (undated) DEVICE — VESSEL LOOPS X-RAY DETECTABLE: Brand: DEROYAL

## (undated) DEVICE — CATHETER,URETHRAL,REDRUBBER,STERILE,22FR: Brand: MEDLINE

## (undated) DEVICE — SUT SILK 2/0 TIES 18IN A185H

## (undated) DEVICE — SUT SILK 4/0 TIES 18IN A183H

## (undated) DEVICE — BNDG,ELSTC,MATRIX,STRL,6"X5YD,LF,HOOK&LP: Brand: MEDLINE

## (undated) DEVICE — EQUIPMENT COVER BAG TYPE 48” X 36” (122CM X 91CM): Brand: EQUIPMENT COVER BAG TYPE

## (undated) DEVICE — PK ANGIO CERBRL RAD 40

## (undated) DEVICE — SYR LUERLOK 5CC

## (undated) DEVICE — PICO 7 10CM X 20CM: Brand: PICO™ 7

## (undated) DEVICE — CATH TEMPO 5F BER II 65CM: Brand: TEMPO

## (undated) DEVICE — EXTENSION SET, MALE LUER LOCK ADAPTER WITH RETRACTABLE COLLAR

## (undated) DEVICE — COVER,LIGHT HANDLE,FLX,2/PK: Brand: MEDLINE INDUSTRIES, INC.

## (undated) DEVICE — BANDAGE,GAUZE,BULKEE II,4.5"X4.1YD,STRL: Brand: MEDLINE